# Patient Record
Sex: MALE | Race: WHITE | NOT HISPANIC OR LATINO | Employment: UNEMPLOYED | URBAN - METROPOLITAN AREA
[De-identification: names, ages, dates, MRNs, and addresses within clinical notes are randomized per-mention and may not be internally consistent; named-entity substitution may affect disease eponyms.]

---

## 2018-11-01 ENCOUNTER — HOSPITAL ENCOUNTER (EMERGENCY)
Facility: HOSPITAL | Age: 38
Discharge: HOME | End: 2018-11-01
Attending: EMERGENCY MEDICINE | Admitting: EMERGENCY MEDICINE
Payer: COMMERCIAL

## 2018-11-01 VITALS
BODY MASS INDEX: 20.3 KG/M2 | OXYGEN SATURATION: 98 % | RESPIRATION RATE: 18 BRPM | HEIGHT: 71 IN | DIASTOLIC BLOOD PRESSURE: 83 MMHG | SYSTOLIC BLOOD PRESSURE: 128 MMHG | HEART RATE: 75 BPM | TEMPERATURE: 97.9 F | WEIGHT: 145 LBS

## 2018-11-01 DIAGNOSIS — R73.9 HYPERGLYCEMIA: Primary | ICD-10-CM

## 2018-11-01 LAB
ACETONE SERPL-MCNC: NEGATIVE MG/DL
ALBUMIN SERPL BCP-MCNC: 3.6 G/DL (ref 3.5–5)
ALP SERPL-CCNC: 138 U/L (ref 46–116)
ALT SERPL W P-5'-P-CCNC: 33 U/L (ref 12–78)
ANION GAP SERPL CALCULATED.3IONS-SCNC: 16 MMOL/L (ref 4–13)
ANION GAP SERPL CALCULATED.3IONS-SCNC: 19 MMOL/L (ref 4–13)
AST SERPL W P-5'-P-CCNC: 21 U/L (ref 5–45)
BASOPHILS # BLD AUTO: 0.06 THOUSANDS/ΜL (ref 0–0.1)
BASOPHILS NFR BLD AUTO: 1 % (ref 0–1)
BILIRUB SERPL-MCNC: 0.8 MG/DL (ref 0.2–1)
BUN SERPL-MCNC: 16 MG/DL (ref 5–25)
BUN SERPL-MCNC: 17 MG/DL (ref 5–25)
CALCIUM SERPL-MCNC: 7.9 MG/DL (ref 8.3–10.1)
CALCIUM SERPL-MCNC: 8.8 MG/DL (ref 8.3–10.1)
CHLORIDE SERPL-SCNC: 101 MMOL/L (ref 100–108)
CHLORIDE SERPL-SCNC: 96 MMOL/L (ref 100–108)
CLARITY, POC: CLEAR
CO2 SERPL-SCNC: 19 MMOL/L (ref 21–32)
CO2 SERPL-SCNC: 19 MMOL/L (ref 21–32)
COLOR, POC: YELLOW
CREAT SERPL-MCNC: 0.95 MG/DL (ref 0.6–1.3)
CREAT SERPL-MCNC: 1.23 MG/DL (ref 0.6–1.3)
EOSINOPHIL # BLD AUTO: 0.49 THOUSAND/ΜL (ref 0–0.61)
EOSINOPHIL NFR BLD AUTO: 5 % (ref 0–6)
ERYTHROCYTE [DISTWIDTH] IN BLOOD BY AUTOMATED COUNT: 12.5 % (ref 11.6–15.1)
EXT BILIRUBIN, UA: NORMAL
EXT BLOOD URINE: NEGATIVE
EXT GLUCOSE, UA: NORMAL
EXT KETONES: 160
EXT NITRITE, UA: NEGATIVE
EXT PH, UA: 6
EXT PROTEIN, UA: NORMAL
EXT SPECIFIC GRAVITY, UA: 1.03
EXT UROBILINOGEN: 0.2
GFR SERPL CREATININE-BSD FRML MDRD: 100 ML/MIN/1.73SQ M
GFR SERPL CREATININE-BSD FRML MDRD: 73 ML/MIN/1.73SQ M
GLUCOSE SERPL-MCNC: 107 MG/DL (ref 65–140)
GLUCOSE SERPL-MCNC: 331 MG/DL (ref 65–140)
GLUCOSE SERPL-MCNC: 334 MG/DL (ref 65–140)
GLUCOSE SERPL-MCNC: 88 MG/DL (ref 65–140)
GLUCOSE SERPL-MCNC: 98 MG/DL (ref 65–140)
HCT VFR BLD AUTO: 51.2 % (ref 36.5–49.3)
HGB BLD-MCNC: 17.1 G/DL (ref 12–17)
IMM GRANULOCYTES # BLD AUTO: 0.03 THOUSAND/UL (ref 0–0.2)
IMM GRANULOCYTES NFR BLD AUTO: 0 % (ref 0–2)
LYMPHOCYTES # BLD AUTO: 3.24 THOUSANDS/ΜL (ref 0.6–4.47)
LYMPHOCYTES NFR BLD AUTO: 33 % (ref 14–44)
MCH RBC QN AUTO: 29.5 PG (ref 26.8–34.3)
MCHC RBC AUTO-ENTMCNC: 33.4 G/DL (ref 31.4–37.4)
MCV RBC AUTO: 88 FL (ref 82–98)
MONOCYTES # BLD AUTO: 0.68 THOUSAND/ΜL (ref 0.17–1.22)
MONOCYTES NFR BLD AUTO: 7 % (ref 4–12)
NEUTROPHILS # BLD AUTO: 5.41 THOUSANDS/ΜL (ref 1.85–7.62)
NEUTS SEG NFR BLD AUTO: 54 % (ref 43–75)
NRBC BLD AUTO-RTO: 0 /100 WBCS
PLATELET # BLD AUTO: 333 THOUSANDS/UL (ref 149–390)
PMV BLD AUTO: 12.3 FL (ref 8.9–12.7)
POTASSIUM SERPL-SCNC: 3.5 MMOL/L (ref 3.5–5.3)
POTASSIUM SERPL-SCNC: 4 MMOL/L (ref 3.5–5.3)
PROT SERPL-MCNC: 7.6 G/DL (ref 6.4–8.2)
RBC # BLD AUTO: 5.8 MILLION/UL (ref 3.88–5.62)
SODIUM SERPL-SCNC: 134 MMOL/L (ref 136–145)
SODIUM SERPL-SCNC: 136 MMOL/L (ref 136–145)
WBC # BLD AUTO: 9.91 THOUSAND/UL (ref 4.31–10.16)
WBC # BLD EST: NEGATIVE 10*3/UL

## 2018-11-01 PROCEDURE — 85025 COMPLETE CBC W/AUTO DIFF WBC: CPT | Performed by: PHYSICIAN ASSISTANT

## 2018-11-01 PROCEDURE — 96361 HYDRATE IV INFUSION ADD-ON: CPT

## 2018-11-01 PROCEDURE — 82009 KETONE BODYS QUAL: CPT | Performed by: PHYSICIAN ASSISTANT

## 2018-11-01 PROCEDURE — 36415 COLL VENOUS BLD VENIPUNCTURE: CPT | Performed by: PHYSICIAN ASSISTANT

## 2018-11-01 PROCEDURE — 80053 COMPREHEN METABOLIC PANEL: CPT | Performed by: PHYSICIAN ASSISTANT

## 2018-11-01 PROCEDURE — 81002 URINALYSIS NONAUTO W/O SCOPE: CPT | Performed by: PHYSICIAN ASSISTANT

## 2018-11-01 PROCEDURE — 82948 REAGENT STRIP/BLOOD GLUCOSE: CPT

## 2018-11-01 PROCEDURE — 96374 THER/PROPH/DIAG INJ IV PUSH: CPT

## 2018-11-01 PROCEDURE — 99284 EMERGENCY DEPT VISIT MOD MDM: CPT

## 2018-11-01 PROCEDURE — 80048 BASIC METABOLIC PNL TOTAL CA: CPT | Performed by: PHYSICIAN ASSISTANT

## 2018-11-01 RX ORDER — LEVOTHYROXINE SODIUM 0.07 MG/1
75 TABLET ORAL DAILY
COMMUNITY

## 2018-11-01 RX ADMIN — INSULIN HUMAN 7 UNITS: 100 INJECTION, SOLUTION PARENTERAL at 11:48

## 2018-11-01 RX ADMIN — SODIUM CHLORIDE 1000 ML: 0.9 INJECTION, SOLUTION INTRAVENOUS at 11:40

## 2018-11-01 RX ADMIN — SODIUM CHLORIDE 1000 ML: 0.9 INJECTION, SOLUTION INTRAVENOUS at 13:04

## 2018-11-01 NOTE — ED PROVIDER NOTES
History  Chief Complaint   Patient presents with    Hyperglycemia - Symptomatic     Type 2 DM  He has not had any insulin for 2wks  He ran out of it and has not been to Dr for refill  Decreased appetite, nausea, dry mouth  Pt is a 43year old Diabetic male with a history of IV meth abuse presenting today because he ran out of his insulin 2 weeks ago  He states that he had a recent admission to [facility] and was discharged on October 12th  He states he hasn't seen his PCP for a couple years  He was receiving insulin while inpatient, but was not discharged on anything  He states he relapsed and started using meth again when discharged  He states last use was 1 week ago  He does not monitor his blood sugar at home, but states he has had nausea, fatigue, anorexia  History provided by:  Patient   used: No    Hyperglycemia - Symptomatic   Severity:  Moderate  Onset quality:  Gradual  Duration:  2 weeks  Timing:  Constant  Progression:  Worsening  Chronicity:  Recurrent  Diabetes status:  Controlled with insulin and controlled with oral medications  Current diabetic therapy:  Metformin, NPH, lispro  Time since last antidiabetic medication:  2 weeks  Context: noncompliance    Relieved by:  Nothing  Ineffective treatments:  None tried  Associated symptoms: abdominal pain, blurred vision, dehydration, dizziness, nausea and vomiting    Associated symptoms: no chest pain, no dysuria, no fatigue, no fever and no shortness of breath    Abdominal pain:     Location:  Generalized    Severity:  Mild    Onset quality:  Gradual    Duration:  2 weeks    Timing:  Intermittent    Progression:  Waxing and waning    Chronicity:  Recurrent      Prior to Admission Medications   Prescriptions Last Dose Informant Patient Reported?  Taking?   insulin NPH (HumuLIN N,NovoLIN N) 100 Units/mL subcutaneous injection Past Month at Unknown time  Yes Yes   Sig: Inject 24 Units under the skin 24 UNITS am  18 UNITS hs   insulin lispro (HumaLOG) 100 units/mL injection Past Month at Unknown time  Yes Yes   Sig: Inject 4 Units under the skin 3 (three) times a day before meals   levothyroxine 75 mcg tablet   Yes Yes   Sig: Take 75 mcg by mouth daily   metFORMIN (GLUCOPHAGE) 1000 MG tablet   Yes Yes   Sig: Take 1,000 mg by mouth 2 (two) times a day with meals      Facility-Administered Medications: None       Past Medical History:   Diagnosis Date    Diabetes mellitus ([facility] [address] )     Disease of thyroid gland        History reviewed  No pertinent surgical history  History reviewed  No pertinent family history  I have reviewed and agree with the history as documented  Social History   Substance Use Topics    Smoking status: Current Every Day Smoker     Packs/day: 0 50     Types: Cigarettes    Smokeless tobacco: Never Used    Alcohol use Yes      Comment: ocassionally        Review of Systems   Constitutional: Negative for chills, fatigue and fever  HENT: Negative for congestion, rhinorrhea, sneezing and sore throat  Eyes: Positive for blurred vision  Respiratory: Negative for cough, chest tightness and shortness of breath  Cardiovascular: Negative for chest pain  Gastrointestinal: Positive for abdominal pain, nausea and vomiting  Negative for constipation and diarrhea  Genitourinary: Negative for difficulty urinating and dysuria  Musculoskeletal: Negative for arthralgias and myalgias  Skin: Negative for color change, pallor and rash  Neurological: Positive for dizziness and light-headedness  Negative for headaches  All other systems reviewed and are negative  Physical Exam  Physical Exam   Constitutional: He is oriented to person, place, and time  He appears well-developed and well-nourished  No distress  HENT:   Head: Normocephalic and atraumatic     Right Ear: Tympanic membrane and ear canal normal    Left Ear: Tympanic membrane and ear canal normal    Nose: Nose normal    Mouth/Throat: Uvula is midline  Mucous membranes are dry  Eyes: Conjunctivae and lids are normal    Neck: Neck supple  Cardiovascular: Regular rhythm and intact distal pulses  Tachycardia present  No murmur heard  Pulmonary/Chest: Effort normal and breath sounds normal  No respiratory distress  Abdominal: Soft  Bowel sounds are normal  He exhibits no distension  There is no tenderness  Lymphadenopathy:     He has no cervical adenopathy  Neurological: He is alert and oriented to person, place, and time  Skin: Skin is warm and dry  No rash noted  He is not diaphoretic  No pallor  Patient has a small indurated area to right volar forearm, tender to palpation, measuring 2cm  No fluctuant area  Nursing note and vitals reviewed        Vital Signs  ED Triage Vitals [11/01/18 1126]   Temperature Pulse Respirations Blood Pressure SpO2   97 9 °F (36 6 °C) (!) 109 18 (!) 164/101 99 %      Temp Source Heart Rate Source Patient Position - Orthostatic VS BP Location FiO2 (%)   Temporal -- Lying Left arm --      Pain Score       --           Vitals:    11/01/18 1230 11/01/18 1330 11/01/18 1400 11/01/18 1500   BP: 141/93 151/91 143/88 128/83   Pulse: 89 75 72 75   Patient Position - Orthostatic VS:           Visual Acuity      ED Medications  Medications   sodium chloride 0 9 % bolus 1,000 mL (0 mL Intravenous Stopped 11/1/18 1304)   insulin regular (HumuLIN R,NovoLIN R) injection 7 Units (7 Units Intravenous Given 11/1/18 1148)   sodium chloride 0 9 % bolus 1,000 mL (0 mL Intravenous Stopped 11/1/18 1512)       Diagnostic Studies  Results Reviewed     Procedure Component Value Units Date/Time    Fingerstick Glucose (POCT) [[id number]]  (Normal) Collected:  11/01/18 1618    Lab Status:  Final result Updated:  11/01/18 1620     POC Glucose 107 mg/dl     POCT urinalysis dipstick [[id number]]  (Normal) Resulted:  11/01/18 1518    Lab Status:  Final result Specimen:  Urine Updated:  11/01/18 1519     Color, UA yellow     Clarity, UA clear Glucose, UA (Ref: Negative) 2 or more     Bilirubin, UA (Ref: Negative) ++     Ketones, UA (Ref: Negative) 160     Spec Grav, UA (Ref:1 003-1 030) 1 030     Blood, UA (Ref: Negative) negative     pH, UA (Ref: 4 5-8 0) 6 0     Protein, UA (Ref: Negative) trace     Urobilinogen, UA (Ref: 0 2- 1 0) 0 2      Leukocytes, UA (Ref: Negative) negative     Nitrite, UA (Ref: Negative) negative    Basic metabolic panel [[id number]]  (Abnormal) Collected:  11/01/18 1509    Lab Status:  Final result Specimen:  Blood from Arm, Right Updated:  11/01/18 1518     Sodium 136 mmol/L      Potassium 3 5 mmol/L      Chloride 101 mmol/L      CO2 19 (L) mmol/L      ANION GAP 16 (H) mmol/L      BUN 16 mg/dL      Creatinine 0 95 mg/dL      Glucose 98 mg/dL      Calcium 7 9 (L) mg/dL      eGFR 100 ml/min/1 73sq m     Narrative:         National Kidney Disease Education Program recommendations are as follows:  GFR calculation is accurate only with a steady state creatinine  Chronic Kidney disease less than 60 ml/min/1 73 sq  meters  Kidney failure less than 15 ml/min/1 73 sq  meters      Fingerstick Glucose (POCT) [[id number]]  (Normal) Collected:  11/01/18 1502    Lab Status:  Final result Updated:  11/01/18 1503     POC Glucose 88 mg/dl     Comprehensive metabolic panel [[id number]]  (Abnormal) Collected:  11/01/18 1151    Lab Status:  Final result Specimen:  Blood from Arm, Right Updated:  11/01/18 1226     Sodium 134 (L) mmol/L      Potassium 4 0 mmol/L      Chloride 96 (L) mmol/L      CO2 19 (L) mmol/L      ANION GAP 19 (H) mmol/L      BUN 17 mg/dL      Creatinine 1 23 mg/dL      Glucose 331 (H) mg/dL      Calcium 8 8 mg/dL      AST 21 U/L      ALT 33 U/L      Alkaline Phosphatase 138 (H) U/L      Total Protein 7 6 g/dL      Albumin 3 6 g/dL      Total Bilirubin 0 80 mg/dL      eGFR 73 ml/min/1 73sq m     Narrative:         National Kidney Disease Education Program recommendations are as follows:  GFR calculation is accurate only with a steady state creatinine  Chronic Kidney disease less than 60 ml/min/1 73 sq  meters  Kidney failure less than 15 ml/min/1 73 sq  meters  Acetone [[id number]]  (Normal) Collected:  11/01/18 1151    Lab Status:  Final result Specimen:  Blood from Arm, Right Updated:  11/01/18 1220     Acetone, Bld Negative    CBC and differential [[id number]]  (Abnormal) Collected:  11/01/18 1151    Lab Status:  Final result Specimen:  Blood from Arm, Right Updated:  11/01/18 1211     WBC 9 91 Thousand/uL      RBC 5 80 (H) Million/uL      Hemoglobin 17 1 (H) g/dL      Hematocrit 51 2 (H) %      MCV 88 fL      MCH 29 5 pg      MCHC 33 4 g/dL      RDW 12 5 %      MPV 12 3 fL      Platelets 556 Thousands/uL      nRBC 0 /100 WBCs      Neutrophils Relative 54 %      Immat GRANS % 0 %      Lymphocytes Relative 33 %      Monocytes Relative 7 %      Eosinophils Relative 5 %      Basophils Relative 1 %      Neutrophils Absolute 5 41 Thousands/µL      Immature Grans Absolute 0 03 Thousand/uL      Lymphocytes Absolute 3 24 Thousands/µL      Monocytes Absolute 0 68 Thousand/µL      Eosinophils Absolute 0 49 Thousand/µL      Basophils Absolute 0 06 Thousands/µL     Fingerstick Glucose (POCT) [[id number]]  (Abnormal) Collected:  11/01/18 1126    Lab Status:  Final result Updated:  11/01/18 1127     POC Glucose 334 (H) mg/dl                  No orders to display              Procedures  Procedures       Phone Contacts  ED Phone Contact    ED Course                               MDM  Number of Diagnoses or Management Options  Hyperglycemia: established and worsening  Diagnosis management comments: Patient with hyperglycemia due to not taking meds for 2 weeks  He does have anion gap, but I feel like this is due to drug use since acetone is negative  Patient does not appear ill and is tolerating po  Patient's BS dropped to 88 after only 7 units of insulin, will give patient food and advised him to monitor BS closely  He is requesting refill of insulin    I advised him to f/u with PCP for refill, will refill Metformin  Amount and/or Complexity of Data Reviewed  Clinical lab tests: ordered and reviewed    Patient Progress  Patient progress: improved    CritCare Time    Disposition  Final diagnoses:   Hyperglycemia     Time reflects when diagnosis was documented in both MDM as applicable and the Disposition within this note     Time User Action Codes Description Comment    11/1/2018  4:27 PM Rigo Romano Add [R73 9] Hyperglycemia       ED Disposition     ED Disposition Condition Comment    Discharge  Unknown Quivers discharge to home/self care  Condition at discharge: Stable        Follow-up Information     Follow up With Specialties Details Why [address]  [address], [place] [facility] In 3 days For recheck [address] [place] [address]  [phone]            Patient's Medications   Discharge Prescriptions    METFORMIN (GLUCOPHAGE) 500 MG TABLET    Take 1 tablet (500 mg total) by mouth 2 (two) times a day with meals       Start Date: 11/1/2018 End Date: --       Order Dose: 500 mg       Quantity: 14 tablet    Refills: 0     No discharge procedures on file      ED Provider  Electronically Signed by           Ernesto Oconnor PA-C  11/01/18 6332

## 2018-11-01 NOTE — DISCHARGE INSTRUCTIONS
Rest, increase fluids  Take metformin twice a day  Follow up with family doctor in 3-5 days for recheck  Monitor blood sugar closely  Warm compresses to right arm  Diabetic Hyperglycemia   WHAT YOU NEED TO KNOW:   Diabetic hyperglycemia is a blood glucose (sugar) level that is higher than your healthcare provider recommends  You may have increased thirst and urinate more often than usual  Over time, uncontrolled diabetes can damage your nerves, blood vessels, tissues, and organs  That is why it is important to manage diabetic hyperglycemia  Without treatment, diabetic hyperglycemia can lead to diabetic ketoacidosis (DKA) or hyperglycemic hyperosmolar state (HHS)  These are serious conditions that can become life-threatening  DISCHARGE INSTRUCTIONS:   Return to the emergency department if:   · You have shortness of breath  · Your breath smells fruity  · You have nausea and vomiting  · You have symptoms of dehydration, such as dark yellow urine, dry mouth and lips, and dry skin  Contact your healthcare provider if:   · You continue to have higher blood sugar levels than your healthcare provider recommends  · Your blood sugar level is over 240 mg/dl and  you have ketones in your urine  · You have questions or concerns about your condition or care  Medicines:   · Medicines  such as insulin and hypoglycemic medicine decrease blood sugar levels  · Take your medicine as directed  Contact your healthcare provider if you think your medicine is not helping or if you have side effects  Tell him or her if you are allergic to any medicine  Keep a list of the medicines, vitamins, and herbs you take  Include the amounts, and when and why you take them  Bring the list or the pill bottles to follow-up visits  Carry your medicine list with you in case of an emergency  Follow up with your healthcare provider or specialist as directed:   Your healthcare provider may refer you to a dietitian or diabetes specialist  Write down your questions so you remember to ask them during your visits  Manage diabetic hyperglycemia:   · If you take diabetes medicine or insulin, take it as directed  Missed or wrong doses can cause your blood sugar to go up  · Tell your healthcare provider if you continue to have trouble managing your blood sugar  He may change the type, amount, or timing of your diabetes medicine or insulin  If you do not take diabetes medicine or insulin, you may need to start  · Work with your healthcare provider to develop a sick day plan  Illness can cause your blood sugar to rise  A sick day plan helps you control your blood sugar level when you are sick  Prevent diabetic hyperglycemia:   · Check your blood sugar levels regularly  Ask your healthcare provider how often to check your blood sugar and what your levels should be  · Follow your meal plan  Your blood sugar can go up if you eat a large meal or you eat more carbohydrates than recommended  Work with a dietitian to develop a meal plan that is right for you  · Exercise regularly  to help lower your blood sugar when it is high  It can also keep your blood sugar levels steady over time  Exercise for at least 30 minutes, 5 days a week  Include muscle strengthening activities 2 days each week  Do not sit for longer than 90 minutes at a time  Work with your healthcare provider to create an exercise plan  Children should get at least 60 minutes of physical activity each day  · Check your ketones before exercise  if your blood sugar level is above 240 mg/dl  Do not exercise if you have ketones in your urine,  because your blood sugar level may rise even more  Ask your healthcare provider how to lower your blood sugar when you have ketones  © 2017 [address] [employer] Information is for End User's use only and may not be sold, redistributed or otherwise used for commercial purposes   All illustrations and images included in [facility] are the copyrighted property of [employer]  or [employer]  The above information is an  only  It is not intended as medical advice for individual conditions or treatments  Talk to your doctor, nurse or pharmacist before following any medical regimen to see if it is safe and effective for you

## 2020-04-19 ENCOUNTER — APPOINTMENT (EMERGENCY)
Dept: RADIOLOGY | Facility: HOSPITAL | Age: 40
DRG: 420 | End: 2020-04-19
Payer: COMMERCIAL

## 2020-04-19 ENCOUNTER — APPOINTMENT (EMERGENCY)
Dept: CT IMAGING | Facility: HOSPITAL | Age: 40
DRG: 420 | End: 2020-04-19
Payer: COMMERCIAL

## 2020-04-19 ENCOUNTER — HOSPITAL ENCOUNTER (INPATIENT)
Facility: HOSPITAL | Age: 40
LOS: 1 days | Discharge: LEFT BEFORE DISCHARGE | DRG: 420 | End: 2020-04-20
Attending: EMERGENCY MEDICINE | Admitting: HOSPITALIST
Payer: COMMERCIAL

## 2020-04-19 DIAGNOSIS — S69.92XA HAND INJURY, LEFT, INITIAL ENCOUNTER: ICD-10-CM

## 2020-04-19 DIAGNOSIS — S61.412A LACERATION OF LEFT HAND WITHOUT FOREIGN BODY, INITIAL ENCOUNTER: Primary | ICD-10-CM

## 2020-04-19 DIAGNOSIS — N17.9 AKI (ACUTE KIDNEY INJURY) (HCC): ICD-10-CM

## 2020-04-19 DIAGNOSIS — S69.91XA HAND INJURY, RIGHT, INITIAL ENCOUNTER: ICD-10-CM

## 2020-04-19 DIAGNOSIS — E11.01 HHNC (HYPERGLYCEMIC HYPEROSMOLAR NONKETOTIC COMA) (HCC): ICD-10-CM

## 2020-04-19 DIAGNOSIS — R74.01 TRANSAMINITIS: ICD-10-CM

## 2020-04-19 DIAGNOSIS — K59.00 CONSTIPATION: ICD-10-CM

## 2020-04-19 PROBLEM — E87.1 HYPONATREMIA: Status: ACTIVE | Noted: 2020-04-19

## 2020-04-19 PROBLEM — F17.200 NICOTINE DEPENDENCE: Status: ACTIVE | Noted: 2020-04-19

## 2020-04-19 PROBLEM — E11.9 DIABETES (HCC): Status: ACTIVE | Noted: 2020-04-19

## 2020-04-19 PROBLEM — S61.419A HAND LACERATION: Status: ACTIVE | Noted: 2020-04-19

## 2020-04-19 PROBLEM — E03.9 HYPOTHYROIDISM: Status: ACTIVE | Noted: 2020-04-19

## 2020-04-19 PROBLEM — F11.90 HEROIN USE: Status: ACTIVE | Noted: 2020-04-19

## 2020-04-19 LAB
ALBUMIN SERPL BCP-MCNC: 3.2 G/DL (ref 3.5–5)
ALP SERPL-CCNC: 272 U/L (ref 46–116)
ALT SERPL W P-5'-P-CCNC: 1083 U/L (ref 12–78)
AMPHETAMINES SERPL QL SCN: NEGATIVE
ANION GAP SERPL CALCULATED.3IONS-SCNC: 7 MMOL/L (ref 4–13)
ANION GAP SERPL CALCULATED.3IONS-SCNC: 9 MMOL/L (ref 4–13)
AST SERPL W P-5'-P-CCNC: 639 U/L (ref 5–45)
BACTERIA UR QL AUTO: ABNORMAL /HPF
BARBITURATES UR QL: NEGATIVE
BASE EX.OXY STD BLDV CALC-SCNC: 79.5 % (ref 60–80)
BASE EXCESS BLDV CALC-SCNC: 0.3 MMOL/L
BASOPHILS # BLD AUTO: 0.05 THOUSANDS/ΜL (ref 0–0.1)
BASOPHILS NFR BLD AUTO: 1 % (ref 0–1)
BENZODIAZ UR QL: NEGATIVE
BETA-HYDROXYBUTYRATE: 0.6 MMOL/L
BILIRUB SERPL-MCNC: 0.44 MG/DL (ref 0.2–1)
BILIRUB UR QL STRIP: NEGATIVE
BUN SERPL-MCNC: 14 MG/DL (ref 5–25)
BUN SERPL-MCNC: 23 MG/DL (ref 5–25)
CALCIUM SERPL-MCNC: 7.7 MG/DL (ref 8.3–10.1)
CALCIUM SERPL-MCNC: 8.9 MG/DL (ref 8.3–10.1)
CHLORIDE SERPL-SCNC: 92 MMOL/L (ref 100–108)
CHLORIDE SERPL-SCNC: 97 MMOL/L (ref 100–108)
CK MB SERPL-MCNC: 1.1 % (ref 0–2.5)
CK MB SERPL-MCNC: 1.8 NG/ML (ref 0–5)
CK SERPL-CCNC: 168 U/L (ref 39–308)
CLARITY UR: CLEAR
CO2 SERPL-SCNC: 26 MMOL/L (ref 21–32)
CO2 SERPL-SCNC: 27 MMOL/L (ref 21–32)
COCAINE UR QL: NEGATIVE
COLOR UR: YELLOW
CREAT SERPL-MCNC: 1.17 MG/DL (ref 0.6–1.3)
CREAT SERPL-MCNC: 1.32 MG/DL (ref 0.6–1.3)
EOSINOPHIL # BLD AUTO: 0.11 THOUSAND/ΜL (ref 0–0.61)
EOSINOPHIL NFR BLD AUTO: 1 % (ref 0–6)
ERYTHROCYTE [DISTWIDTH] IN BLOOD BY AUTOMATED COUNT: 12.1 % (ref 11.6–15.1)
GFR SERPL CREATININE-BSD FRML MDRD: 67 ML/MIN/1.73SQ M
GFR SERPL CREATININE-BSD FRML MDRD: 78 ML/MIN/1.73SQ M
GLUCOSE SERPL-MCNC: 140 MG/DL (ref 65–140)
GLUCOSE SERPL-MCNC: 180 MG/DL (ref 65–140)
GLUCOSE SERPL-MCNC: 185 MG/DL (ref 65–140)
GLUCOSE SERPL-MCNC: 232 MG/DL (ref 65–140)
GLUCOSE SERPL-MCNC: 384 MG/DL (ref 65–140)
GLUCOSE SERPL-MCNC: 446 MG/DL (ref 65–140)
GLUCOSE SERPL-MCNC: 449 MG/DL (ref 65–140)
GLUCOSE SERPL-MCNC: 572 MG/DL (ref 65–140)
GLUCOSE SERPL-MCNC: 791 MG/DL (ref 65–140)
GLUCOSE SERPL-MCNC: 92 MG/DL (ref 65–140)
GLUCOSE SERPL-MCNC: >500 MG/DL (ref 65–140)
GLUCOSE UR STRIP-MCNC: ABNORMAL MG/DL
HAV IGM SER QL: NORMAL
HBV CORE IGM SER QL: NORMAL
HBV SURFACE AG SER QL: NORMAL
HCO3 BLDV-SCNC: 24.5 MMOL/L (ref 24–30)
HCT VFR BLD AUTO: 41.6 % (ref 36.5–49.3)
HCV AB SER QL: NORMAL
HGB BLD-MCNC: 13.5 G/DL (ref 12–17)
HGB UR QL STRIP.AUTO: ABNORMAL
IMM GRANULOCYTES # BLD AUTO: 0.05 THOUSAND/UL (ref 0–0.2)
IMM GRANULOCYTES NFR BLD AUTO: 1 % (ref 0–2)
KETONES UR STRIP-MCNC: NEGATIVE MG/DL
LEUKOCYTE ESTERASE UR QL STRIP: NEGATIVE
LYMPHOCYTES # BLD AUTO: 1.88 THOUSANDS/ΜL (ref 0.6–4.47)
LYMPHOCYTES NFR BLD AUTO: 19 % (ref 14–44)
MCH RBC QN AUTO: 30.3 PG (ref 26.8–34.3)
MCHC RBC AUTO-ENTMCNC: 32.5 G/DL (ref 31.4–37.4)
MCV RBC AUTO: 94 FL (ref 82–98)
METHADONE UR QL: NEGATIVE
MONOCYTES # BLD AUTO: 0.67 THOUSAND/ΜL (ref 0.17–1.22)
MONOCYTES NFR BLD AUTO: 7 % (ref 4–12)
NEUTROPHILS # BLD AUTO: 6.94 THOUSANDS/ΜL (ref 1.85–7.62)
NEUTS SEG NFR BLD AUTO: 71 % (ref 43–75)
NITRITE UR QL STRIP: NEGATIVE
NON-SQ EPI CELLS URNS QL MICRO: ABNORMAL /HPF
NRBC BLD AUTO-RTO: 0 /100 WBCS
O2 CT BLDV-SCNC: 16 ML/DL
OPIATES UR QL SCN: NEGATIVE
PCO2 BLDV: 38.3 MM HG (ref 42–50)
PCP UR QL: NEGATIVE
PH BLDV: 7.42 [PH] (ref 7.3–7.4)
PH UR STRIP.AUTO: 6 [PH] (ref 4.5–8)
PLATELET # BLD AUTO: 321 THOUSANDS/UL (ref 149–390)
PMV BLD AUTO: 11 FL (ref 8.9–12.7)
PO2 BLDV: 48.9 MM HG (ref 35–45)
POTASSIUM SERPL-SCNC: 3.6 MMOL/L (ref 3.5–5.3)
POTASSIUM SERPL-SCNC: 4.2 MMOL/L (ref 3.5–5.3)
PROT SERPL-MCNC: 7.2 G/DL (ref 6.4–8.2)
PROT UR STRIP-MCNC: NEGATIVE MG/DL
RBC # BLD AUTO: 4.45 MILLION/UL (ref 3.88–5.62)
RBC #/AREA URNS AUTO: ABNORMAL /HPF
SODIUM SERPL-SCNC: 127 MMOL/L (ref 136–145)
SODIUM SERPL-SCNC: 131 MMOL/L (ref 136–145)
SP GR UR STRIP.AUTO: 1.01 (ref 1–1.03)
THC UR QL: NEGATIVE
TSH SERPL DL<=0.05 MIU/L-ACNC: 17.91 UIU/ML (ref 0.36–3.74)
UROBILINOGEN UR QL STRIP.AUTO: 0.2 E.U./DL
WBC # BLD AUTO: 9.7 THOUSAND/UL (ref 4.31–10.16)
WBC #/AREA URNS AUTO: ABNORMAL /HPF

## 2020-04-19 PROCEDURE — 80053 COMPREHEN METABOLIC PANEL: CPT | Performed by: NURSE PRACTITIONER

## 2020-04-19 PROCEDURE — 82948 REAGENT STRIP/BLOOD GLUCOSE: CPT

## 2020-04-19 PROCEDURE — 80074 ACUTE HEPATITIS PANEL: CPT | Performed by: NURSE PRACTITIONER

## 2020-04-19 PROCEDURE — 99285 EMERGENCY DEPT VISIT HI MDM: CPT

## 2020-04-19 PROCEDURE — 80048 BASIC METABOLIC PNL TOTAL CA: CPT | Performed by: INTERNAL MEDICINE

## 2020-04-19 PROCEDURE — 83540 ASSAY OF IRON: CPT | Performed by: INTERNAL MEDICINE

## 2020-04-19 PROCEDURE — 84443 ASSAY THYROID STIM HORMONE: CPT | Performed by: INTERNAL MEDICINE

## 2020-04-19 PROCEDURE — 96376 TX/PRO/DX INJ SAME DRUG ADON: CPT

## 2020-04-19 PROCEDURE — 96361 HYDRATE IV INFUSION ADD-ON: CPT

## 2020-04-19 PROCEDURE — 83550 IRON BINDING TEST: CPT | Performed by: INTERNAL MEDICINE

## 2020-04-19 PROCEDURE — 82010 KETONE BODYS QUAN: CPT | Performed by: NURSE PRACTITIONER

## 2020-04-19 PROCEDURE — 74177 CT ABD & PELVIS W/CONTRAST: CPT

## 2020-04-19 PROCEDURE — 0HQGXZZ REPAIR LEFT HAND SKIN, EXTERNAL APPROACH: ICD-10-PCS | Performed by: EMERGENCY MEDICINE

## 2020-04-19 PROCEDURE — 96372 THER/PROPH/DIAG INJ SC/IM: CPT

## 2020-04-19 PROCEDURE — 82550 ASSAY OF CK (CPK): CPT | Performed by: INTERNAL MEDICINE

## 2020-04-19 PROCEDURE — 99223 1ST HOSP IP/OBS HIGH 75: CPT | Performed by: INTERNAL MEDICINE

## 2020-04-19 PROCEDURE — 81001 URINALYSIS AUTO W/SCOPE: CPT

## 2020-04-19 PROCEDURE — 94762 N-INVAS EAR/PLS OXIMTRY CONT: CPT

## 2020-04-19 PROCEDURE — 96374 THER/PROPH/DIAG INJ IV PUSH: CPT

## 2020-04-19 PROCEDURE — 82553 CREATINE MB FRACTION: CPT | Performed by: INTERNAL MEDICINE

## 2020-04-19 PROCEDURE — 36415 COLL VENOUS BLD VENIPUNCTURE: CPT | Performed by: NURSE PRACTITIONER

## 2020-04-19 PROCEDURE — 80307 DRUG TEST PRSMV CHEM ANLYZR: CPT | Performed by: NURSE PRACTITIONER

## 2020-04-19 PROCEDURE — 82805 BLOOD GASES W/O2 SATURATION: CPT | Performed by: NURSE PRACTITIONER

## 2020-04-19 PROCEDURE — 12001 RPR S/N/AX/GEN/TRNK 2.5CM/<: CPT | Performed by: NURSE PRACTITIONER

## 2020-04-19 PROCEDURE — 99285 EMERGENCY DEPT VISIT HI MDM: CPT | Performed by: NURSE PRACTITIONER

## 2020-04-19 PROCEDURE — 96375 TX/PRO/DX INJ NEW DRUG ADDON: CPT

## 2020-04-19 PROCEDURE — 85025 COMPLETE CBC W/AUTO DIFF WBC: CPT | Performed by: NURSE PRACTITIONER

## 2020-04-19 PROCEDURE — 82728 ASSAY OF FERRITIN: CPT | Performed by: INTERNAL MEDICINE

## 2020-04-19 PROCEDURE — 73130 X-RAY EXAM OF HAND: CPT

## 2020-04-19 RX ORDER — KETOROLAC TROMETHAMINE 30 MG/ML
15 INJECTION, SOLUTION INTRAMUSCULAR; INTRAVENOUS ONCE
Status: COMPLETED | OUTPATIENT
Start: 2020-04-19 | End: 2020-04-19

## 2020-04-19 RX ORDER — LEVOTHYROXINE SODIUM 0.07 MG/1
75 TABLET ORAL DAILY
Status: DISCONTINUED | OUTPATIENT
Start: 2020-04-19 | End: 2020-04-20 | Stop reason: HOSPADM

## 2020-04-19 RX ORDER — MAGNESIUM HYDROXIDE/ALUMINUM HYDROXICE/SIMETHICONE 120; 1200; 1200 MG/30ML; MG/30ML; MG/30ML
30 SUSPENSION ORAL EVERY 6 HOURS PRN
Status: DISCONTINUED | OUTPATIENT
Start: 2020-04-19 | End: 2020-04-20 | Stop reason: HOSPADM

## 2020-04-19 RX ORDER — CALCIUM CARBONATE 200(500)MG
1000 TABLET,CHEWABLE ORAL DAILY PRN
Status: DISCONTINUED | OUTPATIENT
Start: 2020-04-19 | End: 2020-04-20 | Stop reason: HOSPADM

## 2020-04-19 RX ORDER — ONDANSETRON 2 MG/ML
4 INJECTION INTRAMUSCULAR; INTRAVENOUS ONCE
Status: COMPLETED | OUTPATIENT
Start: 2020-04-19 | End: 2020-04-19

## 2020-04-19 RX ORDER — CLONIDINE HYDROCHLORIDE 0.1 MG/1
0.1 TABLET ORAL ONCE
Status: COMPLETED | OUTPATIENT
Start: 2020-04-19 | End: 2020-04-19

## 2020-04-19 RX ORDER — INSULIN GLARGINE 100 [IU]/ML
18 INJECTION, SOLUTION SUBCUTANEOUS
Status: DISCONTINUED | OUTPATIENT
Start: 2020-04-19 | End: 2020-04-19

## 2020-04-19 RX ORDER — NICOTINE 21 MG/24HR
1 PATCH, TRANSDERMAL 24 HOURS TRANSDERMAL DAILY
Status: DISCONTINUED | OUTPATIENT
Start: 2020-04-19 | End: 2020-04-20 | Stop reason: HOSPADM

## 2020-04-19 RX ORDER — ONDANSETRON 2 MG/ML
4 INJECTION INTRAMUSCULAR; INTRAVENOUS EVERY 6 HOURS PRN
Status: DISCONTINUED | OUTPATIENT
Start: 2020-04-19 | End: 2020-04-20 | Stop reason: HOSPADM

## 2020-04-19 RX ORDER — CLONIDINE HYDROCHLORIDE 0.1 MG/1
0.1 TABLET ORAL EVERY 12 HOURS PRN
Status: DISCONTINUED | OUTPATIENT
Start: 2020-04-19 | End: 2020-04-20 | Stop reason: HOSPADM

## 2020-04-19 RX ORDER — HYDROXYZINE HYDROCHLORIDE 25 MG/1
25 TABLET, FILM COATED ORAL EVERY 6 HOURS PRN
Status: DISCONTINUED | OUTPATIENT
Start: 2020-04-19 | End: 2020-04-20 | Stop reason: HOSPADM

## 2020-04-19 RX ORDER — ACETAMINOPHEN 325 MG/1
650 TABLET ORAL EVERY 6 HOURS PRN
Status: DISCONTINUED | OUTPATIENT
Start: 2020-04-19 | End: 2020-04-20 | Stop reason: HOSPADM

## 2020-04-19 RX ORDER — LORAZEPAM 1 MG/1
1 TABLET ORAL EVERY 8 HOURS PRN
Status: DISCONTINUED | OUTPATIENT
Start: 2020-04-19 | End: 2020-04-20 | Stop reason: HOSPADM

## 2020-04-19 RX ORDER — LIDOCAINE HYDROCHLORIDE 10 MG/ML
5 INJECTION, SOLUTION EPIDURAL; INFILTRATION; INTRACAUDAL; PERINEURAL ONCE
Status: COMPLETED | OUTPATIENT
Start: 2020-04-19 | End: 2020-04-19

## 2020-04-19 RX ORDER — DOCUSATE SODIUM 100 MG/1
100 CAPSULE, LIQUID FILLED ORAL 2 TIMES DAILY
Status: DISCONTINUED | OUTPATIENT
Start: 2020-04-19 | End: 2020-04-20 | Stop reason: HOSPADM

## 2020-04-19 RX ADMIN — LEVOTHYROXINE SODIUM 75 MCG: 75 TABLET ORAL at 09:54

## 2020-04-19 RX ADMIN — SODIUM CHLORIDE 1000 ML: 0.9 INJECTION, SOLUTION INTRAVENOUS at 03:34

## 2020-04-19 RX ADMIN — HYDROXYZINE HYDROCHLORIDE 25 MG: 25 TABLET ORAL at 12:07

## 2020-04-19 RX ADMIN — IOHEXOL 50 ML: 350 INJECTION, SOLUTION INTRAVENOUS at 05:49

## 2020-04-19 RX ADMIN — KETOROLAC TROMETHAMINE 15 MG: 30 INJECTION, SOLUTION INTRAMUSCULAR at 03:35

## 2020-04-19 RX ADMIN — NICOTINE 1 PATCH: 14 PATCH TRANSDERMAL at 09:54

## 2020-04-19 RX ADMIN — INSULIN HUMAN 10 UNITS: 100 INJECTION, SOLUTION PARENTERAL at 04:46

## 2020-04-19 RX ADMIN — SODIUM CHLORIDE 15 UNITS/HR: 9 INJECTION, SOLUTION INTRAVENOUS at 14:09

## 2020-04-19 RX ADMIN — INSULIN LISPRO 6 UNITS: 100 INJECTION, SOLUTION INTRAVENOUS; SUBCUTANEOUS at 12:12

## 2020-04-19 RX ADMIN — LIDOCAINE HYDROCHLORIDE 5 ML: 10 INJECTION, SOLUTION EPIDURAL; INFILTRATION; INTRACAUDAL; PERINEURAL at 03:36

## 2020-04-19 RX ADMIN — ONDANSETRON 4 MG: 2 INJECTION INTRAMUSCULAR; INTRAVENOUS at 14:07

## 2020-04-19 RX ADMIN — ONDANSETRON 4 MG: 2 INJECTION INTRAMUSCULAR; INTRAVENOUS at 03:35

## 2020-04-19 RX ADMIN — LORAZEPAM 1 MG: 1 TABLET ORAL at 14:07

## 2020-04-19 RX ADMIN — INSULIN LISPRO 6 UNITS: 100 INJECTION, SOLUTION INTRAVENOUS; SUBCUTANEOUS at 09:56

## 2020-04-19 RX ADMIN — DOCUSATE SODIUM 100 MG: 100 CAPSULE, LIQUID FILLED ORAL at 09:54

## 2020-04-19 RX ADMIN — ONDANSETRON 4 MG: 2 INJECTION INTRAMUSCULAR; INTRAVENOUS at 05:58

## 2020-04-19 RX ADMIN — INSULIN LISPRO 5 UNITS: 100 INJECTION, SOLUTION INTRAVENOUS; SUBCUTANEOUS at 12:11

## 2020-04-19 RX ADMIN — CLONIDINE HYDROCHLORIDE 0.1 MG: 0.1 TABLET ORAL at 14:07

## 2020-04-19 RX ADMIN — HYDROXYZINE HYDROCHLORIDE 25 MG: 25 TABLET ORAL at 19:30

## 2020-04-19 RX ADMIN — SODIUM CHLORIDE 1000 ML: 0.9 INJECTION, SOLUTION INTRAVENOUS at 05:58

## 2020-04-19 RX ADMIN — INSULIN GLARGINE 18 UNITS: 100 INJECTION, SOLUTION SUBCUTANEOUS at 09:53

## 2020-04-20 VITALS
HEART RATE: 74 BPM | BODY MASS INDEX: 19.6 KG/M2 | TEMPERATURE: 98.1 F | SYSTOLIC BLOOD PRESSURE: 123 MMHG | HEIGHT: 70 IN | OXYGEN SATURATION: 99 % | RESPIRATION RATE: 18 BRPM | WEIGHT: 136.91 LBS | DIASTOLIC BLOOD PRESSURE: 78 MMHG

## 2020-04-20 LAB
ALBUMIN SERPL BCP-MCNC: 2.5 G/DL (ref 3.5–5)
ALP SERPL-CCNC: 253 U/L (ref 46–116)
ALT SERPL W P-5'-P-CCNC: 945 U/L (ref 12–78)
ANION GAP SERPL CALCULATED.3IONS-SCNC: 8 MMOL/L (ref 4–13)
AST SERPL W P-5'-P-CCNC: 723 U/L (ref 5–45)
BILIRUB SERPL-MCNC: 0.56 MG/DL (ref 0.2–1)
BUN SERPL-MCNC: 11 MG/DL (ref 5–25)
CALCIUM SERPL-MCNC: 8.2 MG/DL (ref 8.3–10.1)
CHLORIDE SERPL-SCNC: 104 MMOL/L (ref 100–108)
CO2 SERPL-SCNC: 27 MMOL/L (ref 21–32)
CREAT SERPL-MCNC: 0.79 MG/DL (ref 0.6–1.3)
ERYTHROCYTE [DISTWIDTH] IN BLOOD BY AUTOMATED COUNT: 12.3 % (ref 11.6–15.1)
FERRITIN SERPL-MCNC: 666 NG/ML (ref 8–388)
GFR SERPL CREATININE-BSD FRML MDRD: 112 ML/MIN/1.73SQ M
GLUCOSE SERPL-MCNC: 125 MG/DL (ref 65–140)
GLUCOSE SERPL-MCNC: 139 MG/DL (ref 65–140)
GLUCOSE SERPL-MCNC: 149 MG/DL (ref 65–140)
GLUCOSE SERPL-MCNC: 157 MG/DL (ref 65–140)
GLUCOSE SERPL-MCNC: 193 MG/DL (ref 65–140)
GLUCOSE SERPL-MCNC: 196 MG/DL (ref 65–140)
GLUCOSE SERPL-MCNC: 340 MG/DL (ref 65–140)
GLUCOSE SERPL-MCNC: 408 MG/DL (ref 65–140)
GLUCOSE SERPL-MCNC: 64 MG/DL (ref 65–140)
HCT VFR BLD AUTO: 43.4 % (ref 36.5–49.3)
HGB BLD-MCNC: 14.3 G/DL (ref 12–17)
IRON SATN MFR SERPL: 40 %
IRON SERPL-MCNC: 93 UG/DL (ref 65–175)
MCH RBC QN AUTO: 30.5 PG (ref 26.8–34.3)
MCHC RBC AUTO-ENTMCNC: 32.9 G/DL (ref 31.4–37.4)
MCV RBC AUTO: 93 FL (ref 82–98)
PLATELET # BLD AUTO: 334 THOUSANDS/UL (ref 149–390)
PMV BLD AUTO: 11 FL (ref 8.9–12.7)
POTASSIUM SERPL-SCNC: 3.4 MMOL/L (ref 3.5–5.3)
PROT SERPL-MCNC: 6.3 G/DL (ref 6.4–8.2)
RBC # BLD AUTO: 4.69 MILLION/UL (ref 3.88–5.62)
SODIUM SERPL-SCNC: 139 MMOL/L (ref 136–145)
TIBC SERPL-MCNC: 235 UG/DL (ref 250–450)
WBC # BLD AUTO: 11.98 THOUSAND/UL (ref 4.31–10.16)

## 2020-04-20 PROCEDURE — 82948 REAGENT STRIP/BLOOD GLUCOSE: CPT

## 2020-04-20 PROCEDURE — 90471 IMMUNIZATION ADMIN: CPT | Performed by: INTERNAL MEDICINE

## 2020-04-20 PROCEDURE — 90686 IIV4 VACC NO PRSV 0.5 ML IM: CPT | Performed by: INTERNAL MEDICINE

## 2020-04-20 PROCEDURE — 94762 N-INVAS EAR/PLS OXIMTRY CONT: CPT

## 2020-04-20 PROCEDURE — 99232 SBSQ HOSP IP/OBS MODERATE 35: CPT | Performed by: HOSPITALIST

## 2020-04-20 PROCEDURE — 80053 COMPREHEN METABOLIC PANEL: CPT | Performed by: INTERNAL MEDICINE

## 2020-04-20 PROCEDURE — 85027 COMPLETE CBC AUTOMATED: CPT | Performed by: INTERNAL MEDICINE

## 2020-04-20 RX ADMIN — NICOTINE 1 PATCH: 14 PATCH TRANSDERMAL at 09:12

## 2020-04-20 RX ADMIN — INFLUENZA VIRUS VACCINE 0.5 ML: 15; 15; 15; 15 SUSPENSION INTRAMUSCULAR at 11:11

## 2020-04-20 RX ADMIN — LEVOTHYROXINE SODIUM 75 MCG: 75 TABLET ORAL at 05:12

## 2020-04-20 RX ADMIN — SODIUM CHLORIDE 14 UNITS/HR: 9 INJECTION, SOLUTION INTRAVENOUS at 00:02

## 2020-05-06 ENCOUNTER — APPOINTMENT (EMERGENCY)
Dept: RADIOLOGY | Facility: HOSPITAL | Age: 40
DRG: 420 | End: 2020-05-06
Payer: COMMERCIAL

## 2020-05-06 ENCOUNTER — HOSPITAL ENCOUNTER (EMERGENCY)
Facility: HOSPITAL | Age: 40
Discharge: LEFT BEFORE DISCHARGE | DRG: 420 | End: 2020-05-06
Attending: EMERGENCY MEDICINE | Admitting: EMERGENCY MEDICINE
Payer: COMMERCIAL

## 2020-05-06 VITALS
RESPIRATION RATE: 18 BRPM | BODY MASS INDEX: 19.51 KG/M2 | OXYGEN SATURATION: 99 % | DIASTOLIC BLOOD PRESSURE: 80 MMHG | HEART RATE: 73 BPM | SYSTOLIC BLOOD PRESSURE: 136 MMHG | WEIGHT: 136 LBS | TEMPERATURE: 98.2 F

## 2020-05-06 DIAGNOSIS — R73.9 HYPERGLYCEMIA: Primary | ICD-10-CM

## 2020-05-06 DIAGNOSIS — R74.01 TRANSAMINITIS: ICD-10-CM

## 2020-05-06 DIAGNOSIS — Z53.29 LEFT AGAINST MEDICAL ADVICE: ICD-10-CM

## 2020-05-06 LAB
ALBUMIN SERPL BCP-MCNC: 3.3 G/DL (ref 3.5–5)
ALP SERPL-CCNC: 217 U/L (ref 46–116)
ALT SERPL W P-5'-P-CCNC: 461 U/L (ref 12–78)
ANION GAP SERPL CALCULATED.3IONS-SCNC: 8 MMOL/L (ref 4–13)
AST SERPL W P-5'-P-CCNC: 194 U/L (ref 5–45)
ATRIAL RATE: 68 BPM
BASOPHILS # BLD AUTO: 0.06 THOUSANDS/ΜL (ref 0–0.1)
BASOPHILS NFR BLD AUTO: 1 % (ref 0–1)
BETA-HYDROXYBUTYRATE: 0.1 MMOL/L
BILIRUB SERPL-MCNC: 0.7 MG/DL (ref 0.2–1)
BUN SERPL-MCNC: 19 MG/DL (ref 5–25)
CALCIUM SERPL-MCNC: 9.3 MG/DL (ref 8.3–10.1)
CHLORIDE SERPL-SCNC: 94 MMOL/L (ref 100–108)
CO2 SERPL-SCNC: 27 MMOL/L (ref 21–32)
CREAT SERPL-MCNC: 1.03 MG/DL (ref 0.6–1.3)
EOSINOPHIL # BLD AUTO: 0.25 THOUSAND/ΜL (ref 0–0.61)
EOSINOPHIL NFR BLD AUTO: 3 % (ref 0–6)
ERYTHROCYTE [DISTWIDTH] IN BLOOD BY AUTOMATED COUNT: 12.2 % (ref 11.6–15.1)
GFR SERPL CREATININE-BSD FRML MDRD: 90 ML/MIN/1.73SQ M
GLUCOSE SERPL-MCNC: 281 MG/DL (ref 65–140)
GLUCOSE SERPL-MCNC: 536 MG/DL (ref 65–140)
GLUCOSE SERPL-MCNC: >500 MG/DL (ref 65–140)
HCT VFR BLD AUTO: 40.9 % (ref 36.5–49.3)
HGB BLD-MCNC: 13.7 G/DL (ref 12–17)
IMM GRANULOCYTES # BLD AUTO: 0.03 THOUSAND/UL (ref 0–0.2)
IMM GRANULOCYTES NFR BLD AUTO: 0 % (ref 0–2)
LYMPHOCYTES # BLD AUTO: 2.89 THOUSANDS/ΜL (ref 0.6–4.47)
LYMPHOCYTES NFR BLD AUTO: 32 % (ref 14–44)
MCH RBC QN AUTO: 30.8 PG (ref 26.8–34.3)
MCHC RBC AUTO-ENTMCNC: 33.5 G/DL (ref 31.4–37.4)
MCV RBC AUTO: 92 FL (ref 82–98)
MONOCYTES # BLD AUTO: 0.82 THOUSAND/ΜL (ref 0.17–1.22)
MONOCYTES NFR BLD AUTO: 9 % (ref 4–12)
NEUTROPHILS # BLD AUTO: 5.02 THOUSANDS/ΜL (ref 1.85–7.62)
NEUTS SEG NFR BLD AUTO: 55 % (ref 43–75)
NRBC BLD AUTO-RTO: 0 /100 WBCS
P AXIS: 63 DEGREES
PLATELET # BLD AUTO: 319 THOUSANDS/UL (ref 149–390)
PMV BLD AUTO: 10.6 FL (ref 8.9–12.7)
POTASSIUM SERPL-SCNC: 4.5 MMOL/L (ref 3.5–5.3)
PR INTERVAL: 180 MS
PROT SERPL-MCNC: 7.5 G/DL (ref 6.4–8.2)
QRS AXIS: 98 DEGREES
QRSD INTERVAL: 104 MS
QT INTERVAL: 386 MS
QTC INTERVAL: 395 MS
RBC # BLD AUTO: 4.45 MILLION/UL (ref 3.88–5.62)
SODIUM SERPL-SCNC: 129 MMOL/L (ref 136–145)
T WAVE AXIS: 71 DEGREES
VENTRICULAR RATE: 63 BPM
WBC # BLD AUTO: 9.07 THOUSAND/UL (ref 4.31–10.16)

## 2020-05-06 PROCEDURE — 93010 ELECTROCARDIOGRAM REPORT: CPT | Performed by: INTERNAL MEDICINE

## 2020-05-06 PROCEDURE — 93005 ELECTROCARDIOGRAM TRACING: CPT

## 2020-05-06 PROCEDURE — 82948 REAGENT STRIP/BLOOD GLUCOSE: CPT

## 2020-05-06 PROCEDURE — 99285 EMERGENCY DEPT VISIT HI MDM: CPT | Performed by: PHYSICIAN ASSISTANT

## 2020-05-06 PROCEDURE — 96361 HYDRATE IV INFUSION ADD-ON: CPT

## 2020-05-06 PROCEDURE — 99285 EMERGENCY DEPT VISIT HI MDM: CPT

## 2020-05-06 PROCEDURE — 36415 COLL VENOUS BLD VENIPUNCTURE: CPT | Performed by: PHYSICIAN ASSISTANT

## 2020-05-06 PROCEDURE — 96374 THER/PROPH/DIAG INJ IV PUSH: CPT

## 2020-05-06 PROCEDURE — 85025 COMPLETE CBC W/AUTO DIFF WBC: CPT | Performed by: PHYSICIAN ASSISTANT

## 2020-05-06 PROCEDURE — 82010 KETONE BODYS QUAN: CPT | Performed by: PHYSICIAN ASSISTANT

## 2020-05-06 PROCEDURE — 71046 X-RAY EXAM CHEST 2 VIEWS: CPT

## 2020-05-06 PROCEDURE — 80053 COMPREHEN METABOLIC PANEL: CPT | Performed by: PHYSICIAN ASSISTANT

## 2020-05-06 RX ADMIN — INSULIN HUMAN 6 UNITS: 100 INJECTION, SOLUTION PARENTERAL at 14:50

## 2020-05-06 RX ADMIN — SODIUM CHLORIDE 1000 ML: 0.9 INJECTION, SOLUTION INTRAVENOUS at 14:15

## 2020-05-06 RX ADMIN — SODIUM CHLORIDE 1000 ML: 0.9 INJECTION, SOLUTION INTRAVENOUS at 15:25

## 2020-05-07 ENCOUNTER — HOSPITAL ENCOUNTER (INPATIENT)
Facility: HOSPITAL | Age: 40
LOS: 1 days | Discharge: LEFT BEFORE DISCHARGE | DRG: 420 | End: 2020-05-08
Attending: EMERGENCY MEDICINE | Admitting: INTERNAL MEDICINE
Payer: COMMERCIAL

## 2020-05-07 ENCOUNTER — APPOINTMENT (EMERGENCY)
Dept: CT IMAGING | Facility: HOSPITAL | Age: 40
DRG: 420 | End: 2020-05-07
Payer: COMMERCIAL

## 2020-05-07 DIAGNOSIS — Z91.19 MEDICALLY NONCOMPLIANT: ICD-10-CM

## 2020-05-07 DIAGNOSIS — E03.9 ACQUIRED HYPOTHYROIDISM: ICD-10-CM

## 2020-05-07 DIAGNOSIS — E11.65 SEVERE HYPERGLYCEMIA DUE TO DIABETES MELLITUS (HCC): Primary | ICD-10-CM
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
## 2020-05-07 DIAGNOSIS — E10.65 TYPE 1 DIABETES MELLITUS WITH HYPERGLYCEMIA (HCC): ICD-10-CM

## 2020-05-07 DIAGNOSIS — S09.90XA INJURY OF HEAD, INITIAL ENCOUNTER: ICD-10-CM

## 2020-05-07 DIAGNOSIS — F19.10 POLYSUBSTANCE ABUSE (HCC): ICD-10-CM

## 2020-05-07 PROBLEM — F17.200 NICOTINE DEPENDENCE: Chronic | Status: ACTIVE | Noted: 2020-04-19

## 2020-05-07 PROBLEM — F15.10 METHAMPHETAMINE ABUSE (HCC): Chronic | Status: ACTIVE | Noted: 2020-05-07

## 2020-05-07 LAB
ALBUMIN SERPL BCP-MCNC: 3.4 G/DL (ref 3.5–5)
ALP SERPL-CCNC: 198 U/L (ref 46–116)
ALT SERPL W P-5'-P-CCNC: 487 U/L (ref 12–78)
AMPHETAMINES SERPL QL SCN: POSITIVE
ANION GAP SERPL CALCULATED.3IONS-SCNC: 7 MMOL/L (ref 4–13)
ANION GAP SERPL CALCULATED.3IONS-SCNC: 7 MMOL/L (ref 4–13)
AST SERPL W P-5'-P-CCNC: 253 U/L (ref 5–45)
BACTERIA UR QL AUTO: ABNORMAL /HPF
BARBITURATES UR QL: NEGATIVE
BASOPHILS # BLD AUTO: 0.05 THOUSANDS/ΜL (ref 0–0.1)
BASOPHILS NFR BLD AUTO: 1 % (ref 0–1)
BENZODIAZ UR QL: NEGATIVE
BILIRUB SERPL-MCNC: 0.8 MG/DL (ref 0.2–1)
BILIRUB UR QL STRIP: NEGATIVE
BUN SERPL-MCNC: 16 MG/DL (ref 5–25)
BUN SERPL-MCNC: 20 MG/DL (ref 5–25)
CALCIUM SERPL-MCNC: 7.7 MG/DL (ref 8.3–10.1)
CALCIUM SERPL-MCNC: 8.7 MG/DL (ref 8.3–10.1)
CHLORIDE SERPL-SCNC: 94 MMOL/L (ref 100–108)
CHLORIDE SERPL-SCNC: 96 MMOL/L (ref 100–108)
CLARITY UR: CLEAR
CLARITY, POC: NORMAL
CO2 SERPL-SCNC: 28 MMOL/L (ref 21–32)
CO2 SERPL-SCNC: 28 MMOL/L (ref 21–32)
COCAINE UR QL: NEGATIVE
COLOR UR: ABNORMAL
COLOR, POC: YELLOW
CREAT SERPL-MCNC: 0.98 MG/DL (ref 0.6–1.3)
CREAT SERPL-MCNC: 1.13 MG/DL (ref 0.6–1.3)
EOSINOPHIL # BLD AUTO: 0.15 THOUSAND/ΜL (ref 0–0.61)
EOSINOPHIL NFR BLD AUTO: 2 % (ref 0–6)
ERYTHROCYTE [DISTWIDTH] IN BLOOD BY AUTOMATED COUNT: 12.2 % (ref 11.6–15.1)
EXT BILIRUBIN, UA: NEGATIVE
EXT BLOOD URINE: NEGATIVE
EXT GLUCOSE, UA: NEGATIVE
EXT KETONES: NEGATIVE
EXT NITRITE, UA: NEGATIVE
EXT PH, UA: 7
EXT PROTEIN, UA: NEGATIVE
EXT SPECIFIC GRAVITY, UA: 1.02
EXT UROBILINOGEN: 0.3
GFR SERPL CREATININE-BSD FRML MDRD: 81 ML/MIN/1.73SQ M
GFR SERPL CREATININE-BSD FRML MDRD: 96 ML/MIN/1.73SQ M
GLUCOSE SERPL-MCNC: 591 MG/DL (ref 65–140)
GLUCOSE SERPL-MCNC: 605 MG/DL (ref 65–140)
GLUCOSE SERPL-MCNC: >500 MG/DL (ref 65–140)
GLUCOSE SERPL-MCNC: >500 MG/DL (ref 65–140)
GLUCOSE UR STRIP-MCNC: ABNORMAL MG/DL
HCT VFR BLD AUTO: 41 % (ref 36.5–49.3)
HGB BLD-MCNC: 13.7 G/DL (ref 12–17)
HGB UR QL STRIP.AUTO: NEGATIVE
IMM GRANULOCYTES # BLD AUTO: 0.03 THOUSAND/UL (ref 0–0.2)
IMM GRANULOCYTES NFR BLD AUTO: 0 % (ref 0–2)
KETONES UR STRIP-MCNC: NEGATIVE MG/DL
LEUKOCYTE ESTERASE UR QL STRIP: ABNORMAL
LYMPHOCYTES # BLD AUTO: 2.9 THOUSANDS/ΜL (ref 0.6–4.47)
LYMPHOCYTES NFR BLD AUTO: 31 % (ref 14–44)
MAGNESIUM SERPL-MCNC: 1.5 MG/DL (ref 1.6–2.6)
MCH RBC QN AUTO: 31.1 PG (ref 26.8–34.3)
MCHC RBC AUTO-ENTMCNC: 33.4 G/DL (ref 31.4–37.4)
MCV RBC AUTO: 93 FL (ref 82–98)
METHADONE UR QL: NEGATIVE
MONOCYTES # BLD AUTO: 0.63 THOUSAND/ΜL (ref 0.17–1.22)
MONOCYTES NFR BLD AUTO: 7 % (ref 4–12)
MUCOUS THREADS UR QL AUTO: ABNORMAL
NEUTROPHILS # BLD AUTO: 5.73 THOUSANDS/ΜL (ref 1.85–7.62)
NEUTS SEG NFR BLD AUTO: 59 % (ref 43–75)
NITRITE UR QL STRIP: NEGATIVE
NON-SQ EPI CELLS URNS QL MICRO: ABNORMAL /HPF
NRBC BLD AUTO-RTO: 0 /100 WBCS
OPIATES UR QL SCN: NEGATIVE
PCP UR QL: NEGATIVE
PH UR STRIP.AUTO: 6.5 [PH]
PHOSPHATE SERPL-MCNC: 2.8 MG/DL (ref 2.7–4.5)
PLATELET # BLD AUTO: 283 THOUSANDS/UL (ref 149–390)
PMV BLD AUTO: 11.4 FL (ref 8.9–12.7)
POTASSIUM SERPL-SCNC: 3.7 MMOL/L (ref 3.5–5.3)
POTASSIUM SERPL-SCNC: 3.7 MMOL/L (ref 3.5–5.3)
PROT SERPL-MCNC: 7.3 G/DL (ref 6.4–8.2)
PROT UR STRIP-MCNC: NEGATIVE MG/DL
RBC # BLD AUTO: 4.41 MILLION/UL (ref 3.88–5.62)
RBC #/AREA URNS AUTO: ABNORMAL /HPF
SODIUM SERPL-SCNC: 129 MMOL/L (ref 136–145)
SODIUM SERPL-SCNC: 131 MMOL/L (ref 136–145)
SP GR UR STRIP.AUTO: <=1.005 (ref 1–1.03)
THC UR QL: POSITIVE
TSH SERPL DL<=0.05 MIU/L-ACNC: 12.02 UIU/ML (ref 0.36–3.74)
UROBILINOGEN UR QL STRIP.AUTO: 0.2 E.U./DL
WBC # BLD AUTO: 9.49 THOUSAND/UL (ref 4.31–10.16)
WBC # BLD EST: NEGATIVE 10*3/UL
WBC #/AREA URNS AUTO: ABNORMAL /HPF

## 2020-05-07 PROCEDURE — 82948 REAGENT STRIP/BLOOD GLUCOSE: CPT

## 2020-05-07 PROCEDURE — 99222 1ST HOSP IP/OBS MODERATE 55: CPT | Performed by: INTERNAL MEDICINE

## 2020-05-07 PROCEDURE — 82330 ASSAY OF CALCIUM: CPT

## 2020-05-07 PROCEDURE — 84100 ASSAY OF PHOSPHORUS: CPT | Performed by: INTERNAL MEDICINE

## 2020-05-07 PROCEDURE — 99285 EMERGENCY DEPT VISIT HI MDM: CPT

## 2020-05-07 PROCEDURE — 80048 BASIC METABOLIC PNL TOTAL CA: CPT | Performed by: INTERNAL MEDICINE

## 2020-05-07 PROCEDURE — 82803 BLOOD GASES ANY COMBINATION: CPT

## 2020-05-07 PROCEDURE — 36415 COLL VENOUS BLD VENIPUNCTURE: CPT | Performed by: EMERGENCY MEDICINE

## 2020-05-07 PROCEDURE — 85014 HEMATOCRIT: CPT

## 2020-05-07 PROCEDURE — 70450 CT HEAD/BRAIN W/O DYE: CPT

## 2020-05-07 PROCEDURE — 80307 DRUG TEST PRSMV CHEM ANLYZR: CPT | Performed by: EMERGENCY MEDICINE

## 2020-05-07 PROCEDURE — 84443 ASSAY THYROID STIM HORMONE: CPT | Performed by: INTERNAL MEDICINE

## 2020-05-07 PROCEDURE — 82947 ASSAY GLUCOSE BLOOD QUANT: CPT

## 2020-05-07 PROCEDURE — 84132 ASSAY OF SERUM POTASSIUM: CPT

## 2020-05-07 PROCEDURE — 81001 URINALYSIS AUTO W/SCOPE: CPT | Performed by: INTERNAL MEDICINE

## 2020-05-07 PROCEDURE — 85025 COMPLETE CBC W/AUTO DIFF WBC: CPT | Performed by: EMERGENCY MEDICINE

## 2020-05-07 PROCEDURE — 96374 THER/PROPH/DIAG INJ IV PUSH: CPT

## 2020-05-07 PROCEDURE — 99285 EMERGENCY DEPT VISIT HI MDM: CPT | Performed by: EMERGENCY MEDICINE

## 2020-05-07 PROCEDURE — 84439 ASSAY OF FREE THYROXINE: CPT | Performed by: INTERNAL MEDICINE

## 2020-05-07 PROCEDURE — 96372 THER/PROPH/DIAG INJ SC/IM: CPT

## 2020-05-07 PROCEDURE — 96361 HYDRATE IV INFUSION ADD-ON: CPT

## 2020-05-07 PROCEDURE — 83735 ASSAY OF MAGNESIUM: CPT | Performed by: INTERNAL MEDICINE

## 2020-05-07 PROCEDURE — 84295 ASSAY OF SERUM SODIUM: CPT

## 2020-05-07 PROCEDURE — 80053 COMPREHEN METABOLIC PANEL: CPT | Performed by: EMERGENCY MEDICINE

## 2020-05-07 RX ORDER — MAGNESIUM SULFATE HEPTAHYDRATE 40 MG/ML
2 INJECTION, SOLUTION INTRAVENOUS ONCE
Status: COMPLETED | OUTPATIENT
Start: 2020-05-07 | End: 2020-05-08

## 2020-05-07 RX ORDER — LEVOTHYROXINE SODIUM 0.07 MG/1
75 TABLET ORAL DAILY
Status: DISCONTINUED | OUTPATIENT
Start: 2020-05-08 | End: 2020-05-08 | Stop reason: HOSPADM

## 2020-05-07 RX ORDER — SODIUM CHLORIDE 9 MG/ML
500 INJECTION, SOLUTION INTRAVENOUS CONTINUOUS
Status: DISPENSED | OUTPATIENT
Start: 2020-05-07 | End: 2020-05-08

## 2020-05-07 RX ORDER — SODIUM CHLORIDE 9 MG/ML
1000 INJECTION, SOLUTION INTRAVENOUS CONTINUOUS
Status: DISPENSED | OUTPATIENT
Start: 2020-05-07 | End: 2020-05-07

## 2020-05-07 RX ORDER — CLONIDINE HYDROCHLORIDE 0.1 MG/1
0.2 TABLET ORAL ONCE
Status: COMPLETED | OUTPATIENT
Start: 2020-05-07 | End: 2020-05-07

## 2020-05-07 RX ORDER — ACETAMINOPHEN 325 MG/1
650 TABLET ORAL EVERY 6 HOURS PRN
Status: DISCONTINUED | OUTPATIENT
Start: 2020-05-07 | End: 2020-05-08

## 2020-05-07 RX ORDER — SODIUM CHLORIDE 9 MG/ML
250 INJECTION, SOLUTION INTRAVENOUS CONTINUOUS
Status: DISCONTINUED | OUTPATIENT
Start: 2020-05-08 | End: 2020-05-08 | Stop reason: SDUPTHER

## 2020-05-07 RX ORDER — LORAZEPAM 2 MG/ML
1 INJECTION INTRAMUSCULAR ONCE
Status: COMPLETED | OUTPATIENT
Start: 2020-05-07 | End: 2020-05-07

## 2020-05-07 RX ORDER — DEXTROSE AND SODIUM CHLORIDE 5; .9 G/100ML; G/100ML
250 INJECTION, SOLUTION INTRAVENOUS CONTINUOUS
Status: DISCONTINUED | OUTPATIENT
Start: 2020-05-07 | End: 2020-05-08

## 2020-05-07 RX ADMIN — INSULIN HUMAN 10 UNITS: 100 INJECTION, SOLUTION PARENTERAL at 16:16

## 2020-05-07 RX ADMIN — INSULIN HUMAN 5 UNITS: 100 INJECTION, SOLUTION PARENTERAL at 21:01

## 2020-05-07 RX ADMIN — LORAZEPAM 1 MG: 2 INJECTION INTRAMUSCULAR; INTRAVENOUS at 14:32

## 2020-05-07 RX ADMIN — SODIUM CHLORIDE 1000 ML/HR: 0.9 INJECTION, SOLUTION INTRAVENOUS at 20:27

## 2020-05-07 RX ADMIN — SODIUM CHLORIDE 500 ML/HR: 0.9 INJECTION, SOLUTION INTRAVENOUS at 21:48

## 2020-05-07 RX ADMIN — SODIUM CHLORIDE 1000 ML: 0.9 INJECTION, SOLUTION INTRAVENOUS at 13:34

## 2020-05-07 RX ADMIN — SODIUM CHLORIDE 12 UNITS/HR: 9 INJECTION, SOLUTION INTRAVENOUS at 22:21

## 2020-05-07 RX ADMIN — CLONIDINE HYDROCHLORIDE 0.2 MG: 0.1 TABLET ORAL at 14:29

## 2020-05-07 RX ADMIN — SODIUM CHLORIDE 1000 ML: 0.9 INJECTION, SOLUTION INTRAVENOUS at 16:17

## 2020-05-08 ENCOUNTER — APPOINTMENT (INPATIENT)
Dept: ULTRASOUND IMAGING | Facility: HOSPITAL | Age: 40
DRG: 420 | End: 2020-05-08
Payer: COMMERCIAL

## 2020-05-08 VITALS
HEIGHT: 71 IN | HEART RATE: 55 BPM | BODY MASS INDEX: 19.61 KG/M2 | DIASTOLIC BLOOD PRESSURE: 70 MMHG | RESPIRATION RATE: 18 BRPM | WEIGHT: 140.1 LBS | TEMPERATURE: 98.2 F | OXYGEN SATURATION: 98 % | SYSTOLIC BLOOD PRESSURE: 123 MMHG

## 2020-05-08 PROBLEM — E87.6 HYPOKALEMIA: Status: ACTIVE | Noted: 2020-05-08

## 2020-05-08 LAB
ALBUMIN SERPL BCP-MCNC: 2.6 G/DL (ref 3.5–5)
ALP SERPL-CCNC: 155 U/L (ref 46–116)
ALT SERPL W P-5'-P-CCNC: 367 U/L (ref 12–78)
ANION GAP SERPL CALCULATED.3IONS-SCNC: 8 MMOL/L (ref 4–13)
ANION GAP SERPL CALCULATED.3IONS-SCNC: 8 MMOL/L (ref 4–13)
ANION GAP SERPL CALCULATED.3IONS-SCNC: 9 MMOL/L (ref 4–13)
AST SERPL W P-5'-P-CCNC: 176 U/L (ref 5–45)
BILIRUB SERPL-MCNC: 0.6 MG/DL (ref 0.2–1)
BUN SERPL-MCNC: 12 MG/DL (ref 5–25)
BUN SERPL-MCNC: 13 MG/DL (ref 5–25)
BUN SERPL-MCNC: 13 MG/DL (ref 5–25)
CALCIUM SERPL-MCNC: 7.5 MG/DL (ref 8.3–10.1)
CALCIUM SERPL-MCNC: 7.5 MG/DL (ref 8.3–10.1)
CALCIUM SERPL-MCNC: 7.8 MG/DL (ref 8.3–10.1)
CHLORIDE SERPL-SCNC: 100 MMOL/L (ref 100–108)
CHLORIDE SERPL-SCNC: 104 MMOL/L (ref 100–108)
CHLORIDE SERPL-SCNC: 106 MMOL/L (ref 100–108)
CO2 SERPL-SCNC: 23 MMOL/L (ref 21–32)
CO2 SERPL-SCNC: 25 MMOL/L (ref 21–32)
CO2 SERPL-SCNC: 26 MMOL/L (ref 21–32)
CREAT SERPL-MCNC: 0.67 MG/DL (ref 0.6–1.3)
CREAT SERPL-MCNC: 0.73 MG/DL (ref 0.6–1.3)
CREAT SERPL-MCNC: 0.77 MG/DL (ref 0.6–1.3)
ERYTHROCYTE [DISTWIDTH] IN BLOOD BY AUTOMATED COUNT: 12.4 % (ref 11.6–15.1)
GFR SERPL CREATININE-BSD FRML MDRD: 114 ML/MIN/1.73SQ M
GFR SERPL CREATININE-BSD FRML MDRD: 116 ML/MIN/1.73SQ M
GFR SERPL CREATININE-BSD FRML MDRD: 120 ML/MIN/1.73SQ M
GLUCOSE SERPL-MCNC: 114 MG/DL (ref 65–140)
GLUCOSE SERPL-MCNC: 174 MG/DL (ref 65–140)
GLUCOSE SERPL-MCNC: 195 MG/DL (ref 65–140)
GLUCOSE SERPL-MCNC: 200 MG/DL (ref 65–140)
GLUCOSE SERPL-MCNC: 212 MG/DL (ref 65–140)
GLUCOSE SERPL-MCNC: 300 MG/DL (ref 65–140)
GLUCOSE SERPL-MCNC: 33 MG/DL (ref 65–140)
GLUCOSE SERPL-MCNC: 45 MG/DL (ref 65–140)
GLUCOSE SERPL-MCNC: 496 MG/DL (ref 65–140)
GLUCOSE SERPL-MCNC: >500 MG/DL (ref 65–140)
HCT VFR BLD AUTO: 38.3 % (ref 36.5–49.3)
HGB BLD-MCNC: 12.8 G/DL (ref 12–17)
MAGNESIUM SERPL-MCNC: 2.1 MG/DL (ref 1.6–2.6)
MCH RBC QN AUTO: 30.8 PG (ref 26.8–34.3)
MCHC RBC AUTO-ENTMCNC: 33.4 G/DL (ref 31.4–37.4)
MCV RBC AUTO: 92 FL (ref 82–98)
PHOSPHATE SERPL-MCNC: 1.9 MG/DL (ref 2.7–4.5)
PLATELET # BLD AUTO: 265 THOUSANDS/UL (ref 149–390)
PMV BLD AUTO: 10.5 FL (ref 8.9–12.7)
POTASSIUM SERPL-SCNC: 2.9 MMOL/L (ref 3.5–5.3)
POTASSIUM SERPL-SCNC: 3.1 MMOL/L (ref 3.5–5.3)
POTASSIUM SERPL-SCNC: 4.5 MMOL/L (ref 3.5–5.3)
PROT SERPL-MCNC: 5.9 G/DL (ref 6.4–8.2)
RBC # BLD AUTO: 4.16 MILLION/UL (ref 3.88–5.62)
SODIUM SERPL-SCNC: 131 MMOL/L (ref 136–145)
SODIUM SERPL-SCNC: 138 MMOL/L (ref 136–145)
SODIUM SERPL-SCNC: 140 MMOL/L (ref 136–145)
T4 FREE SERPL-MCNC: 0.72 NG/DL (ref 0.76–1.46)
WBC # BLD AUTO: 10.75 THOUSAND/UL (ref 4.31–10.16)

## 2020-05-08 PROCEDURE — 83735 ASSAY OF MAGNESIUM: CPT | Performed by: INTERNAL MEDICINE

## 2020-05-08 PROCEDURE — 99232 SBSQ HOSP IP/OBS MODERATE 35: CPT | Performed by: INTERNAL MEDICINE

## 2020-05-08 PROCEDURE — 84100 ASSAY OF PHOSPHORUS: CPT | Performed by: INTERNAL MEDICINE

## 2020-05-08 PROCEDURE — 76705 ECHO EXAM OF ABDOMEN: CPT

## 2020-05-08 PROCEDURE — 99238 HOSP IP/OBS DSCHRG MGMT 30/<: CPT | Performed by: INTERNAL MEDICINE

## 2020-05-08 PROCEDURE — 85027 COMPLETE CBC AUTOMATED: CPT | Performed by: INTERNAL MEDICINE

## 2020-05-08 PROCEDURE — 80048 BASIC METABOLIC PNL TOTAL CA: CPT | Performed by: INTERNAL MEDICINE

## 2020-05-08 PROCEDURE — 80053 COMPREHEN METABOLIC PANEL: CPT | Performed by: INTERNAL MEDICINE

## 2020-05-08 PROCEDURE — 82948 REAGENT STRIP/BLOOD GLUCOSE: CPT

## 2020-05-08 RX ORDER — POTASSIUM CHLORIDE 14.9 MG/ML
20 INJECTION INTRAVENOUS ONCE
Status: DISCONTINUED | OUTPATIENT
Start: 2020-05-08 | End: 2020-05-08

## 2020-05-08 RX ORDER — POTASSIUM CHLORIDE 20 MEQ/1
20 TABLET, EXTENDED RELEASE ORAL
Status: DISCONTINUED | OUTPATIENT
Start: 2020-05-08 | End: 2020-05-08

## 2020-05-08 RX ORDER — SODIUM CHLORIDE 9 MG/ML
125 INJECTION, SOLUTION INTRAVENOUS CONTINUOUS
Status: DISCONTINUED | OUTPATIENT
Start: 2020-05-08 | End: 2020-05-08

## 2020-05-08 RX ORDER — DEXTROSE MONOHYDRATE 25 G/50ML
25 INJECTION, SOLUTION INTRAVENOUS ONCE
Status: DISCONTINUED | OUTPATIENT
Start: 2020-05-08 | End: 2020-05-08 | Stop reason: HOSPADM

## 2020-05-08 RX ORDER — POTASSIUM CHLORIDE 14.9 MG/ML
20 INJECTION INTRAVENOUS
Status: DISPENSED | OUTPATIENT
Start: 2020-05-08 | End: 2020-05-08

## 2020-05-08 RX ORDER — DEXTROSE MONOHYDRATE 25 G/50ML
25 INJECTION, SOLUTION INTRAVENOUS ONCE
Status: DISCONTINUED | OUTPATIENT
Start: 2020-05-08 | End: 2020-05-08

## 2020-05-08 RX ORDER — POTASSIUM CHLORIDE 20 MEQ/1
40 TABLET, EXTENDED RELEASE ORAL ONCE
Status: COMPLETED | OUTPATIENT
Start: 2020-05-08 | End: 2020-05-08

## 2020-05-08 RX ADMIN — LEVOTHYROXINE SODIUM 75 MCG: 75 TABLET ORAL at 06:23

## 2020-05-08 RX ADMIN — SODIUM CHLORIDE 125 ML/HR: 0.9 INJECTION, SOLUTION INTRAVENOUS at 03:30

## 2020-05-08 RX ADMIN — SODIUM CHLORIDE 250 ML/HR: 0.9 INJECTION, SOLUTION INTRAVENOUS at 02:54

## 2020-05-08 RX ADMIN — DEXTROSE MONOHYDRATE 25 ML: 500 INJECTION PARENTERAL at 04:40

## 2020-05-08 RX ADMIN — POTASSIUM CHLORIDE 20 MEQ: 14.9 INJECTION, SOLUTION INTRAVENOUS at 05:12

## 2020-05-08 RX ADMIN — INSULIN HUMAN 24 UNITS: 100 INJECTION, SUSPENSION SUBCUTANEOUS at 13:32

## 2020-05-08 RX ADMIN — POTASSIUM CHLORIDE 40 MEQ: 1500 TABLET, EXTENDED RELEASE ORAL at 06:33

## 2020-05-08 RX ADMIN — MAGNESIUM SULFATE HEPTAHYDRATE 2 G: 40 INJECTION, SOLUTION INTRAVENOUS at 00:10

## 2020-05-13 LAB
BASE EXCESS BLDA CALC-SCNC: 1 MMOL/L (ref -2–3)
CA-I BLD-SCNC: 1.14 MMOL/L (ref 1.12–1.32)
GLUCOSE SERPL-MCNC: 598 MG/DL (ref 65–140)
HCO3 BLDA-SCNC: 26.4 MMOL/L (ref 24–30)
HCT VFR BLD CALC: 42 % (ref 36.5–49.3)
HGB BLDA-MCNC: 14.3 G/DL (ref 12–17)
PCO2 BLD: 28 MMOL/L (ref 21–32)
PCO2 BLD: 41.9 MM HG (ref 42–50)
PH BLD: 7.41 [PH] (ref 7.3–7.4)
PO2 BLD: 68 MM HG (ref 35–45)
POTASSIUM BLD-SCNC: 3.9 MMOL/L (ref 3.5–5.3)
SAO2 % BLD FROM PO2: 93 % (ref 60–85)
SODIUM BLD-SCNC: 133 MMOL/L (ref 136–145)
SPECIMEN SOURCE: ABNORMAL

## 2020-05-14 ENCOUNTER — HOSPITAL ENCOUNTER (OUTPATIENT)
Facility: HOSPITAL | Age: 40
Setting detail: OBSERVATION
Discharge: LEFT BEFORE DISCHARGE | End: 2020-05-15
Attending: EMERGENCY MEDICINE | Admitting: INTERNAL MEDICINE
Payer: COMMERCIAL

## 2020-05-14 ENCOUNTER — APPOINTMENT (EMERGENCY)
Dept: RADIOLOGY | Facility: HOSPITAL | Age: 40
End: 2020-05-14
Payer: COMMERCIAL

## 2020-05-14 DIAGNOSIS — E11.65 SEVERE HYPERGLYCEMIA DUE TO DIABETES MELLITUS (HCC): Primary | ICD-10-CM

## 2020-05-14 PROBLEM — E11.9 DIABETES (HCC): Status: ACTIVE | Noted: 2020-05-14

## 2020-05-14 LAB
ALBUMIN SERPL BCP-MCNC: 3.2 G/DL (ref 3.5–5)
ALP SERPL-CCNC: 205 U/L (ref 46–116)
ALT SERPL W P-5'-P-CCNC: 494 U/L (ref 12–78)
ANION GAP SERPL CALCULATED.3IONS-SCNC: 10 MMOL/L (ref 4–13)
AST SERPL W P-5'-P-CCNC: 89 U/L (ref 5–45)
BACTERIA UR QL AUTO: NORMAL /HPF
BASE EX.OXY STD BLDV CALC-SCNC: 91.9 % (ref 60–80)
BASE EXCESS BLDV CALC-SCNC: -3 MMOL/L
BASOPHILS # BLD AUTO: 0.05 THOUSANDS/ΜL (ref 0–0.1)
BASOPHILS NFR BLD AUTO: 1 % (ref 0–1)
BETA-HYDROXYBUTYRATE: 0.1 MMOL/L
BILIRUB SERPL-MCNC: 0.76 MG/DL (ref 0.2–1)
BILIRUB UR QL STRIP: NEGATIVE
BUN SERPL-MCNC: 24 MG/DL (ref 5–25)
CALCIUM SERPL-MCNC: 8.1 MG/DL (ref 8.3–10.1)
CHLORIDE SERPL-SCNC: 88 MMOL/L (ref 100–108)
CLARITY UR: CLEAR
CO2 SERPL-SCNC: 25 MMOL/L (ref 21–32)
COLOR UR: YELLOW
COLOR, POC: NORMAL
CREAT SERPL-MCNC: 1.43 MG/DL (ref 0.6–1.3)
EOSINOPHIL # BLD AUTO: 0.17 THOUSAND/ΜL (ref 0–0.61)
EOSINOPHIL NFR BLD AUTO: 2 % (ref 0–6)
ERYTHROCYTE [DISTWIDTH] IN BLOOD BY AUTOMATED COUNT: 12.5 % (ref 11.6–15.1)
GFR SERPL CREATININE-BSD FRML MDRD: 61 ML/MIN/1.73SQ M
GLUCOSE SERPL-MCNC: 175 MG/DL (ref 65–140)
GLUCOSE SERPL-MCNC: 312 MG/DL (ref 65–140)
GLUCOSE SERPL-MCNC: 373 MG/DL (ref 65–140)
GLUCOSE SERPL-MCNC: 946 MG/DL (ref 65–140)
GLUCOSE SERPL-MCNC: >500 MG/DL (ref 65–140)
GLUCOSE UR STRIP-MCNC: ABNORMAL MG/DL
HCO3 BLDV-SCNC: 18.1 MMOL/L (ref 24–30)
HCT VFR BLD AUTO: 38.3 % (ref 36.5–49.3)
HGB BLD-MCNC: 12.8 G/DL (ref 12–17)
HGB UR QL STRIP.AUTO: ABNORMAL
IMM GRANULOCYTES # BLD AUTO: 0.03 THOUSAND/UL (ref 0–0.2)
IMM GRANULOCYTES NFR BLD AUTO: 0 % (ref 0–2)
KETONES UR STRIP-MCNC: NEGATIVE MG/DL
LEUKOCYTE ESTERASE UR QL STRIP: ABNORMAL
LYMPHOCYTES # BLD AUTO: 2.87 THOUSANDS/ΜL (ref 0.6–4.47)
LYMPHOCYTES NFR BLD AUTO: 32 % (ref 14–44)
MAGNESIUM SERPL-MCNC: 2 MG/DL (ref 1.6–2.6)
MCH RBC QN AUTO: 30.8 PG (ref 26.8–34.3)
MCHC RBC AUTO-ENTMCNC: 33.4 G/DL (ref 31.4–37.4)
MCV RBC AUTO: 92 FL (ref 82–98)
MONOCYTES # BLD AUTO: 0.6 THOUSAND/ΜL (ref 0.17–1.22)
MONOCYTES NFR BLD AUTO: 7 % (ref 4–12)
NEUTROPHILS # BLD AUTO: 5.33 THOUSANDS/ΜL (ref 1.85–7.62)
NEUTS SEG NFR BLD AUTO: 58 % (ref 43–75)
NITRITE UR QL STRIP: NEGATIVE
NON-SQ EPI CELLS URNS QL MICRO: NORMAL /HPF
NRBC BLD AUTO-RTO: 0 /100 WBCS
O2 CT BLDV-SCNC: 16.2 ML/DL
PCO2 BLDV: 22.6 MM HG (ref 42–50)
PH BLDV: 7.52 [PH] (ref 7.3–7.4)
PH UR STRIP.AUTO: 7 [PH] (ref 4.5–8)
PHOSPHATE SERPL-MCNC: 3.8 MG/DL (ref 2.7–4.5)
PLATELET # BLD AUTO: 255 THOUSANDS/UL (ref 149–390)
PMV BLD AUTO: 11.4 FL (ref 8.9–12.7)
PO2 BLDV: 142.5 MM HG (ref 35–45)
POTASSIUM SERPL-SCNC: 4.4 MMOL/L (ref 3.5–5.3)
PROT SERPL-MCNC: 7.1 G/DL (ref 6.4–8.2)
PROT UR STRIP-MCNC: NEGATIVE MG/DL
RBC # BLD AUTO: 4.15 MILLION/UL (ref 3.88–5.62)
RBC #/AREA URNS AUTO: NORMAL /HPF
SODIUM SERPL-SCNC: 123 MMOL/L (ref 136–145)
SP GR UR STRIP.AUTO: 1.01 (ref 1–1.03)
TROPONIN I SERPL-MCNC: <0.02 NG/ML
TSH SERPL DL<=0.05 MIU/L-ACNC: 18.7 UIU/ML (ref 0.36–3.74)
UROBILINOGEN UR QL STRIP.AUTO: 0.2 E.U./DL
WBC # BLD AUTO: 9.05 THOUSAND/UL (ref 4.31–10.16)
WBC #/AREA URNS AUTO: NORMAL /HPF

## 2020-05-14 PROCEDURE — 80053 COMPREHEN METABOLIC PANEL: CPT | Performed by: EMERGENCY MEDICINE

## 2020-05-14 PROCEDURE — 83735 ASSAY OF MAGNESIUM: CPT | Performed by: INTERNAL MEDICINE

## 2020-05-14 PROCEDURE — 82948 REAGENT STRIP/BLOOD GLUCOSE: CPT

## 2020-05-14 PROCEDURE — 82805 BLOOD GASES W/O2 SATURATION: CPT | Performed by: EMERGENCY MEDICINE

## 2020-05-14 PROCEDURE — 99285 EMERGENCY DEPT VISIT HI MDM: CPT

## 2020-05-14 PROCEDURE — 71046 X-RAY EXAM CHEST 2 VIEWS: CPT

## 2020-05-14 PROCEDURE — 93005 ELECTROCARDIOGRAM TRACING: CPT

## 2020-05-14 PROCEDURE — 84443 ASSAY THYROID STIM HORMONE: CPT | Performed by: INTERNAL MEDICINE

## 2020-05-14 PROCEDURE — NC001 PR NO CHARGE: Performed by: INTERNAL MEDICINE

## 2020-05-14 PROCEDURE — 83036 HEMOGLOBIN GLYCOSYLATED A1C: CPT | Performed by: INTERNAL MEDICINE

## 2020-05-14 PROCEDURE — 84100 ASSAY OF PHOSPHORUS: CPT | Performed by: INTERNAL MEDICINE

## 2020-05-14 PROCEDURE — 84484 ASSAY OF TROPONIN QUANT: CPT | Performed by: EMERGENCY MEDICINE

## 2020-05-14 PROCEDURE — 85025 COMPLETE CBC W/AUTO DIFF WBC: CPT | Performed by: EMERGENCY MEDICINE

## 2020-05-14 PROCEDURE — 82010 KETONE BODYS QUAN: CPT | Performed by: EMERGENCY MEDICINE

## 2020-05-14 PROCEDURE — 96360 HYDRATION IV INFUSION INIT: CPT

## 2020-05-14 PROCEDURE — 36415 COLL VENOUS BLD VENIPUNCTURE: CPT | Performed by: EMERGENCY MEDICINE

## 2020-05-14 PROCEDURE — 99285 EMERGENCY DEPT VISIT HI MDM: CPT | Performed by: EMERGENCY MEDICINE

## 2020-05-14 PROCEDURE — 81001 URINALYSIS AUTO W/SCOPE: CPT

## 2020-05-14 RX ORDER — HYDROXYZINE HYDROCHLORIDE 25 MG/1
25 TABLET, FILM COATED ORAL EVERY 6 HOURS PRN
Status: DISCONTINUED | OUTPATIENT
Start: 2020-05-14 | End: 2020-05-15 | Stop reason: HOSPADM

## 2020-05-14 RX ORDER — THIAMINE MONONITRATE (VIT B1) 100 MG
100 TABLET ORAL DAILY
Status: DISCONTINUED | OUTPATIENT
Start: 2020-05-15 | End: 2020-05-15 | Stop reason: HOSPADM

## 2020-05-14 RX ORDER — ONDANSETRON 2 MG/ML
4 INJECTION INTRAMUSCULAR; INTRAVENOUS EVERY 6 HOURS PRN
Status: DISCONTINUED | OUTPATIENT
Start: 2020-05-14 | End: 2020-05-15 | Stop reason: HOSPADM

## 2020-05-14 RX ORDER — FOLIC ACID 1 MG/1
1 TABLET ORAL DAILY
Status: DISCONTINUED | OUTPATIENT
Start: 2020-05-15 | End: 2020-05-15 | Stop reason: HOSPADM

## 2020-05-14 RX ORDER — CLONIDINE HYDROCHLORIDE 0.1 MG/1
0.1 TABLET ORAL EVERY 8 HOURS SCHEDULED
Status: DISCONTINUED | OUTPATIENT
Start: 2020-05-14 | End: 2020-05-15 | Stop reason: HOSPADM

## 2020-05-14 RX ORDER — NICOTINE 21 MG/24HR
1 PATCH, TRANSDERMAL 24 HOURS TRANSDERMAL DAILY
Status: DISCONTINUED | OUTPATIENT
Start: 2020-05-14 | End: 2020-05-15 | Stop reason: HOSPADM

## 2020-05-14 RX ORDER — ACETAMINOPHEN 325 MG/1
650 TABLET ORAL EVERY 6 HOURS PRN
Status: DISCONTINUED | OUTPATIENT
Start: 2020-05-14 | End: 2020-05-15 | Stop reason: HOSPADM

## 2020-05-14 RX ORDER — LEVOTHYROXINE SODIUM 0.07 MG/1
75 TABLET ORAL
Status: DISCONTINUED | OUTPATIENT
Start: 2020-05-15 | End: 2020-05-15 | Stop reason: HOSPADM

## 2020-05-14 RX ORDER — SODIUM CHLORIDE, SODIUM LACTATE, POTASSIUM CHLORIDE, CALCIUM CHLORIDE 600; 310; 30; 20 MG/100ML; MG/100ML; MG/100ML; MG/100ML
200 INJECTION, SOLUTION INTRAVENOUS CONTINUOUS
Status: DISCONTINUED | OUTPATIENT
Start: 2020-05-14 | End: 2020-05-15 | Stop reason: HOSPADM

## 2020-05-14 RX ADMIN — SODIUM CHLORIDE 1000 ML: 0.9 INJECTION, SOLUTION INTRAVENOUS at 14:19

## 2020-05-14 RX ADMIN — SODIUM CHLORIDE 1000 ML: 0.9 INJECTION, SOLUTION INTRAVENOUS at 14:48

## 2020-05-14 RX ADMIN — NICOTINE 1 PATCH: 21 PATCH, EXTENDED RELEASE TRANSDERMAL at 22:03

## 2020-05-14 RX ADMIN — SODIUM CHLORIDE, SODIUM LACTATE, POTASSIUM CHLORIDE, AND CALCIUM CHLORIDE 200 ML/HR: .6; .31; .03; .02 INJECTION, SOLUTION INTRAVENOUS at 20:00

## 2020-05-14 RX ADMIN — SODIUM CHLORIDE 15 UNITS/HR: 9 INJECTION, SOLUTION INTRAVENOUS at 16:12

## 2020-05-14 RX ADMIN — SODIUM CHLORIDE, SODIUM LACTATE, POTASSIUM CHLORIDE, AND CALCIUM CHLORIDE 200 ML/HR: .6; .31; .03; .02 INJECTION, SOLUTION INTRAVENOUS at 17:30

## 2020-05-15 VITALS
DIASTOLIC BLOOD PRESSURE: 76 MMHG | TEMPERATURE: 98.2 F | RESPIRATION RATE: 16 BRPM | BODY MASS INDEX: 20.79 KG/M2 | HEIGHT: 70 IN | WEIGHT: 145.2 LBS | SYSTOLIC BLOOD PRESSURE: 125 MMHG | HEART RATE: 67 BPM | OXYGEN SATURATION: 100 %

## 2020-05-15 PROBLEM — E11.01 HYPEROSMOLAR (NONKETOTIC) COMA (HCC): Status: RESOLVED | Noted: 2020-04-19 | Resolved: 2020-05-15

## 2020-05-15 PROBLEM — E87.1 HYPONATREMIA: Status: RESOLVED | Noted: 2020-04-19 | Resolved: 2020-05-15

## 2020-05-15 LAB
ANION GAP SERPL CALCULATED.3IONS-SCNC: 5 MMOL/L (ref 4–13)
ANION GAP SERPL CALCULATED.3IONS-SCNC: 7 MMOL/L (ref 4–13)
ATRIAL RATE: 70 BPM
BUN SERPL-MCNC: 19 MG/DL (ref 5–25)
BUN SERPL-MCNC: 22 MG/DL (ref 5–25)
CALCIUM SERPL-MCNC: 7.8 MG/DL (ref 8.3–10.1)
CALCIUM SERPL-MCNC: 8.1 MG/DL (ref 8.3–10.1)
CHLORIDE SERPL-SCNC: 105 MMOL/L (ref 100–108)
CHLORIDE SERPL-SCNC: 107 MMOL/L (ref 100–108)
CHOLEST SERPL-MCNC: 172 MG/DL (ref 50–200)
CO2 SERPL-SCNC: 27 MMOL/L (ref 21–32)
CO2 SERPL-SCNC: 27 MMOL/L (ref 21–32)
CREAT SERPL-MCNC: 0.76 MG/DL (ref 0.6–1.3)
CREAT SERPL-MCNC: 0.83 MG/DL (ref 0.6–1.3)
GFR SERPL CREATININE-BSD FRML MDRD: 110 ML/MIN/1.73SQ M
GFR SERPL CREATININE-BSD FRML MDRD: 114 ML/MIN/1.73SQ M
GLUCOSE SERPL-MCNC: 132 MG/DL (ref 65–140)
GLUCOSE SERPL-MCNC: 158 MG/DL (ref 65–140)
GLUCOSE SERPL-MCNC: 165 MG/DL (ref 65–140)
GLUCOSE SERPL-MCNC: 166 MG/DL (ref 65–140)
GLUCOSE SERPL-MCNC: 170 MG/DL (ref 65–140)
GLUCOSE SERPL-MCNC: 173 MG/DL (ref 65–140)
GLUCOSE SERPL-MCNC: 191 MG/DL (ref 65–140)
GLUCOSE SERPL-MCNC: 288 MG/DL (ref 65–140)
GLUCOSE SERPL-MCNC: 95 MG/DL (ref 65–140)
HDLC SERPL-MCNC: 82 MG/DL
LDLC SERPL CALC-MCNC: 69 MG/DL (ref 0–100)
NONHDLC SERPL-MCNC: 90 MG/DL
P AXIS: 74 DEGREES
POTASSIUM SERPL-SCNC: 3.2 MMOL/L (ref 3.5–5.3)
POTASSIUM SERPL-SCNC: 3.3 MMOL/L (ref 3.5–5.3)
PR INTERVAL: 174 MS
QRS AXIS: 88 DEGREES
QRSD INTERVAL: 94 MS
QT INTERVAL: 378 MS
QTC INTERVAL: 408 MS
SODIUM SERPL-SCNC: 139 MMOL/L (ref 136–145)
SODIUM SERPL-SCNC: 139 MMOL/L (ref 136–145)
T WAVE AXIS: 72 DEGREES
TRIGL SERPL-MCNC: 103 MG/DL
VENTRICULAR RATE: 70 BPM

## 2020-05-15 PROCEDURE — 99236 HOSP IP/OBS SAME DATE HI 85: CPT | Performed by: INTERNAL MEDICINE

## 2020-05-15 PROCEDURE — 82948 REAGENT STRIP/BLOOD GLUCOSE: CPT

## 2020-05-15 PROCEDURE — NC001 PR NO CHARGE: Performed by: INTERNAL MEDICINE

## 2020-05-15 PROCEDURE — 93010 ELECTROCARDIOGRAM REPORT: CPT | Performed by: INTERNAL MEDICINE

## 2020-05-15 PROCEDURE — 80061 LIPID PANEL: CPT | Performed by: INTERNAL MEDICINE

## 2020-05-15 PROCEDURE — 80048 BASIC METABOLIC PNL TOTAL CA: CPT | Performed by: INTERNAL MEDICINE

## 2020-05-15 PROCEDURE — 80048 BASIC METABOLIC PNL TOTAL CA: CPT | Performed by: STUDENT IN AN ORGANIZED HEALTH CARE EDUCATION/TRAINING PROGRAM

## 2020-05-15 RX ORDER — POTASSIUM CHLORIDE 20 MEQ/1
40 TABLET, EXTENDED RELEASE ORAL ONCE
Status: COMPLETED | OUTPATIENT
Start: 2020-05-15 | End: 2020-05-15

## 2020-05-15 RX ORDER — POTASSIUM CHLORIDE 20 MEQ/1
40 TABLET, EXTENDED RELEASE ORAL DAILY
Status: DISCONTINUED | OUTPATIENT
Start: 2020-05-15 | End: 2020-05-15 | Stop reason: HOSPADM

## 2020-05-15 RX ADMIN — POTASSIUM CHLORIDE 40 MEQ: 1500 TABLET, EXTENDED RELEASE ORAL at 09:48

## 2020-05-15 RX ADMIN — FOLIC ACID 1 MG: 1 TABLET ORAL at 09:48

## 2020-05-15 RX ADMIN — CLONIDINE HYDROCHLORIDE 0.1 MG: 0.1 TABLET ORAL at 06:00

## 2020-05-15 RX ADMIN — NICOTINE 1 PATCH: 21 PATCH, EXTENDED RELEASE TRANSDERMAL at 09:48

## 2020-05-15 RX ADMIN — SODIUM CHLORIDE, SODIUM LACTATE, POTASSIUM CHLORIDE, AND CALCIUM CHLORIDE 200 ML/HR: .6; .31; .03; .02 INJECTION, SOLUTION INTRAVENOUS at 01:11

## 2020-05-15 RX ADMIN — SODIUM CHLORIDE, SODIUM LACTATE, POTASSIUM CHLORIDE, AND CALCIUM CHLORIDE 200 ML/HR: .6; .31; .03; .02 INJECTION, SOLUTION INTRAVENOUS at 05:59

## 2020-05-15 RX ADMIN — THIAMINE HCL TAB 100 MG 100 MG: 100 TAB at 09:48

## 2020-05-15 RX ADMIN — Medication 1 TABLET: at 09:48

## 2020-05-15 RX ADMIN — LEVOTHYROXINE SODIUM 75 MCG: 75 TABLET ORAL at 06:00

## 2020-05-15 RX ADMIN — POTASSIUM CHLORIDE 40 MEQ: 1500 TABLET, EXTENDED RELEASE ORAL at 10:06

## 2020-05-20 LAB
EST. AVERAGE GLUCOSE BLD GHB EST-MCNC: >355 MG/DL
HBA1C MFR BLD: >14 %

## 2022-09-07 ENCOUNTER — APPOINTMENT (OUTPATIENT)
Dept: RADIOLOGY | Facility: HOSPITAL | Age: 42
DRG: 420 | End: 2022-09-07
Payer: COMMERCIAL

## 2022-09-07 ENCOUNTER — HOSPITAL ENCOUNTER (INPATIENT)
Facility: HOSPITAL | Age: 42
LOS: 5 days | Discharge: HOME | DRG: 420 | End: 2022-09-12
Attending: EMERGENCY MEDICINE | Admitting: INTERNAL MEDICINE
Payer: COMMERCIAL

## 2022-09-07 DIAGNOSIS — Z91.14 NONCOMPLIANCE WITH MEDICATIONS: ICD-10-CM

## 2022-09-07 DIAGNOSIS — R13.10 DYSPHAGIA: ICD-10-CM

## 2022-09-07 DIAGNOSIS — E03.9 HYPOTHYROIDISM: ICD-10-CM

## 2022-09-07 DIAGNOSIS — N17.9 AKI (ACUTE KIDNEY INJURY) (HCC): ICD-10-CM

## 2022-09-07 DIAGNOSIS — E83.39 HYPERPHOSPHATEMIA: ICD-10-CM

## 2022-09-07 DIAGNOSIS — E10.65 TYPE 1 DIABETES MELLITUS WITH HYPERGLYCEMIA (HCC): ICD-10-CM

## 2022-09-07 DIAGNOSIS — E11.10 DKA (DIABETIC KETOACIDOSIS) (HCC): Primary | ICD-10-CM

## 2022-09-07 PROBLEM — Z91.19 MEDICALLY NONCOMPLIANT: Status: ACTIVE | Noted: 2022-09-07

## 2022-09-07 PROBLEM — E87.2 METABOLIC ACIDOSIS: Status: ACTIVE | Noted: 2022-09-07

## 2022-09-07 PROBLEM — Z91.199 MEDICALLY NONCOMPLIANT: Status: ACTIVE | Noted: 2022-09-07

## 2022-09-07 PROBLEM — E87.20 METABOLIC ACIDOSIS: Status: ACTIVE | Noted: 2022-09-07

## 2022-09-07 LAB
ALBUMIN SERPL BCP-MCNC: 3.2 G/DL (ref 3.5–5)
ALP SERPL-CCNC: 216 U/L (ref 46–116)
ALT SERPL W P-5'-P-CCNC: 29 U/L (ref 12–78)
AST SERPL W P-5'-P-CCNC: 15 U/L (ref 5–45)
BASE EXCESS BLDA CALC-SCNC: -23.5 MMOL/L
BASE EXCESS BLDA CALC-SCNC: -30 MMOL/L (ref -2–3)
BASOPHILS # BLD AUTO: 0.03 THOUSANDS/ΜL (ref 0–0.1)
BASOPHILS NFR BLD AUTO: 0 % (ref 0–1)
BETA-HYDROXYBUTYRATE: 4.2 MMOL/L
BILIRUB SERPL-MCNC: 0.6 MG/DL (ref 0.2–1)
BUN SERPL-MCNC: 28 MG/DL (ref 5–25)
CA-I BLD-SCNC: 1.21 MMOL/L (ref 1.12–1.32)
CALCIUM ALBUM COR SERPL-MCNC: 9.4 MG/DL (ref 8.3–10.1)
CALCIUM SERPL-MCNC: 8.8 MG/DL (ref 8.3–10.1)
CHLORIDE SERPL-SCNC: 84 MMOL/L (ref 96–108)
CO2 SERPL-SCNC: <5 MMOL/L (ref 21–32)
CREAT SERPL-MCNC: 2.05 MG/DL (ref 0.6–1.3)
EOSINOPHIL # BLD AUTO: 0.01 THOUSAND/ΜL (ref 0–0.61)
EOSINOPHIL NFR BLD AUTO: 0 % (ref 0–6)
ERYTHROCYTE [DISTWIDTH] IN BLOOD BY AUTOMATED COUNT: 14.2 % (ref 11.6–15.1)
GFR SERPL CREATININE-BSD FRML MDRD: 38 ML/MIN/1.73SQ M
GLUCOSE SERPL-MCNC: 482 MG/DL (ref 65–140)
GLUCOSE SERPL-MCNC: 897 MG/DL (ref 65–140)
GLUCOSE SERPL-MCNC: >500 MG/DL (ref 65–140)
GLUCOSE SERPL-MCNC: >600 MG/DL (ref 65–140)
HCO3 BLDA-SCNC: 3.1 MMOL/L (ref 24–30)
HCO3 BLDA-SCNC: 4 MMOL/L (ref 22–28)
HCT VFR BLD AUTO: 47.4 % (ref 36.5–49.3)
HCT VFR BLD CALC: 49 % (ref 36.5–49.3)
HGB BLD-MCNC: 14.6 G/DL (ref 12–17)
HGB BLDA-MCNC: 16.7 G/DL (ref 12–17)
IMM GRANULOCYTES # BLD AUTO: 0.11 THOUSAND/UL (ref 0–0.2)
IMM GRANULOCYTES NFR BLD AUTO: 1 % (ref 0–2)
LIPASE SERPL-CCNC: 116 U/L (ref 73–393)
LYMPHOCYTES # BLD AUTO: 1.75 THOUSANDS/ΜL (ref 0.6–4.47)
LYMPHOCYTES NFR BLD AUTO: 11 % (ref 14–44)
MAGNESIUM SERPL-MCNC: 2.5 MG/DL (ref 1.6–2.6)
MCH RBC QN AUTO: 30.2 PG (ref 26.8–34.3)
MCHC RBC AUTO-ENTMCNC: 30.8 G/DL (ref 31.4–37.4)
MCV RBC AUTO: 91 FL (ref 82–98)
MONOCYTES # BLD AUTO: 0.83 THOUSAND/ΜL (ref 0.17–1.22)
MONOCYTES NFR BLD AUTO: 5 % (ref 4–12)
NEUTROPHILS # BLD AUTO: 13.04 THOUSANDS/ΜL (ref 1.85–7.62)
NEUTS SEG NFR BLD AUTO: 83 % (ref 43–75)
NRBC BLD AUTO-RTO: 0 /100 WBCS
O2 CT BLDA-SCNC: 18.5 ML/DL (ref 16–23)
OXYHGB MFR BLDA: 96.2 % (ref 94–97)
PCO2 BLD: 17.1 MM HG (ref 42–50)
PCO2 BLD: <5 MMOL/L (ref 21–32)
PCO2 BLDA: 13.4 MM HG (ref 36–44)
PH BLD: 6.86 [PH] (ref 7.3–7.4)
PH BLDA: 7.09 [PH] (ref 7.35–7.45)
PHOSPHATE SERPL-MCNC: 8.4 MG/DL (ref 2.7–4.5)
PLATELET # BLD AUTO: 468 THOUSANDS/UL (ref 149–390)
PMV BLD AUTO: 11.4 FL (ref 8.9–12.7)
PO2 BLD: 57 MM HG (ref 35–45)
PO2 BLDA: 99.8 MM HG (ref 75–129)
POTASSIUM BLD-SCNC: 4.1 MMOL/L (ref 3.5–5.3)
POTASSIUM SERPL-SCNC: 4.1 MMOL/L (ref 3.5–5.3)
PROT SERPL-MCNC: 8.7 G/DL (ref 6.4–8.4)
RBC # BLD AUTO: 4.83 MILLION/UL (ref 3.88–5.62)
SAO2 % BLD FROM PO2: 65 % (ref 60–85)
SARS-COV-2 RNA RESP QL NAA+PROBE: NEGATIVE
SODIUM BLD-SCNC: 124 MMOL/L (ref 136–145)
SODIUM SERPL-SCNC: 124 MMOL/L (ref 135–147)
SPECIMEN SOURCE: ABNORMAL
SPECIMEN SOURCE: ABNORMAL
TSH SERPL DL<=0.05 MIU/L-ACNC: 24.53 UIU/ML (ref 0.45–4.5)
WBC # BLD AUTO: 15.77 THOUSAND/UL (ref 4.31–10.16)

## 2022-09-07 PROCEDURE — 85014 HEMATOCRIT: CPT

## 2022-09-07 PROCEDURE — 96361 HYDRATE IV INFUSION ADD-ON: CPT

## 2022-09-07 PROCEDURE — 85025 COMPLETE CBC W/AUTO DIFF WBC: CPT | Performed by: EMERGENCY MEDICINE

## 2022-09-07 PROCEDURE — 84443 ASSAY THYROID STIM HORMONE: CPT | Performed by: EMERGENCY MEDICINE

## 2022-09-07 PROCEDURE — 84132 ASSAY OF SERUM POTASSIUM: CPT

## 2022-09-07 PROCEDURE — 99291 CRITICAL CARE FIRST HOUR: CPT | Performed by: EMERGENCY MEDICINE

## 2022-09-07 PROCEDURE — U0005 INFEC AGEN DETEC AMPLI PROBE: HCPCS | Performed by: EMERGENCY MEDICINE

## 2022-09-07 PROCEDURE — 82805 BLOOD GASES W/O2 SATURATION: CPT | Performed by: NURSE PRACTITIONER

## 2022-09-07 PROCEDURE — 93005 ELECTROCARDIOGRAM TRACING: CPT

## 2022-09-07 PROCEDURE — 82947 ASSAY GLUCOSE BLOOD QUANT: CPT

## 2022-09-07 PROCEDURE — 36000 PLACE NEEDLE IN VEIN: CPT | Performed by: EMERGENCY MEDICINE

## 2022-09-07 PROCEDURE — 82947 ASSAY GLUCOSE BLOOD QUANT: CPT | Performed by: NURSE PRACTITIONER

## 2022-09-07 PROCEDURE — 82330 ASSAY OF CALCIUM: CPT

## 2022-09-07 PROCEDURE — 80053 COMPREHEN METABOLIC PANEL: CPT | Performed by: EMERGENCY MEDICINE

## 2022-09-07 PROCEDURE — 82010 KETONE BODYS QUAN: CPT | Performed by: EMERGENCY MEDICINE

## 2022-09-07 PROCEDURE — 82948 REAGENT STRIP/BLOOD GLUCOSE: CPT

## 2022-09-07 PROCEDURE — 4A133J1 MONITORING OF ARTERIAL PULSE, PERIPHERAL, PERCUTANEOUS APPROACH: ICD-10-PCS | Performed by: INTERNAL MEDICINE

## 2022-09-07 PROCEDURE — 82803 BLOOD GASES ANY COMBINATION: CPT

## 2022-09-07 PROCEDURE — 36415 COLL VENOUS BLD VENIPUNCTURE: CPT | Performed by: EMERGENCY MEDICINE

## 2022-09-07 PROCEDURE — 80048 BASIC METABOLIC PNL TOTAL CA: CPT | Performed by: NURSE PRACTITIONER

## 2022-09-07 PROCEDURE — 83735 ASSAY OF MAGNESIUM: CPT | Performed by: NURSE PRACTITIONER

## 2022-09-07 PROCEDURE — 71045 X-RAY EXAM CHEST 1 VIEW: CPT

## 2022-09-07 PROCEDURE — 99285 EMERGENCY DEPT VISIT HI MDM: CPT

## 2022-09-07 PROCEDURE — 4A133B1 MONITORING OF ARTERIAL PRESSURE, PERIPHERAL, PERCUTANEOUS APPROACH: ICD-10-PCS | Performed by: INTERNAL MEDICINE

## 2022-09-07 PROCEDURE — 96365 THER/PROPH/DIAG IV INF INIT: CPT

## 2022-09-07 PROCEDURE — NC001 PR NO CHARGE: Performed by: NURSE PRACTITIONER

## 2022-09-07 PROCEDURE — 83036 HEMOGLOBIN GLYCOSYLATED A1C: CPT | Performed by: NURSE PRACTITIONER

## 2022-09-07 PROCEDURE — U0003 INFECTIOUS AGENT DETECTION BY NUCLEIC ACID (DNA OR RNA); SEVERE ACUTE RESPIRATORY SYNDROME CORONAVIRUS 2 (SARS-COV-2) (CORONAVIRUS DISEASE [COVID-19]), AMPLIFIED PROBE TECHNIQUE, MAKING USE OF HIGH THROUGHPUT TECHNOLOGIES AS DESCRIBED BY CMS-2020-01-R: HCPCS | Performed by: EMERGENCY MEDICINE

## 2022-09-07 PROCEDURE — 03HY32Z INSERTION OF MONITORING DEVICE INTO UPPER ARTERY, PERCUTANEOUS APPROACH: ICD-10-PCS | Performed by: INTERNAL MEDICINE

## 2022-09-07 PROCEDURE — 96375 TX/PRO/DX INJ NEW DRUG ADDON: CPT

## 2022-09-07 PROCEDURE — 76942 ECHO GUIDE FOR BIOPSY: CPT | Performed by: EMERGENCY MEDICINE

## 2022-09-07 PROCEDURE — 84439 ASSAY OF FREE THYROXINE: CPT | Performed by: EMERGENCY MEDICINE

## 2022-09-07 PROCEDURE — 36620 INSERTION CATHETER ARTERY: CPT | Performed by: NURSE PRACTITIONER

## 2022-09-07 PROCEDURE — 84295 ASSAY OF SERUM SODIUM: CPT

## 2022-09-07 PROCEDURE — 84100 ASSAY OF PHOSPHORUS: CPT | Performed by: NURSE PRACTITIONER

## 2022-09-07 PROCEDURE — 83735 ASSAY OF MAGNESIUM: CPT | Performed by: EMERGENCY MEDICINE

## 2022-09-07 PROCEDURE — 84100 ASSAY OF PHOSPHORUS: CPT | Performed by: EMERGENCY MEDICINE

## 2022-09-07 PROCEDURE — 83690 ASSAY OF LIPASE: CPT | Performed by: EMERGENCY MEDICINE

## 2022-09-07 RX ORDER — SODIUM CHLORIDE 9 MG/ML
1000 INJECTION, SOLUTION INTRAVENOUS ONCE
Status: COMPLETED | OUTPATIENT
Start: 2022-09-07 | End: 2022-09-07

## 2022-09-07 RX ORDER — HEPARIN SODIUM 5000 [USP'U]/ML
5000 INJECTION, SOLUTION INTRAVENOUS; SUBCUTANEOUS EVERY 8 HOURS SCHEDULED
Status: DISCONTINUED | OUTPATIENT
Start: 2022-09-07 | End: 2022-09-12 | Stop reason: HOSPADM

## 2022-09-07 RX ORDER — DEXTROSE, SODIUM CHLORIDE, SODIUM LACTATE, POTASSIUM CHLORIDE, AND CALCIUM CHLORIDE 5; .6; .31; .03; .02 G/100ML; G/100ML; G/100ML; G/100ML; G/100ML
250 INJECTION, SOLUTION INTRAVENOUS CONTINUOUS
Status: DISCONTINUED | OUTPATIENT
Start: 2022-09-07 | End: 2022-09-08

## 2022-09-07 RX ORDER — ONDANSETRON 2 MG/ML
4 INJECTION INTRAMUSCULAR; INTRAVENOUS ONCE
Status: COMPLETED | OUTPATIENT
Start: 2022-09-07 | End: 2022-09-07

## 2022-09-07 RX ORDER — POTASSIUM CHLORIDE 14.9 MG/ML
20 INJECTION INTRAVENOUS ONCE
Status: COMPLETED | OUTPATIENT
Start: 2022-09-07 | End: 2022-09-07

## 2022-09-07 RX ORDER — SODIUM CHLORIDE, SODIUM GLUCONATE, SODIUM ACETATE, POTASSIUM CHLORIDE, MAGNESIUM CHLORIDE, SODIUM PHOSPHATE, DIBASIC, AND POTASSIUM PHOSPHATE .53; .5; .37; .037; .03; .012; .00082 G/100ML; G/100ML; G/100ML; G/100ML; G/100ML; G/100ML; G/100ML
500 INJECTION, SOLUTION INTRAVENOUS CONTINUOUS
Status: DISCONTINUED | OUTPATIENT
Start: 2022-09-07 | End: 2022-09-08

## 2022-09-07 RX ORDER — LEVOTHYROXINE SODIUM 0.07 MG/1
75 TABLET ORAL
Status: DISCONTINUED | OUTPATIENT
Start: 2022-09-08 | End: 2022-09-12 | Stop reason: HOSPADM

## 2022-09-07 RX ORDER — SODIUM CHLORIDE, SODIUM GLUCONATE, SODIUM ACETATE, POTASSIUM CHLORIDE, MAGNESIUM CHLORIDE, SODIUM PHOSPHATE, DIBASIC, AND POTASSIUM PHOSPHATE .53; .5; .37; .037; .03; .012; .00082 G/100ML; G/100ML; G/100ML; G/100ML; G/100ML; G/100ML; G/100ML
250 INJECTION, SOLUTION INTRAVENOUS CONTINUOUS
Status: DISCONTINUED | OUTPATIENT
Start: 2022-09-08 | End: 2022-09-08

## 2022-09-07 RX ORDER — SODIUM CHLORIDE 9 MG/ML
250 INJECTION, SOLUTION INTRAVENOUS CONTINUOUS
Status: DISPENSED | OUTPATIENT
Start: 2022-09-08 | End: 2022-09-08

## 2022-09-07 RX ORDER — SODIUM CHLORIDE, SODIUM GLUCONATE, SODIUM ACETATE, POTASSIUM CHLORIDE, MAGNESIUM CHLORIDE, SODIUM PHOSPHATE, DIBASIC, AND POTASSIUM PHOSPHATE .53; .5; .37; .037; .03; .012; .00082 G/100ML; G/100ML; G/100ML; G/100ML; G/100ML; G/100ML; G/100ML
1000 INJECTION, SOLUTION INTRAVENOUS ONCE
Status: COMPLETED | OUTPATIENT
Start: 2022-09-07 | End: 2022-09-07

## 2022-09-07 RX ORDER — SODIUM CHLORIDE 9 MG/ML
500 INJECTION, SOLUTION INTRAVENOUS CONTINUOUS
Status: DISPENSED | OUTPATIENT
Start: 2022-09-07 | End: 2022-09-08

## 2022-09-07 RX ORDER — NICOTINE 21 MG/24HR
21 PATCH, TRANSDERMAL 24 HOURS TRANSDERMAL DAILY
Status: DISCONTINUED | OUTPATIENT
Start: 2022-09-08 | End: 2022-09-12 | Stop reason: HOSPADM

## 2022-09-07 RX ORDER — LIDOCAINE HYDROCHLORIDE 20 MG/ML
5 INJECTION, SOLUTION EPIDURAL; INFILTRATION; INTRACAUDAL; PERINEURAL ONCE
Status: COMPLETED | OUTPATIENT
Start: 2022-09-07 | End: 2022-09-07

## 2022-09-07 RX ORDER — LIDOCAINE HYDROCHLORIDE 20 MG/ML
INJECTION, SOLUTION EPIDURAL; INFILTRATION; INTRACAUDAL; PERINEURAL
Status: COMPLETED
Start: 2022-09-07 | End: 2022-09-07

## 2022-09-07 RX ADMIN — SODIUM CHLORIDE 500 ML/HR: 0.9 INJECTION, SOLUTION INTRAVENOUS at 22:14

## 2022-09-07 RX ADMIN — LIDOCAINE HYDROCHLORIDE 5 ML: 20 INJECTION, SOLUTION EPIDURAL; INFILTRATION; INTRACAUDAL; PERINEURAL at 23:37

## 2022-09-07 RX ADMIN — SODIUM CHLORIDE, SODIUM GLUCONATE, SODIUM ACETATE, POTASSIUM CHLORIDE, MAGNESIUM CHLORIDE, SODIUM PHOSPHATE, DIBASIC, AND POTASSIUM PHOSPHATE 500 ML/HR: .53; .5; .37; .037; .03; .012; .00082 INJECTION, SOLUTION INTRAVENOUS at 23:39

## 2022-09-07 RX ADMIN — HEPARIN SODIUM 5000 UNITS: 5000 INJECTION INTRAVENOUS; SUBCUTANEOUS at 22:53

## 2022-09-07 RX ADMIN — SODIUM CHLORIDE 1000 ML: 0.9 INJECTION, SOLUTION INTRAVENOUS at 20:12

## 2022-09-07 RX ADMIN — SODIUM CHLORIDE, SODIUM GLUCONATE, SODIUM ACETATE, POTASSIUM CHLORIDE, MAGNESIUM CHLORIDE, SODIUM PHOSPHATE, DIBASIC, AND POTASSIUM PHOSPHATE 1000 ML: .53; .5; .37; .037; .03; .012; .00082 INJECTION, SOLUTION INTRAVENOUS at 22:41

## 2022-09-07 RX ADMIN — ONDANSETRON 4 MG: 2 INJECTION INTRAMUSCULAR; INTRAVENOUS at 20:12

## 2022-09-07 RX ADMIN — SODIUM BICARBONATE 25 MEQ: 84 INJECTION, SOLUTION INTRAVENOUS at 21:34

## 2022-09-07 RX ADMIN — INSULIN HUMAN 7 UNITS: 100 INJECTION, SOLUTION PARENTERAL at 21:33

## 2022-09-07 RX ADMIN — SODIUM CHLORIDE 7.2 UNITS/HR: 9 INJECTION, SOLUTION INTRAVENOUS at 21:10

## 2022-09-07 RX ADMIN — SODIUM CHLORIDE 1000 ML/HR: 0.9 INJECTION, SOLUTION INTRAVENOUS at 20:58

## 2022-09-07 RX ADMIN — POTASSIUM CHLORIDE 20 MEQ: 14.9 INJECTION, SOLUTION INTRAVENOUS at 20:59

## 2022-09-07 NOTE — ED PROVIDER NOTES
History  Chief Complaint   Patient presents with    Hyperglycemia - Symptomatic     Type 2 diabteic Pt c/o of nausea vomiting x 3 days  Pt sugars greater than 500  Pt not taking his insulin   Weakness - Generalized     36year old male with history of insulin dependent diabetes mellitus and hypothyroidism presents for evaluation of abdominal pain, nausea and vomiting for the past 3 days  Patient states he has not given himself insulin in at least 1 week and has not been checking his blood glucose levels as he does not have a glucometer  He states he has not taken his levothyroxine in more than a month as he had run out of this medication  History of HHNK in the past  History of polysubstance abuse  States he is only using cannabis currently  History provided by:  Patient  Vomiting  Severity:  Severe  Duration:  3 days  Timing:  Constant  Quality:  Bilious material  Progression:  Worsening  Chronicity:  New  Recent urination:  Decreased  Relieved by:  Nothing  Worsened by:  Nothing  Ineffective treatments:  None tried  Associated symptoms: abdominal pain, chills and cough    Associated symptoms: no diarrhea, no fever, no headaches, no myalgias and no sore throat    Risk factors: diabetes        Prior to Admission Medications   Prescriptions Last Dose Informant Patient Reported? Taking?   insulin NPH (HumuLIN N,NovoLIN N) 100 Units/mL subcutaneous injection Unknown at Unknown time  No No   Sig: Inject 24 units under the skin daily before breakfast and 18 units before dinner   levothyroxine 75 mcg tablet Past Month at Unknown time  Yes Yes   Sig: Take 75 mcg by mouth daily      Facility-Administered Medications: None       Past Medical History:   Diagnosis Date    Diabetes mellitus ([facility] [address] )     Disease of thyroid gland        History reviewed  No pertinent surgical history  History reviewed  No pertinent family history  I have reviewed and agree with the history as documented      E-Cigarette/Vaping    E-Cigarette Use Never User      E-Cigarette/Vaping Substances    Nicotine No     Flavoring No      Social History     Tobacco Use    Smoking status: Current Every Day Smoker     Packs/day: 1 00     Years: 20 00     Pack years: 20 00     Types: Cigarettes    Smokeless tobacco: Never Used   Vaping Use    Vaping Use: Never used   Substance Use Topics    Alcohol use: Not Currently     Comment: ocassionally    Drug use: Yes     Types: Methamphetamines, Heroin     Comment: daily, meth last used 1 week ago, heroin last use 5/14       Review of Systems   Constitutional: Positive for chills and fatigue  Negative for diaphoresis and fever  HENT: Negative for congestion and sore throat  Respiratory: Positive for cough and shortness of breath  Cardiovascular: Negative for chest pain  Gastrointestinal: Positive for abdominal pain, nausea and vomiting  Negative for constipation and diarrhea  Musculoskeletal: Negative for myalgias  Skin: Negative for rash and wound  Neurological: Negative for dizziness, syncope and headaches  All other systems reviewed and are negative  Physical Exam  Physical Exam  Vitals and nursing note reviewed  Constitutional:       General: He is not in acute distress  Appearance: He is well-developed  He is not toxic-appearing or diaphoretic  HENT:      Head: Normocephalic and atraumatic  Right Ear: External ear normal       Left Ear: External ear normal       Nose: Nose normal       Mouth/Throat:      Mouth: Mucous membranes are dry  Eyes:      General: No scleral icterus  Cardiovascular:      Rate and Rhythm: Regular rhythm  Tachycardia present  Heart sounds: Normal heart sounds  Pulmonary:      Effort: Pulmonary effort is normal  Tachypnea present  No respiratory distress  Breath sounds: Normal breath sounds  Abdominal:      General: There is no distension  Tenderness: There is generalized abdominal tenderness   There is no guarding or rebound  Musculoskeletal:         General: No deformity  Normal range of motion  Skin:     Findings: No rash  Neurological:      General: No focal deficit present  Mental Status: He is alert and oriented to person, place, and time     Psychiatric:         Mood and Affect: Mood normal          Vital Signs  ED Triage Vitals   Temperature Pulse Respirations Blood Pressure SpO2   09/07/22 2017 09/07/22 2009 09/07/22 2009 09/07/22 2009 09/07/22 2009   (!) 96 9 °F (36 1 °C) 93 (!) 34 170/88 100 %      Temp Source Heart Rate Source Patient Position - Orthostatic VS BP Location FiO2 (%)   09/07/22 2017 09/07/22 2009 09/07/22 2009 09/07/22 2009 --   Temporal Monitor Lying Left arm       Pain Score       09/07/22 2200       No Pain           Vitals:    09/09/22 1557 09/09/22 1915 09/10/22 0709 09/10/22 1710   BP: 124/72 133/85 119/82 133/90   Pulse: 76 75 63    Patient Position - Orthostatic VS: Lying Lying Lying          Visual Acuity  Visual Acuity    Flowsheet Row Most Recent Value   L Pupil Size (mm) 3   R Pupil Size (mm) 3   L Pupil Shape Round   R Pupil Shape Round          ED Medications  Medications   sodium chloride 0 9 % infusion (0 mL/hr Intravenous Stopped 9/7/22 2257)     Followed by   sodium chloride 0 9 % infusion (0 mL/hr Intravenous Stopped 9/7/22 2240)     Followed by   sodium chloride 0 9 % infusion (250 mL/hr Intravenous Not Given 9/8/22 0143)   heparin (porcine) subcutaneous injection 5,000 Units (5,000 Units Subcutaneous Given 9/10/22 1353)   nicotine (NICODERM CQ) 21 mg/24 hr TD 24 hr patch 21 mg (21 mg Transdermal Medication Applied 9/10/22 0844)   levothyroxine tablet 75 mcg (75 mcg Oral Given 9/10/22 0555)   sodium chloride 0 9 % infusion (10 mL/hr Intravenous New Bag 9/8/22 1759)   senna (SENOKOT) tablet 8 6 mg (8 6 mg Oral Not Given 9/9/22 2108)   polyethylene glycol (MIRALAX) packet 17 g (17 g Oral Given 9/10/22 0843)   bisacodyl (DULCOLAX) rectal suppository 10 mg (has no administration in time range)   insulin glargine (LANTUS) subcutaneous injection 25 Units 0 25 mL ( Subcutaneous Dose Auto Held 9/15/22 2200)   insulin lispro (HumaLOG) 100 units/mL subcutaneous injection 5 Units ( Subcutaneous Dose Auto Held 9/13/22 0730)   insulin lispro (HumaLOG) 100 units/mL subcutaneous injection 1-6 Units ( Subcutaneous Dose Auto Held 9/13/22 1600)   insulin lispro (HumaLOG) 100 units/mL subcutaneous injection 1-6 Units ( Subcutaneous Dose Auto Held 9/13/22 2200)   insulin lispro (HumaLOG) 100 units/mL subcutaneous injection 5 Units ( Subcutaneous Dose Auto Held 9/13/22 1630)   insulin lispro (HumaLOG) 100 units/mL subcutaneous injection 5 Units ( Subcutaneous Dose Auto Held 9/13/22 1200)   acetaminophen (TYLENOL) tablet 650 mg ( Oral MAR Hold 9/10/22 1705)   pantoprazole (PROTONIX) EC tablet 40 mg ( Oral Dose Auto Held 9/13/22 0600)   sodium chloride 0 9 % bolus 1,000 mL (0 mL Intravenous Stopped 9/7/22 2059)   ondansetron (ZOFRAN) injection 4 mg (4 mg Intravenous Given 9/7/22 2012)   potassium chloride 20 mEq IVPB (premix) (0 mEq Intravenous Stopped 9/7/22 2248)   insulin regular (HumuLIN R,NovoLIN R) injection 7 Units (7 Units Intravenous Given 9/7/22 2133)   sodium bicarbonate 8 4 % injection 25 mEq (25 mEq Intravenous Given 9/7/22 2134)   multi-electrolyte (PLASMALYTE-A/ISOLYTE-S PH 7 4) IV solution 1,000 mL (0 mL Intravenous Stopped 9/7/22 2347)   lidocaine (PF) (XYLOCAINE-MPF) 2 % injection 5 mL (5 mL Infiltration Given 9/7/22 2337)   potassium chloride 20 mEq IVPB (premix) (0 mEq Intravenous Stopped 9/8/22 0432)   magnesium sulfate 2 g/50 mL IVPB (premix) 2 g (0 g Intravenous Stopped 9/8/22 0200)   potassium chloride 20 mEq IVPB (premix) (0 mEq Intravenous Stopped 9/8/22 0800)   potassium phosphates 30 mmol in sodium chloride 0 9 % 250 mL IVPB ( Intravenous Stopped 9/8/22 2000)   potassium chloride 20 mEq IVPB (premix) (0 mEq Intravenous Stopped 9/8/22 1232)   potassium chloride oral solution 40 mEq (40 mEq Oral Given 9/8/22 0928)   magnesium sulfate 4 g/100 mL IVPB (premix) 4 g (0 g Intravenous Stopped 9/8/22 1800)   calcium gluconate 2 g in sodium chloride 0 9% 100 mL (premix) (0 g Intravenous Stopped 9/8/22 1600)   potassium chloride (K-DUR,KLOR-CON) CR tablet 40 mEq (40 mEq Oral Given 9/8/22 1358)   potassium chloride 20 mEq IVPB (premix) (20 mEq Intravenous New Bag 9/8/22 1534)   multi-electrolyte (ISOLYTE-S PH 7 4) bolus 1,000 mL (0 mL Intravenous Stopped 9/9/22 0000)   potassium phosphates 30 mmol in sodium chloride 0 9 % 250 mL infusion (0 mmol Intravenous Stopped 9/9/22 0400)   potassium chloride (K-DUR,KLOR-CON) CR tablet 40 mEq (40 mEq Oral Given 9/9/22 0618)   potassium phosphates 30 mmol in sodium chloride 0 9 % 250 mL IVPB ( Intravenous New Bag 9/9/22 0617)   magnesium sulfate 2 g/50 mL IVPB (premix) 2 g (0 g Intravenous Stopped 9/10/22 0504)       Diagnostic Studies  Results Reviewed     Procedure Component Value Units Date/Time    Hemoglobin A1C w/ EAG Estimation [[id number]]  (Abnormal) Collected: 09/07/22 2004    Lab Status: Final result Specimen: Blood from Arm, Right Updated: 09/08/22 0809     Hemoglobin A1C 12 6 %       mg/dl     T4, free [[id number]]  (Abnormal) Collected: 09/07/22 2004    Lab Status: Final result Specimen: Blood from Arm, Right Updated: 09/08/22 0614     Free T4 0 49 ng/dL     Glucose, random [[id number]]  (Abnormal) Collected: 09/07/22 2329    Lab Status: Final result Specimen: Blood from Line, Arterial Updated: 09/07/22 2349     Glucose 482 mg/dL     Fingerstick Glucose (POCT) [[id number]]  (Abnormal) Collected: 09/07/22 2202    Lab Status: Final result Updated: 09/07/22 2205     POC Glucose >500 mg/dl     CBC and differential [[id number]]  (Abnormal) Collected: 09/07/22 2004    Lab Status: Edited Result - FINAL Specimen: Blood from Arm, Right Updated: 09/07/22 2136     WBC 15 77 Thousand/uL      RBC 4 83 Million/uL      Hemoglobin 14 6 g/dL      Hematocrit 47 4 % MCV 91 fL      MCH 30 2 pg      MCHC 30 8 g/dL      RDW 14 2 %      MPV 11 4 fL      Platelets 821 Thousands/uL      nRBC 0 /100 WBCs      Neutrophils Relative 83 %      Immat GRANS % 1 %      Lymphocytes Relative 11 %      Monocytes Relative 5 %      Eosinophils Relative 0 %      Basophils Relative 0 %      Neutrophils Absolute 13 04 Thousands/µL      Immature Grans Absolute 0 11 Thousand/uL      Lymphocytes Absolute 1 75 Thousands/µL      Monocytes Absolute 0 83 Thousand/µL      Eosinophils Absolute 0 01 Thousand/µL      Basophils Absolute 0 03 Thousands/µL     Narrative:      Communicated to Unruly Saab  Glucose result 897    Comprehensive metabolic panel [[id number]]  (Abnormal) Collected: 09/07/22 2004    Lab Status: Final result Specimen: Blood from Arm, Right Updated: 09/07/22 2113     Sodium 124 mmol/L      Potassium 4 1 mmol/L      Chloride 84 mmol/L      CO2 <5 mmol/L      ANION GAP --     BUN 28 mg/dL      Creatinine 2 05 mg/dL      Glucose 897 mg/dL      Calcium 8 8 mg/dL      Corrected Calcium 9 4 mg/dL      AST 15 U/L      ALT 29 U/L      Alkaline Phosphatase 216 U/L      Total Protein 8 7 g/dL      Albumin 3 2 g/dL      Total Bilirubin 0 60 mg/dL      eGFR 38 ml/min/1 73sq m     Narrative:      [place] guidelines for Chronic Kidney Disease (CKD):     Stage 1 with normal or high GFR (GFR > 90 mL/min/1 73 square meters)    Stage 2 Mild CKD (GFR = 60-89 mL/min/1 73 square meters)    Stage 3A Moderate CKD (GFR = 45-59 mL/min/1 73 square meters)    Stage 3B Moderate CKD (GFR = 30-44 mL/min/1 73 square meters)    Stage 4 Severe CKD (GFR = 15-29 mL/min/1 73 square meters)    Stage 5 End Stage CKD (GFR <15 mL/min/1 73 square meters)  Note: GFR calculation is accurate only with a steady state creatinine    Lipase [[id number]]  (Normal) Collected: 09/07/22 2004    Lab Status: Final result Specimen: Blood from Arm, Right Updated: 09/07/22 2111     Lipase 116 u/L     TSH, 3rd generation with Free T4 reflex [[id number]]  (Abnormal) Collected: 09/07/22 2004    Lab Status: Final result Specimen: Blood from Arm, Right Updated: 09/07/22 2111     TSH 3RD GENERATON 24 528 uIU/mL     Narrative:      Patients undergoing fluorescein dye angiography may retain small amounts of fluorescein in the body for 48-72 hours post procedure  Samples containing fluorescein can produce falsely depressed TSH values  If the patient had this procedure,a specimen should be resubmitted post fluorescein clearance  Fingerstick Glucose (POCT) [[id number]]  (Abnormal) Collected: 09/07/22 2103    Lab Status: Final result Updated: 09/07/22 2105     POC Glucose >500 mg/dl     COVID only [[id number]]  (Normal) Collected: 09/07/22 2004    Lab Status: Final result Specimen: Nares from Nose Updated: 09/07/22 2104     SARS-CoV-2 Negative    Narrative:      FOR PEDIATRIC PATIENTS - copy/paste COVID Guidelines URL to browser: [email or web address]    SARS-CoV-2 assay is a Nucleic Acid Amplification assay intended for the  qualitative detection of nucleic acid from SARS-CoV-2 in nasopharyngeal  swabs  Results are for the presumptive identification of SARS-CoV-2 RNA  Positive results are indicative of infection with SARS-CoV-2, the virus  causing COVID-19, but do not rule out bacterial infection or co-infection  with other viruses  Laboratories within the [place] and its  territories are required to report all positive results to the appropriate  public health authorities  Negative results do not preclude SARS-CoV-2  infection and should not be used as the sole basis for treatment or other  patient management decisions  Negative results must be combined with  clinical observations, patient history, and epidemiological information  This test has not been FDA cleared or approved  This test has been authorized by FDA under an Emergency Use Authorization  (EUA)   This test is only authorized for the duration of time the  declaration that circumstances exist justifying the authorization of the  emergency use of an in vitro diagnostic tests for detection of SARS-CoV-2  virus and/or diagnosis of COVID-19 infection under section 564(b)(1) of  the Act, 21 U  S C  145SGY-5(E)(5), unless the authorization is terminated  or revoked sooner  The test has been validated but independent review by FDA  and CLIA is pending  Test performed using [employer] GeneXpert: This RT-PCR assay targets N2,  a region unique to SARS-CoV-2  A conserved region in the E-gene was chosen  for pan-Sarbecovirus detection which includes SARS-CoV-2      Phosphorus [[id number]]  (Abnormal) Collected: 09/07/22 2004    Lab Status: Final result Specimen: Blood from Arm, Right Updated: 09/07/22 2029     Phosphorus 8 4 mg/dL     Magnesium [[id number]]  (Normal) Collected: 09/07/22 2004    Lab Status: Final result Specimen: Blood from Arm, Right Updated: 09/07/22 2029     Magnesium 2 5 mg/dL     Beta Hydroxybutyrate [[id number]]  (Abnormal) Collected: 09/07/22 2005    Lab Status: Final result Specimen: Blood from Arm, Right Updated: 09/07/22 2023     BETA-HYDROXYBUTYRATE 4 2 mmol/L     POCT Blood Gas (CG8+) [[id number]]  (Abnormal) Collected: 09/07/22 2007    Lab Status: Final result Specimen: Venous Updated: 09/07/22 2011     ph, [place]ido ISTAT 6 863     pCO2, [place] i-STAT 17 1 mm HG      pO2, [place] i-STAT 57 0 mm HG      BE, i-STAT -30 mmol/L      HCO3, [place] i-STAT 3 1 mmol/L      CO2, i-STAT <5 mmol/L      O2 Sat, i-STAT 65 %      SODIUM, I-STAT 124 mmol/l      Potassium, i-STAT 4 1 mmol/L      Calcium, Ionized i-STAT 1 21 mmol/L      Hct, i-STAT 49 %      Hgb, i-STAT 16 7 g/dl      Glucose, i-STAT >600 mg/dl      Specimen Type VENOUS    Fingerstick Glucose (POCT) [[id number]]  (Abnormal) Collected: 09/07/22 1952    Lab Status: Final result Updated: 09/07/22 1955     POC Glucose >500 mg/dl                  XR chest 1 view portable   ED Interpretation by Thania Rios MD (09/07 2030)   No acute pulmonary pathology      Final Result by Pippa Gerber MD (09/08 1665)      No acute cardiopulmonary disease                    Workstation performed: [id number]         FL barium swallow ROUTINE esophagus    (Results Pending)              Procedures  CriticalCare Time  Performed by: Thania Rios MD  Authorized by: Thania Rios MD     Critical care provider statement:     Critical care time (minutes):  41    Critical care time was exclusive of:  Separately billable procedures and treating other patients and teaching time    Critical care was necessary to treat or prevent imminent or life-threatening deterioration of the following conditions:  Dehydration and endocrine crisis    Critical care was time spent personally by me on the following activities:  Obtaining history from patient or surrogate, evaluation of patient's response to treatment, examination of patient, blood draw for specimens, ordering and performing treatments and interventions, ordering and review of laboratory studies, ordering and review of radiographic studies and re-evaluation of patient's condition    ECG 12 Lead Documentation Only    Date/Time: 9/7/2022 8:23 PM  Performed by: Thania Rios MD  Authorized by: Thania Rios MD     Indications / Diagnosis:  Shortness of breath  ECG reviewed by me, the ED Provider: yes    Patient location:  ED  Previous ECG:     Previous ECG:  Compared to current    Comparison ECG info:  5/14/20 normal sinus rhythm with twave inversion in v2 and avl    Similarity:  Changes noted (twaves no longer inverted in v2 and avl)  Interpretation:     Interpretation: non-specific    Quality:     Tracing quality:  Limited by artifact  Rate:     ECG rate:  92    ECG rate assessment: normal    Rhythm:     Rhythm: sinus rhythm    Ectopy:     Ectopy: none    QRS:     QRS axis:  Normal    QRS intervals: Normal  Conduction:     Conduction: normal    ST segments:     ST segments:  Normal  T waves:     T waves: normal           Procedure Note - Ultrasound Guided Peripheral IV    Ultrasound dynamic guidance was used for peripheral line insertion  Risks and benefits of the procedure were discussed with the patient  Discussed risks included pain with the procedure, bleeding, and risk of infection  Indication: Difficult non-ultrasound guided peripheral intravenous line insertion  Location: Laterally: right upper extremity  Procedure: The patient was prepped using standard ultrasound guided IV procedures  Using direct visualization of the intravenous catheter/needle, the vessel was successfully cannulated with return of blood and advancement of the catheter  The catheter was secured in the standard technique  Complications: None  Interpretation: Successful ultrasound guided peripheral IV  This is a billable ultrasound guided procedure  Ultrasound images stored on the [employer] ultrasound image , or as an attachment to this chart  ED Course  ED Course as of 09/10/22 1725   Wed Sep 07, 2022   2040 Called lab  CMP being rerun with dilution  Potassium 4 1  No hemolysis  Insulin infusion ordered  20 mEq IV potassium piggyback ordered  2057 Nausea improved  BP improved to 158/92   2112 TSH 3RD GENERATON(!): 24 528  18 7 two years ago   2113 Sodium(!): 124  143 when corrected for hyperglycemia using Luis M conversion formula   2115 Creatinine(!): 2 05  0 76 two years ago   2115 Alkaline Phosphatase(!): 216  205 two years ago                            Initial Sepsis Screening     Row Name 09/07/22 2009                Is the patient's history suggestive of a new or worsening infection?  No  -EE        Suspected source of infection --        Are two or more of the following signs & symptoms of infection both present and new to the patient? --        Indicate SIRS criteria --        If the answer is yes to both questions, suspicion of sepsis is present --        If severe sepsis is present AND tissue hypoperfusion perists in the hour after fluid resuscitation or lactate > 4, the patient meets criteria for SEPTIC SHOCK --        Are any of the following organ dysfunction criteria present within 6 hours of suspected infection and SIRS criteria that are NOT considered to be chronic conditions? --        Organ dysfunction --        Date of presentation of severe sepsis --        Time of presentation of severe sepsis --        Tissue hypoperfusion persists in the hour after crystalloid fluid administration, evidenced, by either: --        Was hypotension present within one hour of the conclusion of crystalloid fluid administration? --        Date of presentation of septic shock --        Time of presentation of septic shock --              User Key  (r) = Recorded By, (t) = Taken By, (c) = Cosigned By    [address] Name Provider Type     William Bauer MD Physician                              MDM  Number of Diagnoses or Management Options  BENJI (acute kidney injury) [facility]): new and requires workup  [facility]) [facility] new and requires workup  Hyperphosphatemia: new and requires workup  Hypothyroidism: new and requires workup  Noncompliance with medications: new and requires workup  Diagnosis management comments: 36year old male presents for evaluaton of nausea, vomiting and abdominal pain  [place]ul breathing, undetectably high blood glucose level on arrival with pH 6 863 consistent with DKA  Patient noncompliant with medications  IV fluids  Insulin drip  Potassium replacement  Discussed with critical care team  25mEq bicarb given due to significant acidosis  Patient admitted for further evaluation and management         Amount and/or Complexity of Data Reviewed  Clinical lab tests: ordered and reviewed  Tests in the radiology section of CPT®: ordered and reviewed  Discuss the patient with other providers: yes  Independent visualization of images, tracings, or specimens: yes    Patient Progress  Patient progress: stable      Disposition  Final diagnoses:   DKA (diabetic ketoacidosis) ([place] [address] )   Hyperphosphatemia   Hypothyroidism   Noncompliance with medications   BENJI (acute kidney injury) ([place] [address] )     Time reflects when diagnosis was documented in both MDM as applicable and the Disposition within this note     Time User Action Codes Description Comment    9/7/2022  8:32 PM Pretty Loraine Add [E11 10] DKA (diabetic ketoacidosis) ([place] [address] )     9/7/2022  8:35 PM Pretty Loraine Add [E83 39] Hyperphosphatemia     9/7/2022  9:12 PM Pretty Loraine Add [E03 9] Hypothyroidism     9/7/2022  9:13 PM Pretty Loraine Add [Z91 14] Noncompliance with medications     9/7/2022  9:23 PM Hima Veloz Add [N17 9] BENJI (acute kidney injury) ([place] [address] )     9/9/2022  9:40 AM Willaim Larve Add [E10 65] Type 1 diabetes mellitus with hyperglycemia ([place] [address] )     9/9/2022  1:53 PM Willaim Larve Add [R13 10] Dysphagia       ED Disposition     ED Disposition   Admit    Condition   Stable    Date/Time   Wed Sep 7, 2022  9:24 PM    Comment   Case was discussed with Critical Care and the patient's admission status was agreed to be Admission Status: inpatient status to the service of Dr Guillermo Wellington  Follow-up Information    None         Current Discharge Medication List      CONTINUE these medications which have NOT CHANGED    Details   levothyroxine 75 mcg tablet Take 75 mcg by mouth daily      insulin NPH (HumuLIN N,NovoLIN N) 100 Units/mL subcutaneous injection Inject 24 units under the skin daily before breakfast and 18 units before dinner  Qty: 10 mL, Refills: 3    Associated Diagnoses: Severe hyperglycemia due to diabetes mellitus ([place] [address] )             No discharge procedures on file      PDMP Review       Value Time User    PDMP Reviewed  Yes 5/7/2020  6:56 PM Cherrie Alvarez DO          ED Provider  Electronically Signed by           Jasmin Betts MD  09/10/22 9403

## 2022-09-08 PROBLEM — N17.9 AKI (ACUTE KIDNEY INJURY) (HCC): Status: ACTIVE | Noted: 2022-09-08

## 2022-09-08 LAB
ANION GAP SERPL CALCULATED.3IONS-SCNC: 10 MMOL/L (ref 4–13)
ANION GAP SERPL CALCULATED.3IONS-SCNC: 11 MMOL/L (ref 4–13)
ANION GAP SERPL CALCULATED.3IONS-SCNC: 14 MMOL/L (ref 4–13)
ANION GAP SERPL CALCULATED.3IONS-SCNC: 22 MMOL/L (ref 4–13)
ANION GAP SERPL CALCULATED.3IONS-SCNC: 28 MMOL/L (ref 4–13)
ANION GAP SERPL CALCULATED.3IONS-SCNC: 7 MMOL/L (ref 4–13)
BACTERIA UR QL AUTO: ABNORMAL /HPF
BASE EXCESS BLDA CALC-SCNC: -13.5 MMOL/L
BASOPHILS # BLD AUTO: 0.01 THOUSANDS/ΜL (ref 0–0.1)
BASOPHILS NFR BLD AUTO: 0 % (ref 0–1)
BILIRUB UR QL STRIP: NEGATIVE
BUN SERPL-MCNC: 10 MG/DL (ref 5–25)
BUN SERPL-MCNC: 14 MG/DL (ref 5–25)
BUN SERPL-MCNC: 20 MG/DL (ref 5–25)
BUN SERPL-MCNC: 25 MG/DL (ref 5–25)
BUN SERPL-MCNC: 7 MG/DL (ref 5–25)
BUN SERPL-MCNC: 9 MG/DL (ref 5–25)
CALCIUM SERPL-MCNC: 5.7 MG/DL (ref 8.3–10.1)
CALCIUM SERPL-MCNC: 6.9 MG/DL (ref 8.3–10.1)
CALCIUM SERPL-MCNC: 7.3 MG/DL (ref 8.3–10.1)
CALCIUM SERPL-MCNC: 7.3 MG/DL (ref 8.3–10.1)
CALCIUM SERPL-MCNC: 7.5 MG/DL (ref 8.3–10.1)
CALCIUM SERPL-MCNC: 8 MG/DL (ref 8.3–10.1)
CHLORIDE SERPL-SCNC: 102 MMOL/L (ref 96–108)
CHLORIDE SERPL-SCNC: 106 MMOL/L (ref 96–108)
CHLORIDE SERPL-SCNC: 106 MMOL/L (ref 96–108)
CHLORIDE SERPL-SCNC: 107 MMOL/L (ref 96–108)
CHLORIDE SERPL-SCNC: 115 MMOL/L (ref 96–108)
CHLORIDE SERPL-SCNC: 98 MMOL/L (ref 96–108)
CLARITY UR: CLEAR
CO2 SERPL-SCNC: 12 MMOL/L (ref 21–32)
CO2 SERPL-SCNC: 17 MMOL/L (ref 21–32)
CO2 SERPL-SCNC: 19 MMOL/L (ref 21–32)
CO2 SERPL-SCNC: 20 MMOL/L (ref 21–32)
CO2 SERPL-SCNC: 24 MMOL/L (ref 21–32)
CO2 SERPL-SCNC: 6 MMOL/L (ref 21–32)
COLOR UR: YELLOW
CREAT SERPL-MCNC: 0.7 MG/DL (ref 0.6–1.3)
CREAT SERPL-MCNC: 0.78 MG/DL (ref 0.6–1.3)
CREAT SERPL-MCNC: 0.87 MG/DL (ref 0.6–1.3)
CREAT SERPL-MCNC: 1.18 MG/DL (ref 0.6–1.3)
CREAT SERPL-MCNC: 1.19 MG/DL (ref 0.6–1.3)
CREAT SERPL-MCNC: 1.36 MG/DL (ref 0.6–1.3)
EOSINOPHIL # BLD AUTO: 0 THOUSAND/ΜL (ref 0–0.61)
EOSINOPHIL NFR BLD AUTO: 0 % (ref 0–6)
ERYTHROCYTE [DISTWIDTH] IN BLOOD BY AUTOMATED COUNT: 13.4 % (ref 11.6–15.1)
EST. AVERAGE GLUCOSE BLD GHB EST-MCNC: 315 MG/DL
GFR SERPL CREATININE-BSD FRML MDRD: 105 ML/MIN/1.73SQ M
GFR SERPL CREATININE-BSD FRML MDRD: 110 ML/MIN/1.73SQ M
GFR SERPL CREATININE-BSD FRML MDRD: 115 ML/MIN/1.73SQ M
GFR SERPL CREATININE-BSD FRML MDRD: 63 ML/MIN/1.73SQ M
GFR SERPL CREATININE-BSD FRML MDRD: 74 ML/MIN/1.73SQ M
GFR SERPL CREATININE-BSD FRML MDRD: 75 ML/MIN/1.73SQ M
GLUCOSE SERPL-MCNC: 101 MG/DL (ref 65–140)
GLUCOSE SERPL-MCNC: 112 MG/DL (ref 65–140)
GLUCOSE SERPL-MCNC: 124 MG/DL (ref 65–140)
GLUCOSE SERPL-MCNC: 138 MG/DL (ref 65–140)
GLUCOSE SERPL-MCNC: 166 MG/DL (ref 65–140)
GLUCOSE SERPL-MCNC: 174 MG/DL (ref 65–140)
GLUCOSE SERPL-MCNC: 194 MG/DL (ref 65–140)
GLUCOSE SERPL-MCNC: 204 MG/DL (ref 65–140)
GLUCOSE SERPL-MCNC: 218 MG/DL (ref 65–140)
GLUCOSE SERPL-MCNC: 224 MG/DL (ref 65–140)
GLUCOSE SERPL-MCNC: 233 MG/DL (ref 65–140)
GLUCOSE SERPL-MCNC: 237 MG/DL (ref 65–140)
GLUCOSE SERPL-MCNC: 247 MG/DL (ref 65–140)
GLUCOSE SERPL-MCNC: 251 MG/DL (ref 65–140)
GLUCOSE SERPL-MCNC: 256 MG/DL (ref 65–140)
GLUCOSE SERPL-MCNC: 264 MG/DL (ref 65–140)
GLUCOSE SERPL-MCNC: 285 MG/DL (ref 65–140)
GLUCOSE SERPL-MCNC: 290 MG/DL (ref 65–140)
GLUCOSE SERPL-MCNC: 323 MG/DL (ref 65–140)
GLUCOSE SERPL-MCNC: 348 MG/DL (ref 65–140)
GLUCOSE SERPL-MCNC: 412 MG/DL (ref 65–140)
GLUCOSE SERPL-MCNC: 480 MG/DL (ref 65–140)
GLUCOSE SERPL-MCNC: 80 MG/DL (ref 65–140)
GLUCOSE SERPL-MCNC: 82 MG/DL (ref 65–140)
GLUCOSE SERPL-MCNC: 85 MG/DL (ref 65–140)
GLUCOSE SERPL-MCNC: 87 MG/DL (ref 65–140)
GLUCOSE SERPL-MCNC: 94 MG/DL (ref 65–140)
GLUCOSE SERPL-MCNC: 98 MG/DL (ref 65–140)
GLUCOSE SERPL-MCNC: >500 MG/DL (ref 65–140)
GLUCOSE UR STRIP-MCNC: ABNORMAL MG/DL
HBA1C MFR BLD: 12.6 %
HCO3 BLDA-SCNC: 9.8 MMOL/L (ref 22–28)
HCT VFR BLD AUTO: 33.7 % (ref 36.5–49.3)
HGB BLD-MCNC: 11.9 G/DL (ref 12–17)
HGB UR QL STRIP.AUTO: ABNORMAL
IMM GRANULOCYTES # BLD AUTO: 0.05 THOUSAND/UL (ref 0–0.2)
IMM GRANULOCYTES NFR BLD AUTO: 0 % (ref 0–2)
KETONES UR STRIP-MCNC: ABNORMAL MG/DL
LEUKOCYTE ESTERASE UR QL STRIP: NEGATIVE
LYMPHOCYTES # BLD AUTO: 2.02 THOUSANDS/ΜL (ref 0.6–4.47)
LYMPHOCYTES NFR BLD AUTO: 17 % (ref 14–44)
MAGNESIUM SERPL-MCNC: 1.4 MG/DL (ref 1.6–2.6)
MAGNESIUM SERPL-MCNC: 2 MG/DL (ref 1.6–2.6)
MAGNESIUM SERPL-MCNC: 2.2 MG/DL (ref 1.6–2.6)
MAGNESIUM SERPL-MCNC: 2.2 MG/DL (ref 1.6–2.6)
MAGNESIUM SERPL-MCNC: 2.3 MG/DL (ref 1.6–2.6)
MAGNESIUM SERPL-MCNC: 2.5 MG/DL (ref 1.6–2.6)
MCH RBC QN AUTO: 30.4 PG (ref 26.8–34.3)
MCHC RBC AUTO-ENTMCNC: 35.3 G/DL (ref 31.4–37.4)
MCV RBC AUTO: 86 FL (ref 82–98)
MONOCYTES # BLD AUTO: 0.88 THOUSAND/ΜL (ref 0.17–1.22)
MONOCYTES NFR BLD AUTO: 7 % (ref 4–12)
MUCOUS THREADS UR QL AUTO: ABNORMAL
NEUTROPHILS # BLD AUTO: 8.95 THOUSANDS/ΜL (ref 1.85–7.62)
NEUTS SEG NFR BLD AUTO: 76 % (ref 43–75)
NITRITE UR QL STRIP: NEGATIVE
NON-SQ EPI CELLS URNS QL MICRO: ABNORMAL /HPF
NRBC BLD AUTO-RTO: 0 /100 WBCS
O2 CT BLDA-SCNC: 17.2 ML/DL (ref 16–23)
OXYHGB MFR BLDA: 97.2 % (ref 94–97)
PCO2 BLDA: 18.2 MM HG (ref 36–44)
PH BLDA: 7.35 [PH] (ref 7.35–7.45)
PH UR STRIP.AUTO: 6 [PH]
PHOSPHATE SERPL-MCNC: 0.5 MG/DL (ref 2.7–4.5)
PHOSPHATE SERPL-MCNC: 1 MG/DL (ref 2.7–4.5)
PHOSPHATE SERPL-MCNC: 1.1 MG/DL (ref 2.7–4.5)
PHOSPHATE SERPL-MCNC: 3.4 MG/DL (ref 2.7–4.5)
PHOSPHATE SERPL-MCNC: <0.5 MG/DL (ref 2.7–4.5)
PHOSPHATE SERPL-MCNC: <0.5 MG/DL (ref 2.7–4.5)
PLATELET # BLD AUTO: 339 THOUSANDS/UL (ref 149–390)
PMV BLD AUTO: 10.6 FL (ref 8.9–12.7)
PO2 BLDA: 101.5 MM HG (ref 75–129)
POTASSIUM SERPL-SCNC: 2.6 MMOL/L (ref 3.5–5.3)
POTASSIUM SERPL-SCNC: 2.9 MMOL/L (ref 3.5–5.3)
POTASSIUM SERPL-SCNC: 3.1 MMOL/L (ref 3.5–5.3)
POTASSIUM SERPL-SCNC: 3.5 MMOL/L (ref 3.5–5.3)
POTASSIUM SERPL-SCNC: 3.6 MMOL/L (ref 3.5–5.3)
POTASSIUM SERPL-SCNC: 3.8 MMOL/L (ref 3.5–5.3)
PROT UR STRIP-MCNC: ABNORMAL MG/DL
RBC # BLD AUTO: 3.91 MILLION/UL (ref 3.88–5.62)
RBC #/AREA URNS AUTO: ABNORMAL /HPF
SODIUM SERPL-SCNC: 132 MMOL/L (ref 135–147)
SODIUM SERPL-SCNC: 136 MMOL/L (ref 135–147)
SODIUM SERPL-SCNC: 137 MMOL/L (ref 135–147)
SODIUM SERPL-SCNC: 138 MMOL/L (ref 135–147)
SODIUM SERPL-SCNC: 139 MMOL/L (ref 135–147)
SODIUM SERPL-SCNC: 142 MMOL/L (ref 135–147)
SP GR UR STRIP.AUTO: 1.02 (ref 1–1.03)
SPECIMEN SOURCE: ABNORMAL
T4 FREE SERPL-MCNC: 0.49 NG/DL (ref 0.76–1.46)
UROBILINOGEN UR QL STRIP.AUTO: 0.2 E.U./DL
WBC # BLD AUTO: 11.91 THOUSAND/UL (ref 4.31–10.16)
WBC #/AREA URNS AUTO: ABNORMAL /HPF

## 2022-09-08 PROCEDURE — 99291 CRITICAL CARE FIRST HOUR: CPT | Performed by: NURSE PRACTITIONER

## 2022-09-08 PROCEDURE — 83735 ASSAY OF MAGNESIUM: CPT | Performed by: NURSE PRACTITIONER

## 2022-09-08 PROCEDURE — 84100 ASSAY OF PHOSPHORUS: CPT | Performed by: NURSE PRACTITIONER

## 2022-09-08 PROCEDURE — 82948 REAGENT STRIP/BLOOD GLUCOSE: CPT

## 2022-09-08 PROCEDURE — 81001 URINALYSIS AUTO W/SCOPE: CPT | Performed by: NURSE PRACTITIONER

## 2022-09-08 PROCEDURE — 82805 BLOOD GASES W/O2 SATURATION: CPT | Performed by: NURSE PRACTITIONER

## 2022-09-08 PROCEDURE — 82947 ASSAY GLUCOSE BLOOD QUANT: CPT | Performed by: NURSE PRACTITIONER

## 2022-09-08 PROCEDURE — 80048 BASIC METABOLIC PNL TOTAL CA: CPT | Performed by: NURSE PRACTITIONER

## 2022-09-08 PROCEDURE — 85025 COMPLETE CBC W/AUTO DIFF WBC: CPT | Performed by: NURSE PRACTITIONER

## 2022-09-08 RX ORDER — SODIUM CHLORIDE, SODIUM GLUCONATE, SODIUM ACETATE, POTASSIUM CHLORIDE, MAGNESIUM CHLORIDE, SODIUM PHOSPHATE, DIBASIC, AND POTASSIUM PHOSPHATE .53; .5; .37; .037; .03; .012; .00082 G/100ML; G/100ML; G/100ML; G/100ML; G/100ML; G/100ML; G/100ML
1000 INJECTION, SOLUTION INTRAVENOUS ONCE
Status: COMPLETED | OUTPATIENT
Start: 2022-09-08 | End: 2022-09-09

## 2022-09-08 RX ORDER — MAGNESIUM SULFATE HEPTAHYDRATE 40 MG/ML
2 INJECTION, SOLUTION INTRAVENOUS ONCE
Status: COMPLETED | OUTPATIENT
Start: 2022-09-08 | End: 2022-09-08

## 2022-09-08 RX ORDER — CALCIUM GLUCONATE 20 MG/ML
2 INJECTION, SOLUTION INTRAVENOUS ONCE
Status: COMPLETED | OUTPATIENT
Start: 2022-09-08 | End: 2022-09-08

## 2022-09-08 RX ORDER — POTASSIUM CHLORIDE 20 MEQ/1
40 TABLET, EXTENDED RELEASE ORAL ONCE
Status: COMPLETED | OUTPATIENT
Start: 2022-09-08 | End: 2022-09-08

## 2022-09-08 RX ORDER — POTASSIUM CHLORIDE 14.9 MG/ML
20 INJECTION INTRAVENOUS
Status: COMPLETED | OUTPATIENT
Start: 2022-09-08 | End: 2022-09-08

## 2022-09-08 RX ORDER — ONDANSETRON 2 MG/ML
4 INJECTION INTRAMUSCULAR; INTRAVENOUS EVERY 4 HOURS PRN
Status: DISCONTINUED | OUTPATIENT
Start: 2022-09-08 | End: 2022-09-09

## 2022-09-08 RX ORDER — POTASSIUM CHLORIDE 20MEQ/15ML
40 LIQUID (ML) ORAL ONCE
Status: COMPLETED | OUTPATIENT
Start: 2022-09-08 | End: 2022-09-08

## 2022-09-08 RX ORDER — POTASSIUM CHLORIDE 14.9 MG/ML
20 INJECTION INTRAVENOUS ONCE
Status: COMPLETED | OUTPATIENT
Start: 2022-09-08 | End: 2022-09-08

## 2022-09-08 RX ORDER — MAGNESIUM SULFATE HEPTAHYDRATE 40 MG/ML
4 INJECTION, SOLUTION INTRAVENOUS ONCE
Status: COMPLETED | OUTPATIENT
Start: 2022-09-08 | End: 2022-09-08

## 2022-09-08 RX ORDER — SODIUM CHLORIDE 9 MG/ML
10 INJECTION, SOLUTION INTRAVENOUS CONTINUOUS
Status: DISCONTINUED | OUTPATIENT
Start: 2022-09-08 | End: 2022-09-10

## 2022-09-08 RX ADMIN — POTASSIUM CHLORIDE 20 MEQ: 14.9 INJECTION, SOLUTION INTRAVENOUS at 09:32

## 2022-09-08 RX ADMIN — CALCIUM GLUCONATE 2 G: 20 INJECTION, SOLUTION INTRAVENOUS at 14:24

## 2022-09-08 RX ADMIN — DEXTROSE, SODIUM CHLORIDE, SODIUM LACTATE, POTASSIUM CHLORIDE, AND CALCIUM CHLORIDE 250 ML/HR: 5; .6; .31; .03; .02 INJECTION, SOLUTION INTRAVENOUS at 12:32

## 2022-09-08 RX ADMIN — SODIUM CHLORIDE, SODIUM GLUCONATE, SODIUM ACETATE, POTASSIUM CHLORIDE, MAGNESIUM CHLORIDE, SODIUM PHOSPHATE, DIBASIC, AND POTASSIUM PHOSPHATE 1000 ML: .53; .5; .37; .037; .03; .012; .00082 INJECTION, SOLUTION INTRAVENOUS at 21:59

## 2022-09-08 RX ADMIN — DEXTROSE, SODIUM CHLORIDE, SODIUM LACTATE, POTASSIUM CHLORIDE, AND CALCIUM CHLORIDE 250 ML/HR: 5; .6; .31; .03; .02 INJECTION, SOLUTION INTRAVENOUS at 16:13

## 2022-09-08 RX ADMIN — POTASSIUM PHOSPHATE, MONOBASIC AND POTASSIUM PHOSPHATE, DIBASIC 30 MMOL: 224; 236 INJECTION, SOLUTION, CONCENTRATE INTRAVENOUS at 21:58

## 2022-09-08 RX ADMIN — SODIUM CHLORIDE 30 UNITS/HR: 9 INJECTION, SOLUTION INTRAVENOUS at 11:31

## 2022-09-08 RX ADMIN — DEXTROSE, SODIUM CHLORIDE, SODIUM LACTATE, POTASSIUM CHLORIDE, AND CALCIUM CHLORIDE 250 ML/HR: 5; .6; .31; .03; .02 INJECTION, SOLUTION INTRAVENOUS at 08:13

## 2022-09-08 RX ADMIN — SODIUM CHLORIDE 10 ML/HR: 0.9 INJECTION, SOLUTION INTRAVENOUS at 17:59

## 2022-09-08 RX ADMIN — POTASSIUM & SODIUM PHOSPHATES POWDER PACK 280-160-250 MG 2 PACKET: 280-160-250 PACK at 15:34

## 2022-09-08 RX ADMIN — HEPARIN SODIUM 5000 UNITS: 5000 INJECTION INTRAVENOUS; SUBCUTANEOUS at 13:51

## 2022-09-08 RX ADMIN — HEPARIN SODIUM 5000 UNITS: 5000 INJECTION INTRAVENOUS; SUBCUTANEOUS at 21:59

## 2022-09-08 RX ADMIN — POTASSIUM CHLORIDE 40 MEQ: 20 SOLUTION ORAL at 09:28

## 2022-09-08 RX ADMIN — POTASSIUM CHLORIDE 20 MEQ: 14.9 INJECTION, SOLUTION INTRAVENOUS at 02:33

## 2022-09-08 RX ADMIN — POTASSIUM CHLORIDE 20 MEQ: 14.9 INJECTION, SOLUTION INTRAVENOUS at 15:34

## 2022-09-08 RX ADMIN — LEVOTHYROXINE SODIUM 75 MCG: 75 TABLET ORAL at 05:28

## 2022-09-08 RX ADMIN — ONDANSETRON 4 MG: 2 INJECTION INTRAMUSCULAR; INTRAVENOUS at 01:40

## 2022-09-08 RX ADMIN — POTASSIUM CHLORIDE 20 MEQ: 14.9 INJECTION, SOLUTION INTRAVENOUS at 11:33

## 2022-09-08 RX ADMIN — POTASSIUM CHLORIDE 20 MEQ: 14.9 INJECTION, SOLUTION INTRAVENOUS at 06:04

## 2022-09-08 RX ADMIN — POTASSIUM CHLORIDE 40 MEQ: 1500 TABLET, EXTENDED RELEASE ORAL at 13:58

## 2022-09-08 RX ADMIN — POTASSIUM PHOSPHATE, MONOBASIC POTASSIUM PHOSPHATE, DIBASIC: 224; 236 INJECTION, SOLUTION, CONCENTRATE INTRAVENOUS at 14:11

## 2022-09-08 RX ADMIN — MAGNESIUM SULFATE HEPTAHYDRATE 4 G: 40 INJECTION, SOLUTION INTRAVENOUS at 13:52

## 2022-09-08 RX ADMIN — POTASSIUM & SODIUM PHOSPHATES POWDER PACK 280-160-250 MG 2 PACKET: 280-160-250 PACK at 09:28

## 2022-09-08 RX ADMIN — HEPARIN SODIUM 5000 UNITS: 5000 INJECTION INTRAVENOUS; SUBCUTANEOUS at 05:30

## 2022-09-08 RX ADMIN — DEXTROSE, SODIUM CHLORIDE, SODIUM LACTATE, POTASSIUM CHLORIDE, AND CALCIUM CHLORIDE 250 ML/HR: 5; .6; .31; .03; .02 INJECTION, SOLUTION INTRAVENOUS at 04:14

## 2022-09-08 RX ADMIN — NICOTINE 21 MG: 21 PATCH, EXTENDED RELEASE TRANSDERMAL at 08:15

## 2022-09-08 RX ADMIN — SODIUM CHLORIDE, SODIUM GLUCONATE, SODIUM ACETATE, POTASSIUM CHLORIDE, MAGNESIUM CHLORIDE, SODIUM PHOSPHATE, DIBASIC, AND POTASSIUM PHOSPHATE 250 ML/HR: .53; .5; .37; .037; .03; .012; .00082 INJECTION, SOLUTION INTRAVENOUS at 03:28

## 2022-09-08 RX ADMIN — POTASSIUM CHLORIDE 20 MEQ: 14.9 INJECTION, SOLUTION INTRAVENOUS at 13:57

## 2022-09-08 RX ADMIN — MAGNESIUM SULFATE HEPTAHYDRATE 2 G: 2 INJECTION, SOLUTION INTRAVENOUS at 00:51

## 2022-09-08 RX ADMIN — POTASSIUM CHLORIDE 20 MEQ: 14.9 INJECTION, SOLUTION INTRAVENOUS at 01:06

## 2022-09-08 RX ADMIN — SODIUM CHLORIDE 0.3 UNITS/HR: 9 INJECTION, SOLUTION INTRAVENOUS at 17:52

## 2022-09-08 RX ADMIN — SODIUM CHLORIDE 30 UNITS/HR: 9 INJECTION, SOLUTION INTRAVENOUS at 08:12

## 2022-09-08 RX ADMIN — POTASSIUM & SODIUM PHOSPHATES POWDER PACK 280-160-250 MG 2 PACKET: 280-160-250 PACK at 22:00

## 2022-09-08 RX ADMIN — SODIUM CHLORIDE, SODIUM GLUCONATE, SODIUM ACETATE, POTASSIUM CHLORIDE, MAGNESIUM CHLORIDE, SODIUM PHOSPHATE, DIBASIC, AND POTASSIUM PHOSPHATE 500 ML/HR: .53; .5; .37; .037; .03; .012; .00082 INJECTION, SOLUTION INTRAVENOUS at 01:43

## 2022-09-08 NOTE — ASSESSMENT & PLAN NOTE
· Pt has not taken his home med for over a month  · TSH on admission 24 5  · Resume home levothyroxine

## 2022-09-08 NOTE — ASSESSMENT & PLAN NOTE
· Severe metabolic acidosis secondary to DKA for medication noncompliance  · Ph on admission 6 86, serum bicarb < 5  · Fluid resuscitated per DKA protocol started and will continue with aggressive fluid resuscitation  · Given 1 amp bicarb in the ED  · Isamar placed for frequent lab draws  · Check ABG stat now and consider further bicarb   · Continue treatment for DKA as discussed above

## 2022-09-08 NOTE — ASSESSMENT & PLAN NOTE
· Baseline creat appears to be 0 6-0 8  · Admission creat 2 05  · Likely 2/2 intravascular dehydration in the setting of DKA   · Resolvedwith IVF administration   · UA neg for infection  · Strict I&O   · Avoid hypotension and nephrotoxic agents

## 2022-09-08 NOTE — ASSESSMENT & PLAN NOTE
Lab Results   Component Value Date    HGBA1C >14 0 (H) 05/14/2020       Recent Labs     09/07/22 1952 09/07/22 2103 09/07/22 2202 09/07/22  2302   POCGLU >500* >500* >500* >500*       Blood Sugar Average: Last 72 hrs:  · Pt with Hx of IDDM, reports he stopped taking his insulin 5 days PTA because he didn't want to take it  · Reports for for 3 days PTA, he had N/V, abd pain  · DKA in the ED  · Pertinent labs: Ph 6 86, serum bicarb <5, , AG unable to be calculated  · Received DKA fluid resuscitation in the ED, Also given 1 amp bicarb, 7 units regular insulin bolus and started on DKA insulin gtt  · Continue insulin and fluid resuscitation per DKA protocol  · Mamou placed for frequent labs  · NPO  · Check A1c  · Plan to transition to regular insulin gtt when AG closed x 2 and serum bicarb above 22

## 2022-09-08 NOTE — CASE MANAGEMENT
Case Management Assessment & Discharge Planning Note    Patient name Deya Cerna  Location  SDU/-01 S* MRN [id number]  : 1980 Date 2022       Current Admission Date: 2022  Current Admission Diagnosis:Diabetic ketoacidosis without coma [facility])   Patient Active Problem List    Diagnosis Date Noted    BENJI (acute kidney injury) ([facility] ) 2022    Diabetic ketoacidosis without coma ([facility] ) 2022    Medically noncompliant     Metabolic acidosis     Hypokalemia 2020    Head injury 2020    Methamphetamine abuse ([place] [address] ) 2020    Type 1 diabetes mellitus with hyperglycemia ([place] [address] ) 2020    Heroin use 2020    Hyponatremia 2020    Transaminitis 2020    Nicotine dependence 2020    Hand laceration 2020    Hypothyroidism 2020      LOS (days): 1  Geometric Mean LOS (GMLOS) (days):   Days to GMLOS:     OBJECTIVE:    Risk of Unplanned Readmission Score: 16 38     Current admission status: Inpatient    Preferred Pharmacy:   [address] [place] [address] #[id number] RONALD Cheung [address] [place] [id number]  Phone: [phone] Fax: [phone]    Primary Care Provider: No primary care provider on file  Primary Insurance: [employer]  Secondary Insurance:     ASSESSMENT:  Active Health Care Proxies    There are no active Health Care Proxies on file  Readmission Root Cause  30 Day Readmission: No    Patient Information  Admitted from[de-identified] Home  Mental Status: Alert  During Assessment patient was accompanied by: Not accompanied during assessment  Assessment information provided by[de-identified] Patient  Support Systems: Friend, Kishore [address] of Residence: [address] [place] [address] do you live in?: [address] [facility] entry access options   Select all that apply : No steps to enter home  Type of Current Residence: 2 story home  Upon entering residence, is there a bedroom on the main floor (no further steps)?: No  A bedroom is located on the following floor levels of residence (select all that apply):: 2nd Floor  Upon entering residence, is there a bathroom on the main floor (no further steps)?: No  Indicate which floors of current residence have a bathroom (select all the apply):: 2nd Floor  Number of steps to 2nd floor from main floor: One Flight  In the last 12 months, was there a time when you were not able to pay the mortgage or rent on time?: No  In the last 12 months, how many places have you lived?: 2  In the last 12 months, was there a time when you did not have a steady place to sleep or slept in a shelter (including now)?: No  Homeless/housing insecurity resource given?: N/A  Living Arrangements: Lives w/ Friend  Is patient a ?: No    Activities of Daily Living Prior to Admission  Functional Status: Independent  Completes ADLs independently?: Yes  Ambulates independently?: Yes  Does patient use assisted devices?: No  Does patient currently own DME?: No  Does patient have a history of Outpatient Therapy (PT/OT)?: No  Does the patient have a history of Short-Term Rehab?: No  Does patient have a history of HHC?: No  Does patient currently have [employer] [employer]?: No    Patient Information Continued  Income Source: Unemployed  Does patient have prescription coverage?: Yes  Within the past 12 months, you worried that your food would run out before you got the money to buy more : Never true  Within the past 12 months, the food you bought just didn't last and you didn't have money to get more : Never true  Food insecurity resource given?: N/A  Does patient receive dialysis treatments?: No  Does patient have a history of substance abuse?: Yes  Historical substance use preference: Methamphetamines    Means of Transportation  Means of Transport to Appts[de-identified] Friends  Was application for public transport provided?: N/A    DISCHARGE DETAILS:  Additional Comments: Met with Pt  Pt alert but kept eyes closed during interview   Pt reports he is staying with a friend since he got out of intermediate in [address] [place] [address], no david  Independent PTA  No DME  Requesting new glucometer  Pt reports he uses [facility] in [facility] for his insulin and also uses [employer] in [place]  Denies hx of VNA/ SNF  Pt goes to IOP at [facility]  Pt reports his friend will transport him home or will need Lyft ride

## 2022-09-08 NOTE — ASSESSMENT & PLAN NOTE
Lab Results   Component Value Date    HGBA1C >14 0 (H) 05/14/2020       Recent Labs     09/07/22 1952 09/07/22 2103 09/07/22 2202 09/07/22 2302   POCGLU >500* >500* >500* >500*       Blood Sugar Average: Last 72 hrs:  · Medication noncompliance at home, hasn't taken his insulin for 5 days PTA  · Admitted with DKA  · See plan above under DKA

## 2022-09-08 NOTE — UTILIZATION REVIEW
Inpatient Admission Authorization Request   NOTIFICATION OF INPATIENT ADMISSION/INPATIENT AUTHORIZATION REQUEST   SERVICING FACILITY:   [facility] [address]  [address] [place] [address]  Tax ID: [id number]  NPI: [id number]  Place of Service: Inpatient [address] [facility] Code: 24     ATTENDING PROVIDER:  Attending Name and NPI#: Saji Joe [[id number]]  Address: [address] [place] [address]  Phone: [phone]     UTILIZATION REVIEW CONTACT:  José Manuel Quinonez, Utilization   Network Utilization Review Department  Phone: [phone]  Fax [phone]  Email: [email or web address]     PHYSICIAN ADVISORY SERVICES:  FOR CXKN-RB-WURL REVIEW - MEDICAL NECESSITY DENIAL  Phone: [phone]  Fax: [phone]  Email: [email or web address]     TYPE OF REQUEST:  Inpatient Status     ADMISSION INFORMATION:  ADMISSION DATE/TIME: 9/7/22  9:24 PM  PATIENT DIAGNOSIS CODE/DESCRIPTION:  Hyperphosphatemia [E83 39]  Hypothyroidism [E03 9]  DKA (diabetic ketoacidosis) ([facility] ) [E11 10]  Hyperglycemia [R73 9]  BENJI (acute kidney injury) ([facility] ) [N17 9]  Noncompliance with medications [Z91 14]  DISCHARGE DATE/TIME: No discharge date for patient encounter  IMPORTANT INFORMATION:  Please contact Aura Lopez directly with any questions or concerns regarding this request  Department voicemails are confidential     Send requests for admission clinical reviews, concurrent reviews, approvals, and administrative denials due to lack of clinical to fax [phone]

## 2022-09-08 NOTE — PROCEDURES
Arterial Line Insertion    Date/Time: 9/7/2022 11:30 PM  Performed by: MELISSA Hunt  Authorized by: MELISSA Hunt     Patient location:  Bedside and ICU  Consent:     Consent given by:  Patient    Risks discussed:  Repeat procedure, bleeding, infection, pain and ischemia  Universal protocol:     Required blood products, implants, devices, and special equipment available: yes      Patient identity confirmed:  Verbally with patient, arm band and provided demographic data  Indications:     Indications: hemodynamic monitoring, multiple ABGs and frequent labs / infusion    Pre-procedure details:     Skin preparation:  Chlorhexidine    Preparation: Patient was prepped and draped in sterile fashion    Anesthesia (see MAR for exact dosages): Anesthesia method:  Local infiltration    Local anesthetic:  Lidocaine 2% w/o epi  Procedure details:     Location / Tip of Catheter:  Radial    Laterality:  Right    Dutch's test performed: yes      Dutch's test abnormal: no      Placement technique:  Percutaneous    Number of attempts:  2    Successful placement: yes      Transducer: waveform confirmed    Post-procedure details:     Post-procedure:  Sterile dressing applied and sutured    CMS:  Normal and unchanged    Patient tolerance of procedure:   Tolerated well, no immediate complications

## 2022-09-08 NOTE — QUICK NOTE
Attempt made to call patient's Musa Tatum per his request for daily update  No answer  VM left with callback number

## 2022-09-08 NOTE — ASSESSMENT & PLAN NOTE
Lab Results   Component Value Date    HGBA1C 12 6 (H) 09/07/2022       Recent Labs     09/08/22  1615 09/08/22  1705 09/08/22  1758 09/08/22  1903   POCGLU 82 87 101 138       Blood Sugar Average: Last 72 hrs:  · (P) 194Medication noncompliance at home, hasn't taken his insulin for 5 days PTA  · Admitted with DKA  · See plan above under DKA

## 2022-09-08 NOTE — QUICK NOTE
Received call back from Celio [address] aunt  We reviewed his improving acidosis and concern for compliance post discharge  She was appreciative of the update and had no further questions

## 2022-09-08 NOTE — H&P
Gilberto Hauser  H&P- Carmen Gonzaleser 1980, 36 y o  male MRN: [id number]  Unit/Bed#: -01 SDU Encounter: [id number]  Primary Care Provider: No primary care provider on file  Date and time admitted to hospital: 9/7/2022  7:46 PM    * Diabetic ketoacidosis without coma [facility])  Assessment & Plan  Lab Results   Component Value Date    HGBA1C >14 0 (H) 05/14/2020       Recent Labs     09/07/22 1952 09/07/22 2103 09/07/22 2202 09/07/22  2302   POCGLU >500* >500* >500* >500*       Blood Sugar Average: Last 72 hrs:  · Pt with Hx of IDDM, reports he stopped taking his insulin 5 days PTA because he didn't want to take it  · Reports for for 3 days PTA, he had N/V, abd pain  · DKA in the ED  · Pertinent labs: Ph 6 86, serum bicarb <5, , AG unable to be calculated  · Received DKA fluid resuscitation in the ED, Also given 1 amp bicarb, 7 units regular insulin bolus and started on DKA insulin gtt  · Continue insulin and fluid resuscitation per DKA protocol  · Isamar placed for frequent labs  · NPO  · Check A1c  · Plan to transition to regular insulin gtt when AG closed x 2 and serum bicarb above 22    Metabolic acidosis  Assessment & Plan  · Severe metabolic acidosis secondary to DKA for medication noncompliance  · Ph on admission 6 86, serum bicarb < 5  · Fluid resuscitated per DKA protocol started and will continue with aggressive fluid resuscitation  · Given 1 amp bicarb in the ED  · Isamar placed for frequent lab draws  · Check ABG stat now and consider further bicarb   · Continue treatment for DKA as discussed above    Medically noncompliant  Assessment & Plan  · Reports not taking his home synthroid for over a month and hasn't had his insulin in 5 days  He reports he has these medications at home, he just wasn't taking them  · Will need further education when appropriate on the importance of medication compliance   · Pt also requesting a glucometer for home since he doesn't have one  Care management consulted  Hypothyroidism  Assessment & Plan  · Pt has not taken his home med for over a month  · TSH on admission 24 5  · Resume home levothyroxine    Type 1 diabetes mellitus with hyperglycemia [facility])  Assessment & Plan  Lab Results   Component Value Date    HGBA1C >14 0 (H) 05/14/2020       Recent Labs     09/07/22  1952 09/07/22  2103 09/07/22  2202 09/07/22  2302   POCGLU >500* >500* >500* >500*       Blood Sugar Average: Last 72 hrs:  · Medication noncompliance at home, hasn't taken his insulin for 5 days PTA  · Admitted with DKA  · See plan above under DKA    Nicotine dependence  Assessment & Plan  · PAD smoker  · Encourage cessation   · Start NRT with patch    Hyponatremia  Assessment & Plan  · Pseudo-hyponatremia  · Na 124 on admission, Corrected for glucose it was 137      -------------------------------------------------------------------------------------------------------------  Chief Complaint: N/V/Abdominal pain    History of Present Illness     Tyler Rodriguez is a 36 y o  male who presents with DKA secondary to medication noncompliance  PMHx hypothyroidism, IDDM, polysubstance abuse but reports only daily cannabis smoker now, and tobacco smoker PAD  Pt reports he stopped taking his levothyroxine over a month ago and stopped his insulin 5 days PTA  Three days PTA he started to have abdominal pain, N/V, polyuria, polydipsia  Pertinent labs in the ED: Ph 6 86, , AG unable to be calculated, serum bicarb <5  Given 1 amp bicarb, insulin bolus, started on insulin gtt, and started with aggressive fluid resuscitation in ED  History obtained from chart review and the patient   -------------------------------------------------------------------------------------------------------------  Dispo:  Admit to Stepdown Level 1    Code Status: Level 1 - Full Code  --------------------------------------------------------------------------------------------------------------  Review of Systems   Constitutional: Positive for activity change  HENT: Negative  Eyes: Negative  Respiratory:        Tachypnea   Cardiovascular: Negative  Gastrointestinal: Positive for abdominal pain, nausea and vomiting  Endocrine: Positive for polydipsia and polyuria  Genitourinary: Negative  Musculoskeletal: Negative  Skin: Negative  Allergic/Immunologic: Negative  Neurological: Negative  Hematological: Negative  Psychiatric/Behavioral: Negative  Physical Exam  Vitals and nursing note reviewed  Constitutional:       General: He is in acute distress  Appearance: He is normal weight  He is ill-appearing  HENT:      Head: Normocephalic and atraumatic  Nose: Nose normal       Mouth/Throat:      Mouth: Mucous membranes are dry  Pharynx: Oropharynx is clear  Eyes:      Extraocular Movements: Extraocular movements intact  Conjunctiva/sclera: Conjunctivae normal       Pupils: Pupils are equal, round, and reactive to light  Cardiovascular:      Rate and Rhythm: Regular rhythm  Tachycardia present  Pulses: Normal pulses  Heart sounds: Normal heart sounds  Comments:  bpm  Pulmonary:      Effort: Pulmonary effort is normal       Breath sounds: Normal breath sounds  Comments: On RA with SpO2 99%, RR 30 bpm, LS CTA b/L  Abdominal:      General: Bowel sounds are normal  There is no distension  Palpations: Abdomen is soft  Tenderness: There is no abdominal tenderness  Musculoskeletal:         General: Normal range of motion  Cervical back: Normal range of motion and neck supple  Skin:     General: Skin is warm and dry  Neurological:      General: No focal deficit present  Mental Status: He is alert and oriented to person, place, and time  Mental status is at baseline  Cranial Nerves: No cranial nerve deficit     Psychiatric:         Mood and Affect: Mood normal  --------------------------------------------------------------------------------------------------------------  Vitals:   Vitals:    09/07/22 2130 09/07/22 2215 09/07/22 2330 09/07/22 2359   BP: (!) 173/85 154/84 161/79 (!) 176/72   BP Location:  Left arm  Left arm   Pulse: 98 104 (!) 108 (!) 109   Resp: (!) 32 (!) 30 (!) 33 (!) 28   Temp:  (!) 96 2 °F (35 7 °C)  97 9 °F (36 6 °C)   TempSrc:  Axillary  Axillary   SpO2: 97% 98% 97% 97%   Weight:  73 kg (160 lb 15 oz)     Height:  5' 11" (1 803 m)       Temp  Min: 96 2 °F (35 7 °C)  Max: 97 9 °F (36 6 °C)  IBW (Ideal Body Weight): 75 3 kg  Height: 5' 11" (180 3 cm)  Body mass index is 22 45 kg/m²      Laboratory and Diagnostics:  Results from last 7 days   Lab Units 09/07/22 2007 09/07/22 2004   WBC Thousand/uL  --  15 77*   HEMOGLOBIN g/dL  --  14 6   I STAT HEMOGLOBIN g/dl 16 7  --    HEMATOCRIT %  --  47 4   HEMATOCRIT, ISTAT % 49  --    PLATELETS Thousands/uL  --  468*   NEUTROS PCT %  --  83*   MONOS PCT %  --  5     Results from last 7 days   Lab Units 09/07/22 2329 09/07/22 2007 09/07/22 2004   SODIUM mmol/L  --   --  124*   POTASSIUM mmol/L  --   --  4 1   CHLORIDE mmol/L  --   --  84*   CO2 mmol/L  --   --  <5*   CO2, I-STAT mmol/L  --  <5*  --    BUN mg/dL  --   --  28*   CREATININE mg/dL  --   --  2 05*   CALCIUM mg/dL  --   --  8 8   GLUCOSE RANDOM mg/dL 482*  --  897*   ALT U/L  --   --  29   AST U/L  --   --  15   ALK PHOS U/L  --   --  216*   ALBUMIN g/dL  --   --  3 2*   TOTAL BILIRUBIN mg/dL  --   --  0 60     Results from last 7 days   Lab Units 09/07/22  2004   MAGNESIUM mg/dL 2 5   PHOSPHORUS mg/dL 8 4*                   ABG:  Results from last 7 days   Lab Units 09/07/22  2335   PH ART  7 095*   PCO2 ART mm Hg 13 4*   PO2 ART mm Hg 99 8   HCO3 ART mmol/L 4 0*   BASE EXC ART mmol/L -23 5   ABG SOURCE  Line, Arterial     VBG:  Results from last 7 days   Lab Units 09/07/22  2335   ABG SOURCE  Line, Arterial             Imaging: I have personally reviewed pertinent reports  9/7/22 CXR with no acute cardiopulmonary findings per my view  Historical Information   Past Medical History:   Diagnosis Date    Diabetes mellitus ([address] )     Disease of thyroid gland      History reviewed  No pertinent surgical history  Social History   Social History     Substance and Sexual Activity   Alcohol Use Not Currently    Comment: ocassionally     Social History     Substance and Sexual Activity   Drug Use Yes    Types: Methamphetamines, Heroin    Comment: daily, meth last used 1 week ago, heroin last use 5/14     Social History     Tobacco Use   Smoking Status Current Every Day Smoker    Packs/day: 1 00    Years: 20 00    Pack years: 20 00    Types: Cigarettes   Smokeless Tobacco Never Used     Exercise History: ambulates  Family History:   History reviewed  No pertinent family history  Medications:  Current Facility-Administered Medications   Medication Dose Route Frequency    dextrose 5 % in lactated Ringer's infusion  250 mL/hr Intravenous Continuous    heparin (porcine) subcutaneous injection 5,000 Units  5,000 Units Subcutaneous Q8H [address]    insulin regular (HumuLIN R,NovoLIN R) 1 Units/mL in sodium chloride 0 9 % 100 mL infusion  0 1-30 Units/hr Intravenous Continuous    levothyroxine tablet 75 mcg  75 mcg Oral Early Morning    multi-electrolyte (PLASMALYTE-A/ISOLYTE-S PH 7 4) IV solution  500 mL/hr Intravenous Continuous    Followed by   Kourtney Courser multi-electrolyte (PLASMALYTE-A/ISOLYTE-S PH 7 4) IV solution  250 mL/hr Intravenous Continuous    nicotine (NICODERM CQ) 21 mg/24 hr TD 24 hr patch 21 mg  21 mg Transdermal Daily    sodium chloride 0 9 % infusion  500 mL/hr Intravenous Continuous    Followed by   Kourtney Courser sodium chloride 0 9 % infusion  250 mL/hr Intravenous Continuous     Home medications:  Prior to Admission Medications   Prescriptions Last Dose Informant Patient Reported?  Taking?   insulin NPH (HumuLIN N,NovoLIN N) 100 Units/mL subcutaneous injection Unknown at Unknown time  No No   Sig: Inject 24 units under the skin daily before breakfast and 18 units before dinner   levothyroxine 75 mcg tablet Past Month at Unknown time  Yes Yes   Sig: Take 75 mcg by mouth daily      Facility-Administered Medications: None     Allergies:  No Known Allergies    ------------------------------------------------------------------------------------------------------------  Advance Directive and Living Will:      Power of :    POLST:    ------------------------------------------------------------------------------------------------------------  Anticipated Length of Stay is > 2 midnights    Care Time Delivered:   Upon my evaluation, this patient had a high probability of imminent or life-threatening deterioration due to 61, which required my direct attention, intervention, and personal management  I have personally provided Severe metabolic acidosis minutes (0000 to 0100) of critical care time, exclusive of procedures, teaching, family meetings, and any prior time recorded by providers other than myself  MELISSA Pavon        Portions of the record may have been created with voice recognition software  Occasional wrong word or "sound a like" substitutions may have occurred due to the inherent limitations of voice recognition software    Read the chart carefully and recognize, using context, where substitutions have occurred

## 2022-09-08 NOTE — ED NOTES
Placed patient on 2 L NC d/t WOB, tachypnea, and pt stating "I can't breathe"  Dr Angeli Vega made aware        Dianna Doll RN  09/07/22 2122

## 2022-09-08 NOTE — ASSESSMENT & PLAN NOTE
· Severe metabolic acidosis secondary to DKA for medication noncompliance  · Ph on admission 6 86, serum bicarb < 5  · Fluid resuscitated per DKA protocol started and will continue with aggressive fluid resuscitation  · Given 1 amp bicarb in the ED  · Isamar placed for frequent lab draws  · Continue treatment for DKA as discussed above

## 2022-09-08 NOTE — ASSESSMENT & PLAN NOTE
· Reports not taking his home synthroid for over a month and hasn't had his insulin in 5 days  He reports he has these medications at home, he just wasn't taking them  · Will need further education when appropriate on the importance of medication compliance   · Pt also requesting a glucometer for home since he doesn't have one  Care management consulted

## 2022-09-08 NOTE — ASSESSMENT & PLAN NOTE
Lab Results   Component Value Date    HGBA1C 12 6 (H) 09/07/2022       Recent Labs     09/08/22  1615 09/08/22  1705 09/08/22  1758 09/08/22  1903   POCGLU 82 87 101 138       Blood Sugar Average: Last 72 hrs:  · (P) 194Pt with Hx of IDDM, reports he stopped taking his insulin 5 days PTA because he didn't want to take it  · Reports for for 3 days PTA, he had N/V, abd pain   Found to be in DKA  · Pertinent labs: Ph 6 86, serum bicarb <5, , AG unable to be calculated  · A1C: 12 6  · Received DKA fluid resuscitation in the ED, Also given 1 amp bicarb, 7 units regular insulin bolus and started on DKA insulin gtt  · Gap closed x2 and transitioned to non-CC insulin gtt  · q2 hour BG checks  · Transition to home insulin regimen today  · Start PO diet

## 2022-09-08 NOTE — UTILIZATION REVIEW
Initial Clinical Review    Admission: Date/Time/Statement:   Admission Orders (From admission, onward)     Ordered        09/07/22 2124  INPATIENT ADMISSION  Once                      Orders Placed This Encounter   Procedures    INPATIENT ADMISSION     Standing Status:   Standing     Number of Occurrences:   1     Order Specific Question:   Level of Care     Answer:   Level 1 Stepdown [13]     Order Specific Question:   Estimated length of stay     Answer:   More than 2 Midnights     Order Specific Question:   Certification     Answer:   I certify that inpatient services are medically necessary for this patient for a duration of greater than two midnights  See H&P and MD Progress Notes for additional information about the patient's course of treatment  ED Arrival Information     Expected   -    Arrival   9/7/2022 19:40    Acuity   Emergent            Means of arrival   Walk-In    Escorted by   Friend    Service   Critical Care/ICU    Admission type   Emergency            Arrival complaint   weakness           Chief Complaint   Patient presents with    Hyperglycemia - Symptomatic     Type 2 diabteic Pt c/o of nausea vomiting x 3 days  Pt sugars greater than 500  Pt not taking his insulin   Weakness - Generalized       Initial Presentation: 36 y o  male from home to ED admitted inpatient due to DKA/Metabolic acidosis/medical non compliant  Has Diabetes type 1  Presented due to abdominal pain, nausea and vomiting starting 3 days prior to arrival   Has not taken insulin in about 1 week, does not have glucometer, not taken levothyroxine in > 1 month  History of polysubstance abuse, admits to current cannabis  On exam:  Mucous membranes dry  Tachycardia  Tachypnea  Generalized abdominal tenderness  Venous gas 6 863/17 1/57/3 1/65%  glucose 897  Beta-hydroxybutyrate 4 2  CO2 < 5  Bun 28, creatinine 2 05     Wbc 15 77  TSH 24 528     In the ED given 2 liters IVF, Regular Insulin IV and started on gtt, na Bicarb and KCL, Zofran  To continue IV insulin gtt and IVF resuscitation  [place] placed  NPO  Hourly glucose and BMP, Mg, Phos every 4 hours and replete electrolytes as needed  Resume home levothyroxine  Date: 9/8/22    Day 2:  Patient alert  Nausea improved with antiemetics  No focal deficits  Telemetry sinus    K 2 9  CO2-19  Bun 14, creatinine 1 18  Glucose 224  Wbc 11 91  Continues on insulin gtt, IVF,  Replete electrolytes       ED Triage Vitals   Temperature Pulse Respirations Blood Pressure SpO2   09/07/22 2017 09/07/22 2009 09/07/22 2009 09/07/22 2009 09/07/22 2009   (!) 96 9 °F (36 1 °C) 93 (!) 34 170/88 100 %      Temp Source Heart Rate Source Patient Position - Orthostatic VS BP Location FiO2 (%)   09/07/22 2017 09/07/22 2009 09/07/22 2009 09/07/22 2009 --   Temporal Monitor Lying Left arm       Pain Score       09/07/22 2200       No Pain          Wt Readings from Last 1 Encounters:   09/08/22 73 kg (160 lb 15 oz)     Additional Vital Signs:   09/08/22 1001 99 °F (37 2 °C) 85 22 128/73 92 -- -- 94 % None (Room air) Lying   09/08/22 1000 -- 91 15 -- -- -- -- 95 % -- --   09/08/22 0900 -- 93 26 Abnormal  -- -- -- -- 98 % -- --   09/08/22 0800 -- 96 29 Abnormal  -- -- -- -- 97 % -- --   09/08/22 0700 97 9 °F (36 6 °C) 90 24 Abnormal  146/70 96 -- -- 96 % None (Room air) Lying   09/08/22 0400 97 6 °F (36 4 °C) 93 21 154/77 108 172/50 85 mmHg 97 % -- Lying   09/08/22 0300 -- 98 25 Abnormal  143/80 105 173/80 99 mmHg 97 % -- --   09/08/22 0100 -- 103 23 Abnormal  125/68 89 141/60 84 mmHg 97 % -- --   09/07/22 2359 97 9 °F (36 6 °C) 109 Abnormal  28 Abnormal  176/72 Abnormal  103 -- -- 97 % None (Room air) Lying   09/07/22 2330 -- 108 Abnormal  33 Abnormal  161/79 110 173/64 97 mmHg 97 % -- --   09/07/22 2215 96 2 °F (35 7 °C) Abnormal  104 30 Abnormal  154/84 112 -- -- 98 % None (Room air) Lying   O2 Device: 2 L at 09/07/22 2122 09/07/22 2100 -- 98 34 Abnormal  158/92 -- -- -- 99 % None (Room air)        Pertinent Labs/Diagnostic Test Results:   XR chest 1 view portable   ED Interpretation by Luis Harrison MD (09/07 2030)   No acute pulmonary pathology      Final Result by Claudette Torres MD (09/08 6603)      No acute cardiopulmonary disease                    Workstation performed: [id number]           Results from last 7 days   Lab Units 09/07/22 2004   SARS-COV-2  Negative     Results from last 7 days   Lab Units 09/08/22  0350 09/07/22 2007 09/07/22 2004   WBC Thousand/uL 11 91*  --  15 77*   HEMOGLOBIN g/dL 11 9*  --  14 6   I STAT HEMOGLOBIN g/dl  --  16 7  --    HEMATOCRIT % 33 7*  --  47 4   HEMATOCRIT, ISTAT %  --  49  --    PLATELETS Thousands/uL 339  --  468*   NEUTROS ABS Thousands/µL 8 95*  --  13 04*     Results from last 7 days   Lab Units 09/08/22  0817 09/08/22  0350 09/07/22  2329 09/07/22 2007 09/07/22 2004   SODIUM mmol/L 139 136 132*  --  124*   POTASSIUM mmol/L 2 9* 3 1* 3 6  --  4 1   CHLORIDE mmol/L 106 102 98  --  84*   CO2 mmol/L 19* 12* 6*  --  <5*   CO2, I-STAT mmol/L  --   --   --  <5*  --    ANION GAP mmol/L 14* 22* 28*  --   --    BUN mg/dL 14 20 25  --  28*   CREATININE mg/dL 1 18 1 19 1 36*  --  2 05*   EGFR ml/min/1 73sq m 75 74 63  --  38   CALCIUM mg/dL 7 3* 7 3* 7 5*  --  8 8   CALCIUM, IONIZED, ISTAT mmol/L  --   --   --  1 21  --    MAGNESIUM mg/dL 2 2 2 3 2 0  --  2 5   PHOSPHORUS mg/dL <0 5* 0 5* 3 4  --  8 4*     Results from last 7 days   Lab Units 09/07/22 2004   AST U/L 15   ALT U/L 29   ALK PHOS U/L 216*   TOTAL PROTEIN g/dL 8 7*   ALBUMIN g/dL 3 2*   TOTAL BILIRUBIN mg/dL 0 60     Results from last 7 days   Lab Units 09/08/22  1108 09/08/22  1001 09/08/22  0900 09/08/22  0811 09/08/22  0715 09/08/22  0621 09/08/22  0534 09/08/22  0358 09/08/22  0304 09/08/22  0204 09/08/22  0056 09/08/22  0009   POC GLUCOSE mg/dl 174* 194* 218* 251* 264* 290* 285* 247* 256* 323* 348* >500*     Results from last 7 days   Lab Units 09/08/22  0817 09/08/22  0350 09/08/22  0013 09/07/22  2329 09/07/22 2004   GLUCOSE RANDOM mg/dL 224* 237* 412* 480*  482* 897*     Results from last 7 days   Lab Units 09/07/22 2004   HEMOGLOBIN A1C % 12 6*   EAG mg/dl 315     BETA-HYDROXYBUTYRATE   Date Value Ref Range Status   09/07/2022 4 2 (H) <0 6 mmol/L Final      Results from last 7 days   Lab Units 09/08/22  0350 09/07/22  2335   PH ART  7 349* 7 095*   PCO2 ART mm Hg 18 2* 13 4*   PO2 ART mm Hg 101 5 99 8   HCO3 ART mmol/L 9 8* 4 0*   BASE EXC ART mmol/L -13 5 -23 5   O2 CONTENT ART mL/dL 17 2 18 5   O2 HGB, ARTERIAL % 97 2* 96 2   ABG SOURCE  Line, Arterial Line, Arterial     Results from last 7 days   Lab Units 09/07/22 2007   PH, MARY I-STAT  6 863*   PCO2, MARY ISTAT mm HG 17 1*   PO2, MARY ISTAT mm HG 57 0*   HCO3, MARY ISTAT mmol/L 3 1*   I STAT BASE EXC mmol/L -30*   I STAT O2 SAT % 65     Results from last 7 days   Lab Units 09/07/22 2004   TSH 3RD GENERATON uIU/mL 24 528*     Results from last 7 days   Lab Units 09/07/22 2004   LIPASE u/L 116       ED Treatment:   Medication Administration from 09/07/2022 1940 to 09/07/2022 2217       Date/Time Order Dose Route Action Comments     09/07/2022 2012 sodium chloride 0 9 % bolus 1,000 mL 1,000 mL Intravenous New Bag      09/07/2022 2012 ondansetron (ZOFRAN) injection 4 mg 4 mg Intravenous Given      09/07/2022 2058 sodium chloride 0 9 % infusion 1,000 mL/hr Intravenous New Bag      09/07/2022 2214 sodium chloride 0 9 % infusion 500 mL/hr Intravenous New Bag      09/07/2022 2059 potassium chloride 20 mEq IVPB (premix) 20 mEq Intravenous New Bag      09/07/2022 2110 insulin regular (HumuLIN R,NovoLIN R) 1 Units/mL in sodium chloride 0 9 % 100 mL infusion 7 2 Units/hr Intravenous New Bag      09/07/2022 2133 insulin regular (HumuLIN R,NovoLIN R) injection 7 Units 7 Units Intravenous Given      09/07/2022 2134 sodium bicarbonate 8 4 % injection 25 mEq 25 mEq Intravenous Given         Past Medical History:   Diagnosis Date    Diabetes mellitus ([place] [address] )     Disease of thyroid gland      Present on Admission:   Hypothyroidism   Nicotine dependence   Type 1 diabetes mellitus with hyperglycemia (HCC)   Diabetic ketoacidosis without coma ([place] [address] )   Metabolic acidosis   Hyponatremia   BENJI (acute kidney injury) ([place] [address] )      Admitting Diagnosis: Hyperphosphatemia [E83 39]  Hypothyroidism [E03 9]  DKA (diabetic ketoacidosis) ([place] [address] ) [E11 10]  Hyperglycemia [R73 9]  BENJI (acute kidney injury) ([place] [address] ) [N17 9]  Noncompliance with medications [Z91 14]  Age/Sex: 36 y o  male  Admission Orders:  9/7/22 2124 inpatient   Scheduled Medications:  heparin (porcine), 5,000 Units, Subcutaneous, Q8H [address]  levothyroxine, 75 mcg, Oral, Early Morning  nicotine, 21 mg, Transdermal, Daily  potassium-sodium phosphates, 2 packet, Oral, 4x Daily (with meals and at bedtime)    magnesium sulfate 2 g/50 mL IVPB (premix) 2 g  Dose: 2 g  Freq: Once Route: IV  Last Dose: Stopped (09/08/22 0200)  Start: 09/08/22 0045 End: 09/08/22 0200    potassium chloride 20 mEq IVPB (premix) - given x 5 9/8/22   Dose: 20 mEq  Freq: Every 2 hours scheduled Route: IV    potassium chloride oral solution 40 mEq  Dose: 40 mEq  Freq: Once Route: PO  Start: 09/08/22 0930 End: 09/08/22 0928    potassium phosphates 30 mmol in sodium chloride 0 9 % 250 mL IVPB  Freq:  Once Route: IV  Start: 09/08/22 0600    Continuous IV Infusions:  dextrose 5% lactated ringer's, 250 mL/hr, Intravenous, Continuous  insulin regular (HumuLIN R,NovoLIN R) infusion, 0 1-30 Units/hr, Intravenous, Continuous  multi-electrolyte, 250 mL/hr, Intravenous, Continuous    multi-electrolyte (PLASMALYTE-A/ISOLYTE-S PH 7 4) IV solution  Rate: 500 mL/hr Dose: 500 mL/hr  Freq: Continuous Route: IV  Last Dose: Stopped (09/08/22 0328)  Start: 09/07/22 2230 End: 09/08/22 0338    multi-electrolyte (PLASMALYTE-A/ISOLYTE-S PH 7 4) IV solution  Rate: 250 mL/hr Dose: 250 mL/hr  Freq: Continuous Route: IV  Last Dose: Stopped (09/08/22 4065)  Start: 09/08/22 6242 End: 09/08/22 1127      PRN Meds:  ondansetron, 4 mg, Intravenous, Q4H PRN - used x 1 9/8/22     Cardio pulmonary monitoring   NPO      Network Utilization Review Department  ATTENTION: Please call with any questions or concerns to [phone] and carefully listen to the prompts so that you are directed to the right person  All voicemails are confidential   [facility] [place] all requests for admission clinical reviews, approved or denied determinations and any other requests to dedicated fax number below belonging to the campus where the patient is receiving treatment   List of dedicated fax numbers for the Facilities:  [address] DENIALS (Administrative/Medical Necessity) [phone]   [address] (Maternity/NICU/Pediatrics) [phone]   [address]  [address] [facility] [place] [address] [address] [place] [address] [address] [place] [address] [facility] [place] [address] [phone]

## 2022-09-09 LAB
ANION GAP SERPL CALCULATED.3IONS-SCNC: 11 MMOL/L (ref 4–13)
ANION GAP SERPL CALCULATED.3IONS-SCNC: 8 MMOL/L (ref 4–13)
ATRIAL RATE: 92 BPM
BUN SERPL-MCNC: 5 MG/DL (ref 5–25)
BUN SERPL-MCNC: 6 MG/DL (ref 5–25)
CALCIUM SERPL-MCNC: 7 MG/DL (ref 8.3–10.1)
CALCIUM SERPL-MCNC: 8.1 MG/DL (ref 8.3–10.1)
CHLORIDE SERPL-SCNC: 106 MMOL/L (ref 96–108)
CHLORIDE SERPL-SCNC: 98 MMOL/L (ref 96–108)
CO2 SERPL-SCNC: 22 MMOL/L (ref 21–32)
CO2 SERPL-SCNC: 27 MMOL/L (ref 21–32)
CREAT SERPL-MCNC: 0.71 MG/DL (ref 0.6–1.3)
CREAT SERPL-MCNC: 0.72 MG/DL (ref 0.6–1.3)
ERYTHROCYTE [DISTWIDTH] IN BLOOD BY AUTOMATED COUNT: 14.7 % (ref 11.6–15.1)
GFR SERPL CREATININE-BSD FRML MDRD: 114 ML/MIN/1.73SQ M
GFR SERPL CREATININE-BSD FRML MDRD: 114 ML/MIN/1.73SQ M
GLUCOSE SERPL-MCNC: 101 MG/DL (ref 65–140)
GLUCOSE SERPL-MCNC: 119 MG/DL (ref 65–140)
GLUCOSE SERPL-MCNC: 122 MG/DL (ref 65–140)
GLUCOSE SERPL-MCNC: 129 MG/DL (ref 65–140)
GLUCOSE SERPL-MCNC: 144 MG/DL (ref 65–140)
GLUCOSE SERPL-MCNC: 197 MG/DL (ref 65–140)
GLUCOSE SERPL-MCNC: 210 MG/DL (ref 65–140)
GLUCOSE SERPL-MCNC: 214 MG/DL (ref 65–140)
GLUCOSE SERPL-MCNC: 219 MG/DL (ref 65–140)
GLUCOSE SERPL-MCNC: 221 MG/DL (ref 65–140)
GLUCOSE SERPL-MCNC: 229 MG/DL (ref 65–140)
GLUCOSE SERPL-MCNC: 235 MG/DL (ref 65–140)
GLUCOSE SERPL-MCNC: 96 MG/DL (ref 65–140)
HCT VFR BLD AUTO: 33.9 % (ref 36.5–49.3)
HGB BLD-MCNC: 11.9 G/DL (ref 12–17)
MAGNESIUM SERPL-MCNC: 1.8 MG/DL (ref 1.6–2.6)
MAGNESIUM SERPL-MCNC: 2 MG/DL (ref 1.6–2.6)
MCH RBC QN AUTO: 29.7 PG (ref 26.8–34.3)
MCHC RBC AUTO-ENTMCNC: 35.1 G/DL (ref 31.4–37.4)
MCV RBC AUTO: 85 FL (ref 82–98)
P AXIS: 85 DEGREES
PHOSPHATE SERPL-MCNC: 2.2 MG/DL (ref 2.7–4.5)
PHOSPHATE SERPL-MCNC: 2.2 MG/DL (ref 2.7–4.5)
PLATELET # BLD AUTO: 301 THOUSANDS/UL (ref 149–390)
PMV BLD AUTO: 11 FL (ref 8.9–12.7)
POTASSIUM SERPL-SCNC: 3.1 MMOL/L (ref 3.5–5.3)
POTASSIUM SERPL-SCNC: 4 MMOL/L (ref 3.5–5.3)
PR INTERVAL: 186 MS
QRS AXIS: 89 DEGREES
QRSD INTERVAL: 102 MS
QT INTERVAL: 384 MS
QTC INTERVAL: 474 MS
RBC # BLD AUTO: 4.01 MILLION/UL (ref 3.88–5.62)
SODIUM SERPL-SCNC: 133 MMOL/L (ref 135–147)
SODIUM SERPL-SCNC: 139 MMOL/L (ref 135–147)
T WAVE AXIS: 58 DEGREES
VENTRICULAR RATE: 92 BPM
WBC # BLD AUTO: 9.47 THOUSAND/UL (ref 4.31–10.16)

## 2022-09-09 PROCEDURE — 83735 ASSAY OF MAGNESIUM: CPT | Performed by: PHYSICIAN ASSISTANT

## 2022-09-09 PROCEDURE — 82948 REAGENT STRIP/BLOOD GLUCOSE: CPT

## 2022-09-09 PROCEDURE — 99253 IP/OBS CNSLTJ NEW/EST LOW 45: CPT | Performed by: INTERNAL MEDICINE

## 2022-09-09 PROCEDURE — 84100 ASSAY OF PHOSPHORUS: CPT | Performed by: PHYSICIAN ASSISTANT

## 2022-09-09 PROCEDURE — 80048 BASIC METABOLIC PNL TOTAL CA: CPT | Performed by: INTERNAL MEDICINE

## 2022-09-09 PROCEDURE — 99232 SBSQ HOSP IP/OBS MODERATE 35: CPT | Performed by: INTERNAL MEDICINE

## 2022-09-09 PROCEDURE — 83735 ASSAY OF MAGNESIUM: CPT | Performed by: INTERNAL MEDICINE

## 2022-09-09 PROCEDURE — 93010 ELECTROCARDIOGRAM REPORT: CPT | Performed by: INTERNAL MEDICINE

## 2022-09-09 PROCEDURE — 84100 ASSAY OF PHOSPHORUS: CPT | Performed by: INTERNAL MEDICINE

## 2022-09-09 PROCEDURE — 85027 COMPLETE CBC AUTOMATED: CPT | Performed by: INTERNAL MEDICINE

## 2022-09-09 PROCEDURE — 80048 BASIC METABOLIC PNL TOTAL CA: CPT | Performed by: PHYSICIAN ASSISTANT

## 2022-09-09 RX ORDER — POLYETHYLENE GLYCOL 3350 17 G/17G
17 POWDER, FOR SOLUTION ORAL DAILY
Status: DISCONTINUED | OUTPATIENT
Start: 2022-09-09 | End: 2022-09-12 | Stop reason: HOSPADM

## 2022-09-09 RX ORDER — SENNOSIDES 8.6 MG
1 TABLET ORAL
Status: DISCONTINUED | OUTPATIENT
Start: 2022-09-09 | End: 2022-09-12 | Stop reason: HOSPADM

## 2022-09-09 RX ORDER — INSULIN LISPRO 100 [IU]/ML
5 INJECTION, SOLUTION INTRAVENOUS; SUBCUTANEOUS
Status: DISCONTINUED | OUTPATIENT
Start: 2022-09-10 | End: 2022-09-12 | Stop reason: HOSPADM

## 2022-09-09 RX ORDER — INSULIN LISPRO 100 [IU]/ML
1-6 INJECTION, SOLUTION INTRAVENOUS; SUBCUTANEOUS
Status: DISCONTINUED | OUTPATIENT
Start: 2022-09-09 | End: 2022-09-09

## 2022-09-09 RX ORDER — INSULIN LISPRO 100 [IU]/ML
1-6 INJECTION, SOLUTION INTRAVENOUS; SUBCUTANEOUS
Status: DISCONTINUED | OUTPATIENT
Start: 2022-09-10 | End: 2022-09-12 | Stop reason: HOSPADM

## 2022-09-09 RX ORDER — BISACODYL 10 MG
10 SUPPOSITORY, RECTAL RECTAL DAILY PRN
Status: DISCONTINUED | OUTPATIENT
Start: 2022-09-09 | End: 2022-09-12 | Stop reason: HOSPADM

## 2022-09-09 RX ORDER — INSULIN GLARGINE 100 [IU]/ML
25 INJECTION, SOLUTION SUBCUTANEOUS
Status: DISCONTINUED | OUTPATIENT
Start: 2022-09-09 | End: 2022-09-12 | Stop reason: HOSPADM

## 2022-09-09 RX ORDER — POTASSIUM CHLORIDE 20 MEQ/1
40 TABLET, EXTENDED RELEASE ORAL ONCE
Status: COMPLETED | OUTPATIENT
Start: 2022-09-09 | End: 2022-09-09

## 2022-09-09 RX ORDER — INSULIN LISPRO 100 [IU]/ML
5 INJECTION, SOLUTION INTRAVENOUS; SUBCUTANEOUS
Status: DISCONTINUED | OUTPATIENT
Start: 2022-09-09 | End: 2022-09-09

## 2022-09-09 RX ADMIN — NICOTINE 21 MG: 21 PATCH, EXTENDED RELEASE TRANSDERMAL at 08:25

## 2022-09-09 RX ADMIN — HEPARIN SODIUM 5000 UNITS: 5000 INJECTION INTRAVENOUS; SUBCUTANEOUS at 14:23

## 2022-09-09 RX ADMIN — LEVOTHYROXINE SODIUM 75 MCG: 75 TABLET ORAL at 06:17

## 2022-09-09 RX ADMIN — POTASSIUM CHLORIDE 40 MEQ: 20 TABLET, EXTENDED RELEASE ORAL at 06:18

## 2022-09-09 RX ADMIN — POLYETHYLENE GLYCOL 3350 17 G: 17 POWDER, FOR SOLUTION ORAL at 08:28

## 2022-09-09 RX ADMIN — INSULIN GLARGINE 25 UNITS: 100 INJECTION, SOLUTION SUBCUTANEOUS at 21:07

## 2022-09-09 RX ADMIN — HEPARIN SODIUM 5000 UNITS: 5000 INJECTION INTRAVENOUS; SUBCUTANEOUS at 06:17

## 2022-09-09 RX ADMIN — SODIUM CHLORIDE 5 UNITS/HR: 9 INJECTION, SOLUTION INTRAVENOUS at 11:18

## 2022-09-09 RX ADMIN — POTASSIUM PHOSPHATE, MONOBASIC POTASSIUM PHOSPHATE, DIBASIC: 224; 236 INJECTION, SOLUTION, CONCENTRATE INTRAVENOUS at 06:17

## 2022-09-09 RX ADMIN — HEPARIN SODIUM 5000 UNITS: 5000 INJECTION INTRAVENOUS; SUBCUTANEOUS at 21:07

## 2022-09-09 NOTE — PLAN OF CARE
Problem: Potential for Falls  Goal: Patient will remain free of falls  Description: INTERVENTIONS:  - Educate patient/family on patient safety including physical limitations  - Instruct patient to call for assistance with activity   - Consult OT/PT to assist with strengthening/mobility   - Keep Call bell within reach  - Keep bed low and locked with side rails adjusted as appropriate  - Keep care items and personal belongings within reach  - Initiate and maintain comfort rounds  - Make Fall Risk Sign visible to staff  - Offer Toileting every 5 Hours, in advance of need  - Initiate/Maintain alarm  - Obtain necessary fall risk management equipment:   Problem: Prexisting or High Potential for Compromised Skin Integrity  Goal: Skin integrity is maintained or improved  Description: INTERVENTIONS:  - Identify patients at risk for skin breakdown  - Assess and monitor skin integrity  - Assess and monitor nutrition and hydration status  - Monitor labs   - Assess for incontinence   - Turn and reposition patient  - Assist with mobility/ambulation  - Relieve pressure over bony prominences  - Avoid friction and shearing  - Provide appropriate hygiene as needed including keeping skin clean and dry  - Evaluate need for skin moisturizer/barrier cream  - Collaborate with interdisciplinary team   - Patient/family teaching  - Consider wound care consult   Outcome: Progressing     Problem: SAFETY ADULT  Goal: Patient will remain free of falls  Description: INTERVENTIONS:  - Educate patient/family on patient safety including physical limitations  - Instruct patient to call for assistance with activity   - Consult OT/PT to assist with strengthening/mobility   - Keep Call bell within reach  - Keep bed low and locked with side rails adjusted as appropriate  - Keep care items and personal belongings within reach  - Initiate and maintain comfort rounds  - Make Fall Risk Sign visible to staff  - Offer Toileting every  Hours, in advance of need  - Initiate/Maintain alarm  - Obtain necessary fall risk management equipment:   - Apply yellow socks and bracelet for high fall risk patients  - Consider moving patient to room near nurses station  Outcome: Progressing  Goal: Maintain or return to baseline ADL function  Description: INTERVENTIONS:  -  Assess patient's ability to carry out ADLs; assess patient's baseline for ADL function and identify physical deficits which impact ability to perform ADLs (bathing, care of mouth/teeth, toileting, grooming, dressing, etc )  - Assess/evaluate cause of self-care deficits   - Assess range of motion  - Assess patient's mobility; develop plan if impaired  - Assess patient's need for assistive devices and provide as appropriate  - Encourage maximum independence but intervene and supervise when necessary  - Involve family in performance of ADLs  - Assess for home care needs following discharge   - Consider OT consult to assist with ADL evaluation and planning for discharge  - Provide patient education as appropriate  Outcome: Progressing    - Apply yellow socks and bracelet for high fall risk patients  - Consider moving patient to room near nurses station  Outcome: Progressing

## 2022-09-09 NOTE — QUICK NOTE
Patient seen and evaluated by Critical Care today and deemed to be appropriate for transfer to Med-Surg   Spoke to Dr Cisco Lomas via TT from [facility] to accept patient transfer  Major changes to treatment plan from the day of transfer include cont [facility] insulin pending endo consult for new insulin reccs    Critical care can be contacted via [employer] with any questions or concerns

## 2022-09-09 NOTE — PROGRESS NOTES
[place]  Progress Note - Carlos Luna 1980, 36 y o  male MRN: [id number]  Unit/Bed#: -01 SDU Encounter: [id number]  Primary Care Provider: No primary care provider on file  Date and time admitted to hospital: 9/7/2022  7:46 PM    * Diabetic ketoacidosis without coma [facility])  Assessment & Plan  Lab Results   Component Value Date    HGBA1C 12 6 (H) 09/07/2022       Recent Labs     09/08/22  1615 09/08/22  1705 09/08/22  1758 09/08/22  1903   POCGLU 82 87 101 138       Blood Sugar Average: Last 72 hrs:  · (P) 194Pt with Hx of IDDM, reports he stopped taking his insulin 5 days PTA because he didn't want to take it  · Reports for for 3 days PTA, he had N/V, abd pain  Found to be in DKA  · Pertinent labs: Ph 6 86, serum bicarb <5, , AG unable to be calculated  · A1C: 12 6  · Received DKA fluid resuscitation in the ED, Also given 1 amp bicarb, 7 units regular insulin bolus and started on DKA insulin gtt  · Gap closed x2 and transitioned to non-CC insulin gtt  · q2 hour BG checks  · Transition to home insulin regimen today  · Start PO diet    Metabolic acidosis  Assessment & Plan  · Severe metabolic acidosis secondary to DKA for medication noncompliance  · Ph on admission 6 86, serum bicarb < 5  · Fluid resuscitated per DKA protocol started and will continue with aggressive fluid resuscitation  · Given 1 amp bicarb in the ED  · [place] placed for frequent lab draws  · Continue treatment for DKA as discussed above    Medically noncompliant  Assessment & Plan  · Reports not taking his home synthroid for over a month and hasn't had his insulin in 5 days  He reports he has these medications at home, he just wasn't taking them  · Will need further education when appropriate on the importance of medication compliance   · Pt also requesting a glucometer for home since he doesn't have one  Care management consulted       Hypothyroidism  Assessment & Plan  · Pt has not taken his home med for over a month  · TSH on admission 24 5  · Resume home levothyroxine    Type 1 diabetes mellitus with hyperglycemia [facility])  Assessment & Plan  Lab Results   Component Value Date    HGBA1C 12 6 (H) 09/07/2022       Recent Labs     09/08/22  1615 09/08/22  1705 09/08/22  1758 09/08/22  1903   POCGLU 82 87 101 138       Blood Sugar Average: Last 72 hrs:  · (P) 194Medication noncompliance at home, hasn't taken his insulin for 5 days PTA  · Admitted with DKA  · See plan above under DKA    BENJI (acute kidney injury) ([facility] [address] )  Assessment & Plan  · Baseline creat appears to be 0 6-0 8  · Admission creat 2 05  · Likely 2/2 intravascular dehydration in the setting of DKA   · Resolvedwith IVF administration   · UA neg for infection  · Strict I&O   · Avoid hypotension and nephrotoxic agents     Nicotine dependence  Assessment & Plan  · PAD smoker  · Encourage cessation   · Start NRT with patch    Hyponatremia  Assessment & Plan  · Pseudo-hyponatremia  · Na 124 on admission, Corrected for glucose it was 137      ----------------------------------------------------------------------------------------  HPI/24hr events: Transitioned to non-CC insulin gtt  Persistent acidosis and received 1L isolyte bolus  No acute events overnight  Patient appropriate for transfer out of the ICU today?: Patient does not meet criteria for referral to the ICU Follow-Up Clinic; referral has not been made  Disposition: Transfer to Stepdown Level 2  Code Status: Level 1 - Full Code  ---------------------------------------------------------------------------------------  SUBJECTIVE  Denies all complaints this morning  States that he slept well overnight and would like to eat and drink today  He is looking to see if he will be able to have insulin pens prescribed at discharge- discussed that we can see if his insurance will cover the pens when he is medically stable for discharge  Review of Systems   HENT: Negative for congestion      Respiratory: Negative for cough, shortness of breath and wheezing  Cardiovascular: Negative for chest pain and palpitations  Gastrointestinal: Negative for abdominal distention, abdominal pain, constipation, diarrhea, nausea and vomiting  Genitourinary: Negative for dysuria  Neurological: Negative for dizziness, weakness, light-headedness and headaches  All other systems reviewed and are negative  Review of systems was reviewed and negative unless stated above in HPI/24-hour events   ---------------------------------------------------------------------------------------  OBJECTIVE    Vitals   Vitals:    22 1535 22 1700 22 1911 22 0005   BP: 144/78  133/74 128/69   BP Location: Left arm  Right arm Left arm   Pulse: 86  78 81   Resp: (!) 23 20 21 (!) 26   Temp: 99 7 °F (37 6 °C)  98 6 °F (37 °C) 98 5 °F (36 9 °C)   TempSrc: Oral  Oral Oral   SpO2: 95%  95% 95%   Weight:       Height:         Temp (24hrs), Av 6 °F (37 °C), Min:97 6 °F (36 4 °C), Max:99 7 °F (37 6 °C)  Current: Temperature: 98 5 °F (36 9 °C)  Arterial Line BP: 140/57  Arterial Line MAP (mmHg): 77 mmHg    Respiratory:  SpO2: SpO2: 95 %       Invasive/non-invasive ventilation settings   Respiratory  Report   Lab Data (Last 4 hours)    None         O2/Vent Data (Last 4 hours)    None                Physical Exam  Vitals reviewed  Constitutional:       General: He is not in acute distress  Appearance: He is not ill-appearing, toxic-appearing or diaphoretic  HENT:      Head: Normocephalic and atraumatic  Nose: No congestion  Eyes:      Pupils: Pupils are equal, round, and reactive to light  Cardiovascular:      Rate and Rhythm: Normal rate and regular rhythm  Heart sounds: No murmur heard  No friction rub  No gallop  Pulmonary:      Effort: Pulmonary effort is normal       Breath sounds: No wheezing, rhonchi or rales  Abdominal:      General: Abdomen is flat  There is no distension        Palpations: Abdomen is soft  Tenderness: There is no abdominal tenderness  There is no guarding or rebound  Musculoskeletal:      Right lower leg: No edema  Left lower leg: No edema  Skin:     General: Skin is warm and dry  Capillary Refill: Capillary refill takes less than 2 seconds  Neurological:      General: No focal deficit present  Mental Status: He is alert and oriented to person, place, and time     Psychiatric:         Mood and Affect: Mood normal          Behavior: Behavior normal              Laboratory and Diagnostics:  Results from last 7 days   Lab Units 09/08/22  0350 09/07/22 2007 09/07/22 2004   WBC Thousand/uL 11 91*  --  15 77*   HEMOGLOBIN g/dL 11 9*  --  14 6   I STAT HEMOGLOBIN g/dl  --  16 7  --    HEMATOCRIT % 33 7*  --  47 4   HEMATOCRIT, ISTAT %  --  49  --    PLATELETS Thousands/uL 339  --  468*   NEUTROS PCT % 76*  --  83*   MONOS PCT % 7  --  5     Results from last 7 days   Lab Units 09/08/22  2115 09/08/22  1637 09/08/22  1303 09/08/22  0817 09/08/22  0350 09/08/22  0013 09/07/22  2329 09/07/22 2007 09/07/22 2004   SODIUM mmol/L 137 138 142 139 136  --  132*  --  124*   POTASSIUM mmol/L 3 5 3 8 2 6* 2 9* 3 1*  --  3 6  --  4 1   CHLORIDE mmol/L 106 107 115* 106 102  --  98  --  84*   CO2 mmol/L 20* 24 17* 19* 12*  --  6*  --  <5*   CO2, I-STAT mmol/L  --   --   --   --   --   --   --  <5*  --    ANION GAP mmol/L 11 7 10 14* 22*  --  28*  --   --    BUN mg/dL 7 9 10 14 20  --  25  --  28*   CREATININE mg/dL 0 78 0 87 0 70 1 18 1 19  --  1 36*  --  2 05*   CALCIUM mg/dL 6 9* 8 0* 5 7* 7 3* 7 3*  --  7 5*  --  8 8   GLUCOSE RANDOM mg/dL 204* 80 85 224* 237* 412* 480*  482*  --  897*   ALT U/L  --   --   --   --   --   --   --   --  29   AST U/L  --   --   --   --   --   --   --   --  15   ALK PHOS U/L  --   --   --   --   --   --   --   --  216*   ALBUMIN g/dL  --   --   --   --   --   --   --   --  3 2*   TOTAL BILIRUBIN mg/dL  --   --   --   --   --   --   --   -- 0 60     Results from last 7 days   Lab Units 09/08/22  2115 09/08/22  1637 09/08/22  1303 09/08/22  0817 09/08/22  0350 09/07/22  2329 09/07/22 2004   MAGNESIUM mg/dL 2 2 2 5 1 4* 2 2 2 3 2 0 2 5   PHOSPHORUS mg/dL 1 1* 1 0* <0 5* <0 5* 0 5* 3 4 8 4*                   ABG:  Results from last 7 days   Lab Units 09/08/22  0350   PH ART  7 349*   PCO2 ART mm Hg 18 2*   PO2 ART mm Hg 101 5   HCO3 ART mmol/L 9 8*   BASE EXC ART mmol/L -13 5   ABG SOURCE  Line, Arterial     VBG:  Results from last 7 days   Lab Units 09/08/22  0350   ABG SOURCE  Line, Arterial           Micro        EKG: NSR  Imaging: I have personally reviewed pertinent reports  Intake and Output  I/O       09/07 0701 09/08 0700 09/08 0701 09/09 0700    P  O   1320    I V  (mL/kg) 4355 1 (59 7) 4696 5 (64 3)    IV Piggyback 250 842 6    Total Intake(mL/kg) 4605 1 (63 1) 6859 (94)    Urine (mL/kg/hr) 1925 3375 (1 9)    Total Output 1925 3375    Net +2680 1 +3484                Height and Weights   Height: 5' 11" (180 3 cm)  IBW (Ideal Body Weight): 75 3 kg  Body mass index is 22 45 kg/m²  Weight (last 2 days)     Date/Time Weight    09/08/22 0535 73 (160 94)    09/07/22 2215 73 (160 94)    09/07/22 2059 71 6 (157 85)            Nutrition       Diet Orders   (From admission, onward)             Start     Ordered    09/07/22 2219  Diet NPO; Ice chips  Diet effective now        References:    Nutrtion Support Algorithm Enteral vs  Parenteral   Question Answer Comment   Diet Type NPO    NPO Except: Ice chips    RD to adjust diet per protocol?  Yes        09/07/22 2220                  Active Medications  Scheduled Meds:  Current Facility-Administered Medications   Medication Dose Route Frequency Provider Last Rate    heparin (porcine)  5,000 Units Subcutaneous [facility], CRNP      insulin regular (HumuLIN R,NovoLIN R) infusion  0 3-21 Units/hr Intravenous Titrated MELISSA Gardner 4 Units/hr (09/09/22 0005)    levothyroxine  75 mcg Oral Early Morning Keyanna Murdock, MELISSA      nicotine  21 mg Transdermal Daily Keyanna Murdock, [address]      ondansetron  4 mg Intravenous Q4H PRN Keyanna Murdock, CRNP      potassium phosphate  30 mmol Intravenous Once Addy Soares PA-C 30 mmol (09/08/22 2158)    potassium-sodium phosphates  2 packet Oral 4x Daily (with meals and at bedtime) Jose Contreras, FARHANANP      sodium chloride  10 mL/hr Intravenous Continuous Molinda Diane, CRNP 10 mL/hr (09/08/22 1759)     Continuous Infusions:  insulin regular (HumuLIN R,NovoLIN R) infusion, 0 3-21 Units/hr, Last Rate: 4 Units/hr (09/09/22 0005)  sodium chloride, 10 mL/hr, Last Rate: 10 mL/hr (09/08/22 1759)      PRN Meds:   ondansetron, 4 mg, Q4H PRN        Invasive Devices Review  Invasive Devices  Report    Peripheral Intravenous Line  Duration           Peripheral IV 09/07/22 Upper;Right Arm 1 day    Peripheral IV 09/08/22 Distal;Left;Ventral (anterior) Forearm <1 day          Arterial Line  Duration           Arterial Line 09/07/22 Radial 1 day                  ---------------------------------------------------------------------------------------  Advance Directive and Living Will:      Power of :    POLST:    ---------------------------------------------------------------------------------------  Care Time Delivered:   No Critical Care time spent       Cecilio Jefferson PA-C      Portions of the record may have been created with voice recognition software  Occasional wrong word or "sound a like" substitutions may have occurred due to the inherent limitations of voice recognition software    Read the chart carefully and recognize, using context, where substitutions have occurred

## 2022-09-09 NOTE — CONSULTS
Consultation - Alejandro Julian 36 y o  male MRN: [id number]    Unit/Bed#: -01 SDU Encounter: [id number]      Assessment/Plan     Assessment: This is a 36y o -year-old male with diabetes with hyperglycemia, dysphagia and DKA  Plan:  1  Diabetes with hyperglycemia-based on his presentation, he most likely has type 1 diabetes  Would check jose antonio antibodies, insulin antibodies, IA two antibodies and think a transporter antibodies  Would recommend transition to subcutaneous insulin with Lantus 25 units at bedtime and Humalog 5 units with meals  He should also be on algorithm three correctional insulin  Continue to monitor blood sugars over time make adjustments to the regimen if necessary  Discontinue IV insulin 1 hour after Lantus is given  2  Dysphagia-recommend GI consultation with possible EGD  3  DKA-this has resolved at this time  CC: Diabetes Consult    History of Present Illness     HPI: Alejandro Julian is a 36y o  year old male with type 1 diabetes for 6 years  He is on insulin at home  He denies any polyuria, polydipsia, nocturia and blurry vision  He denies neuropathy, nephropathy, retinopathy, heart attack, stroke and claudication but does admit to none  He denies any hypoglycemia  He tells me that he stopped eating and therefore stopped taking his insulin a few days ago  He states that he has difficulty swallowing  Inpatient consult to Endocrinology  Consult performed by: Khalida Guadalupe MD  Consult ordered by: MELISSA Chawla          Review of Systems   Constitutional: Negative for chills and fever  Respiratory: Negative for shortness of breath  Cardiovascular: Negative for chest pain  Gastrointestinal: Negative for constipation, diarrhea, nausea and vomiting  Difficulty swallowing  All other systems reviewed and are negative        Historical Information   Past Medical History:   Diagnosis Date    Diabetes mellitus ([facility] [address] )     Disease of thyroid gland History reviewed  No pertinent surgical history  Social History   Social History     Substance and Sexual Activity   Alcohol Use Not Currently    Comment: ocassionally     Social History     Substance and Sexual Activity   Drug Use Yes    Types: Methamphetamines, Heroin    Comment: daily, meth last used 1 week ago, heroin last use 5/14     Social History     Tobacco Use   Smoking Status Current Every Day Smoker    Packs/day: 1 00    Years: 20 00    Pack years: 20 00    Types: Cigarettes   Smokeless Tobacco Never Used     Family History: History reviewed  No pertinent family history  Meds/Allergies   Current Facility-Administered Medications   Medication Dose Route Frequency Provider Last Rate Last Admin    bisacodyl (DULCOLAX) rectal suppository 10 mg  10 mg Rectal Daily PRN MELISSA Dillon        heparin (porcine) subcutaneous injection 5,000 Units  5,000 Units Subcutaneous [facility] MELISSA Dillon   5,000 Units at 09/09/22 0617    insulin regular (HumuLIN R,NovoLIN R) 1 Units/mL in sodium chloride 0 9 % 100 mL infusion  0 3-21 Units/hr Intravenous Titrated MELISSA Dillon 1 5 mL/hr at 09/09/22 1206 1 5 Units/hr at 09/09/22 1206    levothyroxine tablet 75 mcg  75 mcg Oral Early Morning MELISSA Dillon   75 mcg at 09/09/22 0617    nicotine (NICODERM CQ) 21 mg/24 hr TD 24 hr patch 21 mg  21 mg Transdermal Daily MELISSA Dillon   21 mg at 09/09/22 0825    polyethylene glycol (MIRALAX) packet 17 g  17 g Oral Daily MELISSA Dillon   17 g at 09/09/22 0828    senna (SENOKOT) tablet 8 6 mg  1 tablet Oral HS MELISSA Dillon        sodium chloride 0 9 % infusion  10 mL/hr Intravenous Continuous MELISSA Dillon 10 mL/hr at 09/08/22 1759 10 mL/hr at 09/08/22 1759     No Known Allergies    Objective   Vitals: Blood pressure 130/83, pulse 77, temperature 98 4 °F (36 9 °C), temperature source Oral, resp  rate 16, height 5' 11" (1 803 m), weight 73 kg (160 lb 15 oz), SpO2 94 %      Intake/Output Summary (Last 24 hours) at 9/9/2022 1349  Last data filed at 9/9/2022 1141  Gross per 24 hour   Intake 5387 2 ml   Output 3875 ml   Net 1512 2 ml     Invasive Devices  Report    Peripheral Intravenous Line  Duration           Peripheral IV 09/07/22 Upper;Right Arm 1 day    Peripheral IV 09/08/22 Distal;Left;Ventral (anterior) Forearm <1 day                Physical Exam  Vitals reviewed  Constitutional:       General: He is not in acute distress  Appearance: He is well-developed  He is not diaphoretic  HENT:      Head: Normocephalic and atraumatic  Mouth/Throat:      Pharynx: No oropharyngeal exudate  Eyes:      General: Lids are normal  No scleral icterus  Right eye: No discharge  Left eye: No discharge  Conjunctiva/sclera: Conjunctivae normal    Neck:      Thyroid: No thyromegaly  Cardiovascular:      Rate and Rhythm: Normal rate and regular rhythm  Heart sounds: Normal heart sounds  No murmur heard  No friction rub  No gallop  Pulmonary:      Effort: Pulmonary effort is normal  No respiratory distress  Breath sounds: Normal breath sounds  No wheezing  Chest:   Breasts:      Right: No supraclavicular adenopathy  Left: No supraclavicular adenopathy  Abdominal:      General: Bowel sounds are normal  There is no distension  Palpations: Abdomen is soft  Tenderness: There is no abdominal tenderness  Musculoskeletal:         General: No tenderness or deformity  Normal range of motion  Cervical back: Neck supple  Lymphadenopathy:      Head:      Right side of head: No occipital adenopathy  Left side of head: No occipital adenopathy  Upper Body:      Right upper body: No supraclavicular adenopathy  Left upper body: No supraclavicular adenopathy  Skin:     General: Skin is warm  Findings: No erythema or rash  Neurological:      Mental Status: He is alert and oriented to person, place, and time  Cranial Nerves:  No cranial nerve deficit  Coordination: Coordination normal    Psychiatric:         Mood and Affect: Mood normal          Behavior: Behavior normal          The history was obtained from the review of the chart, patient  Lab Results:   Results from last 7 days   Lab Units 09/07/22 2004   HEMOGLOBIN A1C % 12 6*     Lab Results   Component Value Date    WBC 9 47 09/09/2022    HGB 11 9 (L) 09/09/2022    HCT 33 9 (L) 09/09/2022    MCV 85 09/09/2022     09/09/2022     Lab Results   Component Value Date/Time    BUN 6 09/09/2022 04:03 AM    K 3 1 (L) 09/09/2022 04:03 AM     09/09/2022 04:03 AM    CO2 22 09/09/2022 04:03 AM    CO2 <5 (LL) 09/07/2022 08:07 PM    CREATININE 0 71 09/09/2022 04:03 AM    AST 15 09/07/2022 08:04 PM    ALT 29 09/07/2022 08:04 PM    ALB 3 2 (L) 09/07/2022 08:04 PM     No results for input(s): CHOL, HDL, LDL, TRIG, VLDL in the last 72 hours  No results found for: [employer]  POC Glucose (mg/dl)   Date Value   09/09/2022 129   09/09/2022 210 (H)   09/09/2022 235 (H)   09/09/2022 101   09/09/2022 119   09/09/2022 122   09/09/2022 197 (H)   09/08/2022 233 (H)   09/08/2022 166 (H)   09/08/2022 138       Imaging Studies: I have personally reviewed pertinent reports  Portions of the record may have been created with voice recognition software

## 2022-09-09 NOTE — QUICK NOTE
Spoke to patient regarding his insulin regimen  He states [place], he received long-acting insulin BID with mealtime SSI  Prior to that, he reports administering 70/30 40U BID with no mealtime coverage  He believes he will need a new prescription and that his old insulin and needles are likely   He states his pharmacy is [facility] in [place]  Consult placed to Endocrine Dr Elaine Nelson and conveyed through TT for ongoing insulin regimen reccs

## 2022-09-09 NOTE — PLAN OF CARE
Problem: Potential for Falls  Goal: Patient will remain free of falls  Description: INTERVENTIONS:  - Educate patient/family on patient safety including physical limitations  - Instruct patient to call for assistance with activity   - Consult OT/PT to assist with strengthening/mobility   - Keep Call bell within reach  - Keep bed low and locked with side rails adjusted as appropriate  - Keep care items and personal belongings within reach  - Initiate and maintain comfort rounds  - Make Fall Risk Sign visible to staff  - Offer Toileting every 2 Hours, in advance of need  - Initiate/Maintain bed/chairalarm  - Apply yellow socks and bracelet for high fall risk patients  - Consider moving patient to room near nurses station  Outcome: Progressing     Problem: Nutrition/Hydration-ADULT  Goal: Nutrient/Hydration intake appropriate for improving, restoring or maintaining nutritional needs  Description: Monitor and assess patient's nutrition/hydration status for malnutrition  Collaborate with interdisciplinary team and initiate plan and interventions as ordered  Monitor patient's weight and dietary intake as ordered or per policy  Utilize nutrition screening tool and intervene as necessary  Determine patient's food preferences and provide high-protein, high-caloric foods as appropriate       INTERVENTIONS:  - Monitor oral intake, urinary output, labs, and treatment plans  - Assess nutrition and hydration status and recommend course of action  - Evaluate amount of meals eaten  - Assist patient with eating if necessary   - Allow adequate time for meals  - Recommend/ encourage appropriate diets, oral nutritional supplements, and vitamin/mineral supplements  - Order, calculate, and assess calorie counts as needed  - Recommend, monitor, and adjust tube feedings and TPN/PPN based on assessed needs  - Assess need for intravenous fluids  - Provide specific nutrition/hydration education as appropriate  - Include patient/family/caregiver in decisions related to nutrition  Outcome: Progressing     Problem: MOBILITY - ADULT  Goal: Maintain or return to baseline ADL function  Description: INTERVENTIONS:  -  Assess patient's ability to carry out ADLs; assess patient's baseline for ADL function and identify physical deficits which impact ability to perform ADLs (bathing, care of mouth/teeth, toileting, grooming, dressing, etc )  - Assess/evaluate cause of self-care deficits   - Assess range of motion  - Assess patient's mobility; develop plan if impaired  - Assess patient's need for assistive devices and provide as appropriate  - Encourage maximum independence but intervene and supervise when necessary  - Involve family in performance of ADLs  - Assess for home care needs following discharge   - Consider OT consult to assist with ADL evaluation and planning for discharge  - Provide patient education as appropriate  Outcome: Progressing  Goal: Maintains/Returns to pre admission functional level  Description: INTERVENTIONS:  - Perform BMAT or MOVE assessment daily    - Set and communicate daily mobility goal to care team and patient/family/caregiver  - Collaborate with rehabilitation services on mobility goals if consulted  -- Reposition patient every 2 hours    - Dangle patient 2 times a day  - Stand patient 2 times a day  - Ambulate patient 2 times a day  - Out of bed to chair 2 times a day   - Out of bed for meals 2 times a day  - Out of bed for toileting  - Record patient progress and toleration of activity level   Outcome: Progressing     Problem: Prexisting or High Potential for Compromised Skin Integrity  Goal: Skin integrity is maintained or improved  Description: INTERVENTIONS:  - Identify patients at risk for skin breakdown  - Assess and monitor skin integrity  - Assess and monitor nutrition and hydration status  - Monitor labs   - Assess for incontinence   - Turn and reposition patient  - Assist with mobility/ambulation  - Relieve pressure over bony prominences  - Avoid friction and shearing  - Provide appropriate hygiene as needed including keeping skin clean and dry  - Evaluate need for skin moisturizer/barrier cream  - Collaborate with interdisciplinary team   - Patient/family teaching  - Consider wound care consult   Outcome: Progressing     Problem: PAIN - ADULT  Goal: Verbalizes/displays adequate comfort level or baseline comfort level  Description: Interventions:  - Encourage patient to monitor pain and request assistance  - Assess pain using appropriate pain scale  - Administer analgesics based on type and severity of pain and evaluate response  - Implement non-pharmacological measures as appropriate and evaluate response  - Consider cultural and social influences on pain and pain management  - Notify physician/advanced practitioner if interventions unsuccessful or patient reports new pain  Outcome: Progressing     Problem: INFECTION - ADULT  Goal: Absence or prevention of progression during hospitalization  Description: INTERVENTIONS:  - Assess and monitor for signs and symptoms of infection  - Monitor lab/diagnostic results  - Monitor all insertion sites, i e  indwelling lines, tubes, and drains  - Monitor endotracheal if appropriate and nasal secretions for changes in amount and color  - [place] appropriate cooling/warming therapies per order  - Administer medications as ordered  - Instruct and encourage patient and family to use good hand hygiene technique  - Identify and instruct in appropriate isolation precautions for identified infection/condition  Outcome: Progressing  Goal: Absence of fever/infection during neutropenic period  Description: INTERVENTIONS:  - Monitor WBC    Outcome: Completed

## 2022-09-09 NOTE — QUICK NOTE
Informed by Endocrine and the patient that he feels a lump in his throat, which is hindering his meal consumption, and thus his insulin administration at home since before his presentation to the ER  Will order GI consult for evaluation of dysphagia  Appreciate speech consult

## 2022-09-09 NOTE — QUICK NOTE
Call placed to patient's Shahid Melanychung for updates  She is aware that he is transferred to [facility] services and is awaiting endo consult  She had no further questions and was appreciative of the update

## 2022-09-10 LAB
ANION GAP SERPL CALCULATED.3IONS-SCNC: 8 MMOL/L (ref 4–13)
BASOPHILS # BLD AUTO: 0.07 THOUSANDS/ΜL (ref 0–0.1)
BASOPHILS NFR BLD AUTO: 1 % (ref 0–1)
BUN SERPL-MCNC: 5 MG/DL (ref 5–25)
CALCIUM SERPL-MCNC: 8.4 MG/DL (ref 8.3–10.1)
CHLORIDE SERPL-SCNC: 99 MMOL/L (ref 96–108)
CO2 SERPL-SCNC: 28 MMOL/L (ref 21–32)
CREAT SERPL-MCNC: 0.7 MG/DL (ref 0.6–1.3)
EOSINOPHIL # BLD AUTO: 0.16 THOUSAND/ΜL (ref 0–0.61)
EOSINOPHIL NFR BLD AUTO: 2 % (ref 0–6)
ERYTHROCYTE [DISTWIDTH] IN BLOOD BY AUTOMATED COUNT: 14.8 % (ref 11.6–15.1)
GFR SERPL CREATININE-BSD FRML MDRD: 115 ML/MIN/1.73SQ M
GLUCOSE SERPL-MCNC: 168 MG/DL (ref 65–140)
GLUCOSE SERPL-MCNC: 190 MG/DL (ref 65–140)
GLUCOSE SERPL-MCNC: 192 MG/DL (ref 65–140)
GLUCOSE SERPL-MCNC: 193 MG/DL (ref 65–140)
GLUCOSE SERPL-MCNC: 198 MG/DL (ref 65–140)
GLUCOSE SERPL-MCNC: 253 MG/DL (ref 65–140)
HCT VFR BLD AUTO: 37.2 % (ref 36.5–49.3)
HGB BLD-MCNC: 13 G/DL (ref 12–17)
IMM GRANULOCYTES # BLD AUTO: 0.04 THOUSAND/UL (ref 0–0.2)
IMM GRANULOCYTES NFR BLD AUTO: 1 % (ref 0–2)
LYMPHOCYTES # BLD AUTO: 3.51 THOUSANDS/ΜL (ref 0.6–4.47)
LYMPHOCYTES NFR BLD AUTO: 43 % (ref 14–44)
MAGNESIUM SERPL-MCNC: 2.6 MG/DL (ref 1.6–2.6)
MCH RBC QN AUTO: 30.3 PG (ref 26.8–34.3)
MCHC RBC AUTO-ENTMCNC: 34.9 G/DL (ref 31.4–37.4)
MCV RBC AUTO: 87 FL (ref 82–98)
MONOCYTES # BLD AUTO: 0.67 THOUSAND/ΜL (ref 0.17–1.22)
MONOCYTES NFR BLD AUTO: 9 % (ref 4–12)
NEUTROPHILS # BLD AUTO: 3.43 THOUSANDS/ΜL (ref 1.85–7.62)
NEUTS SEG NFR BLD AUTO: 44 % (ref 43–75)
NRBC BLD AUTO-RTO: 0 /100 WBCS
PHOSPHATE SERPL-MCNC: 3 MG/DL (ref 2.7–4.5)
PLATELET # BLD AUTO: 254 THOUSANDS/UL (ref 149–390)
PMV BLD AUTO: 10.5 FL (ref 8.9–12.7)
POTASSIUM SERPL-SCNC: 3.6 MMOL/L (ref 3.5–5.3)
RBC # BLD AUTO: 4.29 MILLION/UL (ref 3.88–5.62)
SODIUM SERPL-SCNC: 135 MMOL/L (ref 135–147)
WBC # BLD AUTO: 7.88 THOUSAND/UL (ref 4.31–10.16)

## 2022-09-10 PROCEDURE — 80048 BASIC METABOLIC PNL TOTAL CA: CPT | Performed by: NURSE PRACTITIONER

## 2022-09-10 PROCEDURE — 82948 REAGENT STRIP/BLOOD GLUCOSE: CPT

## 2022-09-10 PROCEDURE — 99222 1ST HOSP IP/OBS MODERATE 55: CPT | Performed by: INTERNAL MEDICINE

## 2022-09-10 PROCEDURE — 84100 ASSAY OF PHOSPHORUS: CPT | Performed by: NURSE PRACTITIONER

## 2022-09-10 PROCEDURE — 83735 ASSAY OF MAGNESIUM: CPT | Performed by: NURSE PRACTITIONER

## 2022-09-10 PROCEDURE — 99232 SBSQ HOSP IP/OBS MODERATE 35: CPT | Performed by: HOSPITALIST

## 2022-09-10 PROCEDURE — 92610 EVALUATE SWALLOWING FUNCTION: CPT

## 2022-09-10 PROCEDURE — 85025 COMPLETE CBC W/AUTO DIFF WBC: CPT | Performed by: NURSE PRACTITIONER

## 2022-09-10 RX ORDER — PANTOPRAZOLE SODIUM 40 MG/1
40 TABLET, DELAYED RELEASE ORAL
Status: DISCONTINUED | OUTPATIENT
Start: 2022-09-10 | End: 2022-09-12 | Stop reason: HOSPADM

## 2022-09-10 RX ORDER — ACETAMINOPHEN 325 MG/1
650 TABLET ORAL EVERY 6 HOURS PRN
Status: DISCONTINUED | OUTPATIENT
Start: 2022-09-10 | End: 2022-09-12 | Stop reason: HOSPADM

## 2022-09-10 RX ORDER — MAGNESIUM SULFATE HEPTAHYDRATE 40 MG/ML
2 INJECTION, SOLUTION INTRAVENOUS ONCE
Status: COMPLETED | OUTPATIENT
Start: 2022-09-10 | End: 2022-09-10

## 2022-09-10 RX ADMIN — INSULIN LISPRO 5 UNITS: 100 INJECTION, SOLUTION INTRAVENOUS; SUBCUTANEOUS at 16:11

## 2022-09-10 RX ADMIN — INSULIN LISPRO 2 UNITS: 100 INJECTION, SOLUTION INTRAVENOUS; SUBCUTANEOUS at 07:15

## 2022-09-10 RX ADMIN — HEPARIN SODIUM 5000 UNITS: 5000 INJECTION INTRAVENOUS; SUBCUTANEOUS at 05:55

## 2022-09-10 RX ADMIN — INSULIN LISPRO 2 UNITS: 100 INJECTION, SOLUTION INTRAVENOUS; SUBCUTANEOUS at 21:46

## 2022-09-10 RX ADMIN — NICOTINE 21 MG: 21 PATCH, EXTENDED RELEASE TRANSDERMAL at 08:44

## 2022-09-10 RX ADMIN — SENNOSIDES 8.6 MG: 8.6 TABLET, FILM COATED ORAL at 21:46

## 2022-09-10 RX ADMIN — INSULIN LISPRO 3 UNITS: 100 INJECTION, SOLUTION INTRAVENOUS; SUBCUTANEOUS at 16:11

## 2022-09-10 RX ADMIN — PANTOPRAZOLE SODIUM 40 MG: 40 TABLET, DELAYED RELEASE ORAL at 13:53

## 2022-09-10 RX ADMIN — ACETAMINOPHEN 325MG 650 MG: 325 TABLET ORAL at 00:38

## 2022-09-10 RX ADMIN — INSULIN LISPRO 5 UNITS: 100 INJECTION, SOLUTION INTRAVENOUS; SUBCUTANEOUS at 11:25

## 2022-09-10 RX ADMIN — INSULIN LISPRO 5 UNITS: 100 INJECTION, SOLUTION INTRAVENOUS; SUBCUTANEOUS at 07:15

## 2022-09-10 RX ADMIN — POLYETHYLENE GLYCOL 3350 17 G: 17 POWDER, FOR SOLUTION ORAL at 08:43

## 2022-09-10 RX ADMIN — MAGNESIUM SULFATE HEPTAHYDRATE 2 G: 2 INJECTION, SOLUTION INTRAVENOUS at 03:04

## 2022-09-10 RX ADMIN — INSULIN GLARGINE 25 UNITS: 100 INJECTION, SOLUTION SUBCUTANEOUS at 21:46

## 2022-09-10 RX ADMIN — ACETAMINOPHEN 325MG 650 MG: 325 TABLET ORAL at 07:18

## 2022-09-10 RX ADMIN — LEVOTHYROXINE SODIUM 75 MCG: 75 TABLET ORAL at 05:55

## 2022-09-10 RX ADMIN — HEPARIN SODIUM 5000 UNITS: 5000 INJECTION INTRAVENOUS; SUBCUTANEOUS at 13:53

## 2022-09-10 RX ADMIN — HEPARIN SODIUM 5000 UNITS: 5000 INJECTION INTRAVENOUS; SUBCUTANEOUS at 21:46

## 2022-09-10 RX ADMIN — INSULIN LISPRO 2 UNITS: 100 INJECTION, SOLUTION INTRAVENOUS; SUBCUTANEOUS at 11:25

## 2022-09-10 NOTE — SPEECH THERAPY NOTE
Speech Language/Pathology  Speech/Language Pathology  Assessment    Patient Name: Glorious Carrel  NOQYC'M Date: 9/10/2022     Problem List  Principal Problem:    Diabetic ketoacidosis without coma ([facility] [address] )  Active Problems:    Type 1 diabetes mellitus with hyperglycemia (HCC)    Hyponatremia    Nicotine dependence    Hypothyroidism    Medically noncompliant    Metabolic acidosis    BENJI (acute kidney injury) [facility])    Past Medical History  Past Medical History:   Diagnosis Date    Diabetes mellitus ([facility] )     Disease of thyroid gland      Past Surgical History  History reviewed  No pertinent surgical history  Bedside Swallow Evaluation:    Summary:  Pt presented w/ functional oral and suspected functional pharyngeal swallow skill w/ materials presented today  Pt seen w/ trials of regular solids + thin liquids via cup + straw  Oral manipulation and transfer of bolus was timely and adequate  Swallows appear timely; hyo-laryngeal excursion palpated and suspected to be WNL  Pt reported pain while swallowing x1 w/ initial swallow of thin liquids; though denied pain with all subsequent trials of PO  No overt s/s aspiration observed across trials  Education provided to pt on s/s aspiration to monitor and notify medical team of should they arise; pt verbalized understanding  Recommendations:  Diet: Regular  Liquid: Thin  Meds: whole w/ thin  Supervision: distant  Positioning:Upright  Strategies: upright posture, small sips/bites, alternate bites + sips  Pt to take PO/Meds only when fully alert and upright  Oral care: 3-4x/daily  Therapy Prognosis: fair  Prognosis considerations: age, current medical, past medical  Frequency: ST to f/u as able + appropriate    Consider consult w/: none    Goal(s):  Pt will tolerate least restrictive diet w/out s/s aspiration or oral/pharyngeal difficulties       H&P/Admit info/ pertinent provider notes: (PMH noted above)  36year old male with history of insulin dependent diabetes mellitus and hypothyroidism presents for evaluation of abdominal pain, nausea and vomiting for the past 3 days  Patient states he has not given himself insulin in at least 1 week and has not been checking his blood glucose levels as he does not have a glucometer  He states he has not taken his levothyroxine in more than a month as he had run out of this medication  History of HHNK in the past  History of polysubstance abuse  States he is only using cannabis currently  Special Studies: XR chest port 9/7/22: IMPRESSION:     No acute cardiopulmonary disease  Previous VBS: none    Patient's goal: "It only hurts the first time I swallow "    Reason for consult:  R/o aspiration  Determine safest and least restrictive diet  C/o solid food dysphagia    Precautions: none    Food allergies:none    Current diet: regular/thin    Premorbid diet::regular/thin    O2 requirement: on RA    Voice/Speech: clear; adequate    Social/Prior living environment: lives w/ friend    Follows commands: wfl                          Cognitive Status: awake; alert    Oral [facility] exam:  Dentition: adequate; missing some teeth  Lips (VII): wfl  Tongue (XII): wfl  Mandible (V): wfl  Face/oral sensation (V): wfl  Velum (X): unable to visualize  Secretion management: wfl  Volitional cough: strong  Volitional swallow: wfl    Items administered:  Regular solid, thin liquids   Liquids were taken by straw + cup    Oral stage:  Lip closure: wfl  Mastication: wfl  Bolus formation: wfl  Bolus control: wfl  Transfer: wfl  Oral residue: none  Pocketing: none    Pharyngeal stage:  Swallow promptness: suspected timely  Laryngeal rise: suspected WNL  Wet voice: none  Throat clear: none  Cough: none  Secondary swallows: none  Audible swallows: none  No overt s/s aspiration    Esophageal stage:  No s/s reported    Results d/w:  Pt, nursing

## 2022-09-10 NOTE — PROGRESS NOTES
[place]  Progress Note - Carlos Luna 1980, 36 y o  male MRN: [id number]  Unit/Bed#: -01 SDU Encounter: [id number]  Primary Care Provider: No primary care provider on file  Date and time admitted to hospital: 9/7/2022  7:46 PM      * Diabetic ketoacidosis without coma [facility])  Assessment & Plan        Lab Results   Component Value Date     HGBA1C 12 6 (H) 09/07/2022               Blood Sugar Average: Last 72 hrs:  ·  Pt with Hx of IDDM, reports he stopped taking his insulin 5 days PTA because he didn't want to take it  · Reports for for 3 days PTA, he had N/V, abd pain  Found to be in DKA  · Pertinent labs: Ph 6 86, serum bicarb <5, , AG unable to be calculated  · A1C: 12 6  · Received DKA fluid resuscitation in the ED, Also given 1 amp bicarb, 7 units regular insulin bolus and started on DKA insulin gtt  ? Gap closed x2 and transitioned to non-CC insulin gtt  · Transitioned to basal bolus regimen     Metabolic acidosis  Assessment & Plan  · Severe metabolic acidosis secondary to DKA for medication noncompliance  · Ph on admission 6 86, serum bicarb < 5  · Fluid resuscitated per DKA protocol started and will continue with aggressive fluid resuscitation  · Given 1 amp bicarb in the ED  · resolved     Medically noncompliant  Assessment & Plan  · Reports not taking his home synthroid for over a month and hasn't had his insulin in 5 days  He reports he has these medications at home, he just wasn't taking them  · Will need further education when appropriate on the importance of medication compliance   · Pt also requesting a glucometer for home since he doesn't have one   Care management consulted       Hypothyroidism  Assessment & Plan  · Pt has not taken his home med for over a month  · TSH on admission 24 5  · Resume home levothyroxine     Type 1 diabetes mellitus with hyperglycemia [facility])  Assessment & Plan        Lab Results   Component Value Date     HGBA1C 12 6 (H) 2022            Blood Sugar Average: Last 72 hrs:  ·  Medication noncompliance at home, hasn't taken his insulin for 5 days PTA  · Admitted with DKA  · See plan above under DKA     BENJI (acute kidney injury) ([facility] [address] )  Assessment & Plan  · Baseline creat appears to be 0 6-0 8  · Admission creat 2 05  · Likely 2/2 intravascular dehydration in the setting of DKA   · Resolvedwith IVF administration   · UA neg for infection  · Strict I&O   · Avoid hypotension and nephrotoxic agents      Nicotine dependence  Assessment & Plan  · PAD smoker  · Encourage cessation   · Start NRT with patch     Hyponatremia  Assessment & Plan  · Pseudo-hyponatremia  · Na 124 on admission, Corrected for glucose it was 137    Dysphagia  apprec GI   Started on PPI  For barium swallow and possible EGD  VTE  Prophylaxis:   Pharmacologic: in place    Patient Centered Rounds: I have performed bedside rounds with nursing staff today  Discussions with Specialists or Other Care Team Provider: case management    Education and Discussions with Family / Patient: pt      Current Length of Stay: 3 day(s)    Current Patient Status: Inpatient        Code Status: Level 1 - Full Code      Subjective:   Pt seen  Describes such issues of dysphagia  Limited appetite     Patient is seen and examined at bedside  All other ROS are negative  Objective:     Vitals:   Temp (24hrs), Av 5 °F (36 9 °C), Min:98 1 °F (36 7 °C), Max:98 9 °F (37 2 °C)    Temp:  [98 1 °F (36 7 °C)-98 9 °F (37 2 °C)] 98 1 °F (36 7 °C)  HR:  [63-76] 63  Resp:  [20-25] 20  BP: (119-133)/(72-85) 119/82  SpO2:  [93 %-94 %] 93 %  Body mass index is 22 97 kg/m²  Input and Output Summary (last 24 hours):        Intake/Output Summary (Last 24 hours) at 9/10/2022 1319  Last data filed at 9/10/2022 0801  Gross per 24 hour   Intake 1374 26 ml   Output 1850 ml   Net -475 74 ml       Physical Exam:       GEN: No acute distress, comfortable  HEEENT: No JVD, PERRLA, no scleral icterus  RESP: Lungs clear to auscultation bilaterally  CV: RRR, +s1/s2   ABD: SOFT NON TENDER, POSITIVE BOWEL SOUNDS, NO DISTENTION  PSYCH: CALM  NEURO: A X O X 3, NO FOCAL DEFICITS  SKIN: NO RASH  EXTREM: NO EDEMA    Additional Data:     Labs:    Results from last 7 days   Lab Units 09/10/22  0549   WBC Thousand/uL 7 88   HEMOGLOBIN g/dL 13 0   HEMATOCRIT % 37 2   PLATELETS Thousands/uL 254   NEUTROS PCT % 44   LYMPHS PCT % 43   MONOS PCT % 9   EOS PCT % 2     Results from last 7 days   Lab Units 09/10/22  0549 09/07/22 2007 09/07/22 2004   SODIUM mmol/L 135   < > 124*   POTASSIUM mmol/L 3 6   < > 4 1   CHLORIDE mmol/L 99   < > 84*   CO2 mmol/L 28   < > <5*   CO2, I-STAT   --    < >  --    BUN mg/dL 5   < > 28*   CREATININE mg/dL 0 70   < > 2 05*   ANION GAP mmol/L 8   < >  --    CALCIUM mg/dL 8 4   < > 8 8   ALBUMIN g/dL  --   --  3 2*   TOTAL BILIRUBIN mg/dL  --   --  0 60   ALK PHOS U/L  --   --  216*   ALT U/L  --   --  29   AST U/L  --   --  15   GLUCOSE RANDOM mg/dL 190*   < > 897*    < > = values in this interval not displayed  Results from last 7 days   Lab Units 09/10/22  1110 09/10/22  0712 09/10/22  0257 09/09/22  2019 09/09/22  1801 09/09/22  1554 09/09/22  1405 09/09/22  1205 09/09/22  1014 09/09/22  0813 09/09/22  0616 09/09/22  0401   POC GLUCOSE mg/dl 192* 198* 168* 221* 219* 144* 214* 129 210* 235* 101 119     Results from last 7 days   Lab Units 09/07/22 2004   HEMOGLOBIN A1C % 12 6*               * I Have Reviewed All Lab Data Listed Above  Imaging:     Results for orders placed during the hospital encounter of 09/07/22    XR chest 1 view portable    Narrative  CHEST    INDICATION:   shortness of breath  COMPARISON:  5/14/2020    EXAM PERFORMED/VIEWS:  XR CHEST PORTABLE      FINDINGS:    Cardiomediastinal silhouette appears unremarkable  The lungs are clear  No pneumothorax or pleural effusion  Osseous structures appear within normal limits for patient age    Right cervical rib again noted  Impression  No acute cardiopulmonary disease  Workstation performed: [id number]    Results for orders placed during the hospital encounter of 05/14/20    XR chest 2 views    Narrative  CHEST    INDICATION:   chest pain  COMPARISON:  Chest radiograph from 5/6/2020  EXAM PERFORMED/VIEWS:  XR CHEST PA & LATERAL  DUAL ENERGY SUBTRACTION TECHNIQUE      FINDINGS:    Cardiomediastinal silhouette appears unremarkable  The lungs are clear  No pneumothorax or pleural effusion  Osseous structures appear within normal limits for patient age  Right cervical rib, a normal variant  Impression  No acute cardiopulmonary disease          Workstation performed: [id number]      *I have reviewed all imaging reports listed above      Recent Cultures (last 7 days):           Last 24 Hours Medication List:   Current Facility-Administered Medications   Medication Dose Route Frequency Provider Last Rate    acetaminophen  650 mg Oral Q6H PRN Nargis Arredondo PA-C      bisacodyl  10 mg Rectal Daily PRN Gregory Groves, MELISSA      heparin (porcine)  5,000 Units Subcutaneous [facility] Gregory Spearing, CRNP      insulin glargine  25 Units Subcutaneous HS Gregory Spearing, CRNP      insulin lispro  1-6 Units Subcutaneous TID [facility] Gregory Spearing, CRNP      insulin lispro  1-6 Units Subcutaneous HS Gregory Spearing, CRNP      insulin lispro  5 Units Subcutaneous Daily With Breakfast Gregory Magalieing, CRNP      insulin lispro  5 Units Subcutaneous Daily With [address], CRNP      insulin lispro  5 Units Subcutaneous Daily With Lunch Audrey Palmer DO      levothyroxine  75 mcg Oral Early Morning Gregory Spearing, CRNP      nicotine  21 mg Transdermal Daily Gregory Spearing, CRNP      pantoprazole  40 mg Oral Early Morning Kamille Berry MD      polyethylene glycol  17 g Oral Daily Gregory Spearing, CRNP      senna  1 tablet Oral HS Gregory Spearing, CRNP      sodium chloride  10 mL/hr Intravenous Continuous Gregory Magalieing, CRNP 10 mL/hr (09/08/22 1961)        Today, Patient Was Seen By: Avni Gomez MD    ** Please Note: Dictation voice to text software may have been used in the creation of this document   **

## 2022-09-10 NOTE — CONSULTS
Consultation - [address] [facility]     Ariane Patel 36 y o  male MRN: [id number]  Unit/Bed#: -01 SDU Encounter: [id number]    Consults    ASSESSMENT and PLAN    1  Dysphagia and globus sensation  2  Jaw pain    44-year-old male with 2 weeks of dysphagia and globus sensation with associated jaw pains for the 1st few bites of eating  Currently having no trouble eating and finished a complete lunch - house diet  Unclear if there is and a luminal issues like a stricture, question esophagitis given nausea vomiting and DKA at presentation, or even any kind of motility issue  However, given the jaw pains associated with it, also concerning for musculoskeletal/deeper dental issues  Chest x-ray negative  - From a GI perspective, will obtain a barium swallow, and pending this consider an EGD  - will also empirically start some PPI daily  - consider dental evaluation given his pain with chewing  3  Polysubstance abuse    4  DKA     Resolved  Appreciate endocrinology follow-up  5  Chronic constipation    Continue current bowel regimen    Chief Complaint   Patient presents with    Hyperglycemia - Symptomatic     Type 2 diabteic Pt c/o of nausea vomiting x 3 days  Pt sugars greater than 500  Pt not taking his insulin   Weakness - Generalized       Physician Requesting Consult: Jose Chao MD    HPI  Ariane Patel is a 36y o  year old male past medical history significant for diabetes and polysubstance abuse who presents with weakness, nausea, vomiting for 3 days  Found to be in DKA and was admitted to the ICU and now has improved  GI is consulted today for dysphagia  Patient states that he has been having intermittent episodes dysphagia for the past 2 weeks  This is preceding his current nausea vomiting episode although he does have recurrent issues with his poor sugar control due to compliance of his insulin  Patient describes his dysphagia as follows    It only happens the 1st few bites of his meals  When he starts to chew he gets sharp pains in his jaw line with a sensation of something like a lump in the middle of his neck  After a few bites, the sensation usually goes away  Denies significant nausea or vomiting except for when he is in DKA  He does state that at 1 point the pain was so severe he thinks he vomited the food prior to admission  Never any bleeding  No abdominal pains  No diarrhea or GI bleeding from below but has chronic constipation  Today at time of evaluation, patient is eating a grilled cheese sandwich and a regular diet without any issues  Historical Information   Past Medical History:   Diagnosis Date    Diabetes mellitus ([facility] [address] )     Disease of thyroid gland      History reviewed  No pertinent surgical history  Social History   Social History     Substance and Sexual Activity   Alcohol Use Not Currently    Comment: ocassionally     Social History     Substance and Sexual Activity   Drug Use Yes    Types: Methamphetamines, Heroin    Comment: daily, meth last used 1 week ago, heroin last use 5/14     Social History     Tobacco Use   Smoking Status Current Every Day Smoker    Packs/day: 1 00    Years: 20 00    Pack years: 20 00    Types: Cigarettes   Smokeless Tobacco Never Used     History reviewed  No pertinent family history      Meds/Allergies     Current Facility-Administered Medications   Medication Dose Route Frequency    acetaminophen (TYLENOL) tablet 650 mg  650 mg Oral Q6H PRN    bisacodyl (DULCOLAX) rectal suppository 10 mg  10 mg Rectal Daily PRN    heparin (porcine) subcutaneous injection 5,000 Units  5,000 Units Subcutaneous Q8H [facility]    insulin glargine (LANTUS) subcutaneous injection 25 Units 0 25 mL  25 Units Subcutaneous HS    insulin lispro (HumaLOG) 100 units/mL subcutaneous injection 1-6 Units  1-6 Units Subcutaneous TID AC    insulin lispro (HumaLOG) 100 units/mL subcutaneous injection 1-6 Units  1-6 Units Subcutaneous HS    insulin lispro (HumaLOG) 100 units/mL subcutaneous injection 5 Units  5 Units Subcutaneous Daily With Breakfast    insulin lispro (HumaLOG) 100 units/mL subcutaneous injection 5 Units  5 Units Subcutaneous Daily With Dinner    insulin lispro (HumaLOG) 100 units/mL subcutaneous injection 5 Units  5 Units Subcutaneous Daily With Lunch    levothyroxine tablet 75 mcg  75 mcg Oral Early Morning    nicotine (NICODERM CQ) 21 mg/24 hr TD 24 hr patch 21 mg  21 mg Transdermal Daily    polyethylene glycol (MIRALAX) packet 17 g  17 g Oral Daily    senna (SENOKOT) tablet 8 6 mg  1 tablet Oral HS    sodium chloride 0 9 % infusion  10 mL/hr Intravenous Continuous     Medications Prior to Admission   Medication    levothyroxine 75 mcg tablet    insulin NPH (HumuLIN N,NovoLIN N) 100 Units/mL subcutaneous injection       No Known Allergies    PHYSICAL EXAM    Blood pressure 119/82, pulse 63, temperature 98 1 °F (36 7 °C), temperature source Oral, resp  rate 20, height 5' 11" (1 803 m), weight 74 7 kg (164 lb 10 9 oz), SpO2 93 %  Body mass index is 22 97 kg/m²  General Appearance: NAD, cooperative, alert  Eyes: Anicteric, PERRLA, EOMI  ENT:  Normocephalic, atraumatic, normal mucosa  Neck:  Supple, symmetrical, trachea midline  Resp:  Clear to auscultation bilaterally; no rales, rhonchi or wheezing; respirations unlabored   CV:  S1 S2, Regular rate and rhythm; no murmur, rub, or gallop  GI:  Soft, non-tender, non-distended; normal bowel sounds; no masses, no organomegaly   Rectal: Deferred  Musculoskeletal: No cyanosis, clubbing or edema  Normal ROM    Skin:  No jaundice, rashes, or lesions   Heme/Lymph: No palpable cervical lymphadenopathy  Psych: Normal affect, good eye contact  Neuro: No gross deficits, AAOx3    Lab Results   Component Value Date    GLUCOSE >600 (HH) 09/07/2022    CALCIUM 8 4 09/10/2022    K 3 6 09/10/2022    CO2 28 09/10/2022    CL 99 09/10/2022    BUN 5 09/10/2022    CREATININE 0 70 09/10/2022     Lab Results   Component Value Date    WBC 7 88 09/10/2022    HGB 13 0 09/10/2022    HCT 37 2 09/10/2022    MCV 87 09/10/2022     09/10/2022     Lab Results   Component Value Date    ALT 29 09/07/2022    AST 15 09/07/2022    ALKPHOS 216 (H) 09/07/2022     No results found for: AMYLASE  Lab Results   Component Value Date    LIPASE 116 09/07/2022     Lab Results   Component Value Date    IRON 93 04/19/2020    TIBC 235 (L) 04/19/2020    FERRITIN 666 (H) 04/19/2020     No results found for: INR    Imaging Studies: I have personally reviewed pertinent reports  and I have personally reviewed pertinent films in PACS    EKG, Pathology, and Other Studies: I have personally reviewed pertinent reports  and I have personally reviewed pertinent films in PACS    REVIEW OF SYSTEMS:    CONSTITUTIONAL: Denies any fever, chills, rigors, and weight loss  HEENT: No earache or tinnitus  Denies hearing loss or visual disturbances  CARDIOVASCULAR: No chest pain or palpitations  RESPIRATORY: Denies any cough, hemoptysis, shortness of breath or dyspnea on exertion  GASTROINTESTINAL: As noted in the History of Present Illness  GENITOURINARY: No problems with urination  Denies any hematuria or dysuria  NEUROLOGIC: No dizziness or vertigo, denies headaches  MUSCULOSKELETAL: Denies any muscle or joint pain  SKIN: Denies skin rashes or itching  ENDOCRINE: Denies excessive thirst  Denies intolerance to heat or cold  PSYCHOSOCIAL: Denies depression or anxiety  Denies any recent memory loss

## 2022-09-10 NOTE — PLAN OF CARE
Problem: Potential for Falls  Goal: Patient will remain free of falls  Description: INTERVENTIONS:  - Educate patient/family on patient safety including physical limitations  - Instruct patient to call for assistance with activity   - Consult OT/PT to assist with strengthening/mobility   - Keep Call bell within reach  - Keep bed low and locked with side rails adjusted as appropriate  - Keep care items and personal belongings within reach  - Initiate and maintain comfort rounds  - Make Fall Risk Sign visible to staff  - Offer Toileting every 2 Hours, in advance of need  - Initiate/Maintain bed alarm  -- Apply yellow socks and bracelet for high fall risk patients  - Consider moving patient to room near nurses station  Outcome: Progressing     Problem: Nutrition/Hydration-ADULT  Goal: Nutrient/Hydration intake appropriate for improving, restoring or maintaining nutritional needs  Description: Monitor and assess patient's nutrition/hydration status for malnutrition  Collaborate with interdisciplinary team and initiate plan and interventions as ordered  Monitor patient's weight and dietary intake as ordered or per policy  Utilize nutrition screening tool and intervene as necessary  Determine patient's food preferences and provide high-protein, high-caloric foods as appropriate       INTERVENTIONS:  - Monitor oral intake, urinary output, labs, and treatment plans  - Assess nutrition and hydration status and recommend course of action  - Evaluate amount of meals eaten  - Assist patient with eating if necessary   - Allow adequate time for meals  - Recommend/ encourage appropriate diets, oral nutritional supplements, and vitamin/mineral supplements  - Order, calculate, and assess calorie counts as needed  - Recommend, monitor, and adjust tube feedings and TPN/PPN based on assessed needs  - Assess need for intravenous fluids  - Provide specific nutrition/hydration education as appropriate  - Include patient/family/caregiver in decisions related to nutrition  Outcome: Progressing     Problem: MOBILITY - ADULT  Goal: Maintain or return to baseline ADL function  Description: INTERVENTIONS:  -  Assess patient's ability to carry out ADLs; assess patient's baseline for ADL function and identify physical deficits which impact ability to perform ADLs (bathing, care of mouth/teeth, toileting, grooming, dressing, etc )  - Assess/evaluate cause of self-care deficits   - Assess range of motion  - Assess patient's mobility; develop plan if impaired  - Assess patient's need for assistive devices and provide as appropriate  - Encourage maximum independence but intervene and supervise when necessary  - Involve family in performance of ADLs  - Assess for home care needs following discharge   - Consider OT consult to assist with ADL evaluation and planning for discharge  - Provide patient education as appropriate  Outcome: Progressing  Goal: Maintains/Returns to pre admission functional level  Description: INTERVENTIONS:  - Perform BMAT or MOVE assessment daily    - Set and communicate daily mobility goal to care team and patient/family/caregiver  - Collaborate with rehabilitation services on mobility goals if consulted  - Reposition patient every 2 hours    - Dangle patient 2 times a day  - Stand patient 2 times a day  - Ambulate patient 2 times a day  - Out of bed to chair 2 times a day   - Out of bed for meals 2 times a day  - Out of bed for toileting  - Record patient progress and toleration of activity level   Outcome: Progressing     Problem: Prexisting or High Potential for Compromised Skin Integrity  Goal: Skin integrity is maintained or improved  Description: INTERVENTIONS:  - Identify patients at risk for skin breakdown  - Assess and monitor skin integrity  - Assess and monitor nutrition and hydration status  - Monitor labs   - Assess for incontinence   - Turn and reposition patient  - Assist with mobility/ambulation  - Relieve pressure over bony prominences  - Avoid friction and shearing  - Provide appropriate hygiene as needed including keeping skin clean and dry  - Evaluate need for skin moisturizer/barrier cream  - Collaborate with interdisciplinary team   - Patient/family teaching  - Consider wound care consult   Outcome: Progressing     Problem: PAIN - ADULT  Goal: Verbalizes/displays adequate comfort level or baseline comfort level  Description: Interventions:  - Encourage patient to monitor pain and request assistance  - Assess pain using appropriate pain scale  - Administer analgesics based on type and severity of pain and evaluate response  - Implement non-pharmacological measures as appropriate and evaluate response  - Consider cultural and social influences on pain and pain management  - Notify physician/advanced practitioner if interventions unsuccessful or patient reports new pain  Outcome: Progressing     Problem: INFECTION - ADULT  Goal: Absence or prevention of progression during hospitalization  Description: INTERVENTIONS:  - Assess and monitor for signs and symptoms of infection  - Monitor lab/diagnostic results  - Monitor all insertion sites, i e  indwelling lines, tubes, and drains  - Monitor endotracheal if appropriate and nasal secretions for changes in amount and color  - [place] appropriate cooling/warming therapies per order  - Administer medications as ordered  - Instruct and encourage patient and family to use good hand hygiene technique  - Identify and instruct in appropriate isolation precautions for identified infection/condition  Outcome: Progressing     Problem: SAFETY ADULT  Goal: Patient will remain free of falls  Description: INTERVENTIONS:  - Educate patient/family on patient safety including physical limitations  - Instruct patient to call for assistance with activity   - Consult OT/PT to assist with strengthening/mobility   - Keep Call bell within reach  - Keep bed low and locked with side rails adjusted as appropriate  - Keep care items and personal belongings within reach  - Initiate and maintain comfort rounds  - Make Fall Risk Sign visible to staff  - Offer Toileting every 2 Hours, in advance of need  - Initiate/Maintain bed alarm  -- Apply yellow socks and bracelet for high fall risk patients  - Consider moving patient to room near nurses station  Outcome: Progressing  Goal: Maintain or return to baseline ADL function  Description: INTERVENTIONS:  -  Assess patient's ability to carry out ADLs; assess patient's baseline for ADL function and identify physical deficits which impact ability to perform ADLs (bathing, care of mouth/teeth, toileting, grooming, dressing, etc )  - Assess/evaluate cause of self-care deficits   - Assess range of motion  - Assess patient's mobility; develop plan if impaired  - Assess patient's need for assistive devices and provide as appropriate  - Encourage maximum independence but intervene and supervise when necessary  - Involve family in performance of ADLs  - Assess for home care needs following discharge   - Consider OT consult to assist with ADL evaluation and planning for discharge  - Provide patient education as appropriate  Outcome: Progressing  Goal: Maintains/Returns to pre admission functional level  Description: INTERVENTIONS:  - Perform BMAT or MOVE assessment daily    - Set and communicate daily mobility goal to care team and patient/family/caregiver  - Collaborate with rehabilitation services on mobility goals if consulted  -- Reposition patient every 2 hours    - Dangle patient 2 times a day  - Stand patient 2 times a day  - Ambulate patient 2 times a day  - Out of bed to chair 2 times a day   - Out of bed for meals 2 times a day  - Out of bed for toileting  - Record patient progress and toleration of activity level   Outcome: Progressing     Problem: DISCHARGE PLANNING  Goal: Discharge to home or other facility with appropriate resources  Description: INTERVENTIONS:  - Identify barriers to discharge w/patient and caregiver  - Arrange for needed discharge resources and transportation as appropriate  - Identify discharge learning needs (meds, wound care, etc )  - Arrange for interpretive services to assist at discharge as needed  - Refer to Case Management Department for coordinating discharge planning if the patient needs post-hospital services based on physician/advanced practitioner order or complex needs related to functional status, cognitive ability, or social support system  Outcome: Progressing     Problem: Knowledge Deficit  Goal: Patient/family/caregiver demonstrates understanding of disease process, treatment plan, medications, and discharge instructions  Description: Complete learning assessment and assess knowledge base    Interventions:  - Provide teaching at level of understanding  - Provide teaching via preferred learning methods  Outcome: Progressing

## 2022-09-10 NOTE — ASSESSMENT & PLAN NOTE
Lab Results   Component Value Date    HGBA1C 12 6 (H) 09/07/2022       Recent Labs     09/09/22  2019 09/10/22  0257 09/10/22  0712 09/10/22  1110   POCGLU 221* 168* 198* 192*       Blood Sugar Average: Last 72 hrs:  (P) 321 [id number]

## 2022-09-11 LAB
ALBUMIN SERPL BCP-MCNC: 2.3 G/DL (ref 3.5–5)
ALP SERPL-CCNC: 142 U/L (ref 46–116)
ALT SERPL W P-5'-P-CCNC: 32 U/L (ref 12–78)
ANION GAP SERPL CALCULATED.3IONS-SCNC: 6 MMOL/L (ref 4–13)
AST SERPL W P-5'-P-CCNC: 40 U/L (ref 5–45)
BASOPHILS # BLD AUTO: 0.07 THOUSANDS/ΜL (ref 0–0.1)
BASOPHILS NFR BLD AUTO: 1 % (ref 0–1)
BILIRUB SERPL-MCNC: 0.4 MG/DL (ref 0.2–1)
BUN SERPL-MCNC: 9 MG/DL (ref 5–25)
CALCIUM ALBUM COR SERPL-MCNC: 10.2 MG/DL (ref 8.3–10.1)
CALCIUM SERPL-MCNC: 8.8 MG/DL (ref 8.3–10.1)
CHLORIDE SERPL-SCNC: 102 MMOL/L (ref 96–108)
CO2 SERPL-SCNC: 30 MMOL/L (ref 21–32)
CREAT SERPL-MCNC: 0.84 MG/DL (ref 0.6–1.3)
EOSINOPHIL # BLD AUTO: 0.26 THOUSAND/ΜL (ref 0–0.61)
EOSINOPHIL NFR BLD AUTO: 3 % (ref 0–6)
ERYTHROCYTE [DISTWIDTH] IN BLOOD BY AUTOMATED COUNT: 14.7 % (ref 11.6–15.1)
GFR SERPL CREATININE-BSD FRML MDRD: 107 ML/MIN/1.73SQ M
GLUCOSE SERPL-MCNC: 107 MG/DL (ref 65–140)
GLUCOSE SERPL-MCNC: 120 MG/DL (ref 65–140)
GLUCOSE SERPL-MCNC: 136 MG/DL (ref 65–140)
GLUCOSE SERPL-MCNC: 178 MG/DL (ref 65–140)
GLUCOSE SERPL-MCNC: 255 MG/DL (ref 65–140)
HCT VFR BLD AUTO: 39.8 % (ref 36.5–49.3)
HGB BLD-MCNC: 13.5 G/DL (ref 12–17)
IMM GRANULOCYTES # BLD AUTO: 0.05 THOUSAND/UL (ref 0–0.2)
IMM GRANULOCYTES NFR BLD AUTO: 1 % (ref 0–2)
LYMPHOCYTES # BLD AUTO: 4.78 THOUSANDS/ΜL (ref 0.6–4.47)
LYMPHOCYTES NFR BLD AUTO: 51 % (ref 14–44)
MCH RBC QN AUTO: 29.5 PG (ref 26.8–34.3)
MCHC RBC AUTO-ENTMCNC: 33.9 G/DL (ref 31.4–37.4)
MCV RBC AUTO: 87 FL (ref 82–98)
MONOCYTES # BLD AUTO: 0.77 THOUSAND/ΜL (ref 0.17–1.22)
MONOCYTES NFR BLD AUTO: 8 % (ref 4–12)
NEUTROPHILS # BLD AUTO: 3.35 THOUSANDS/ΜL (ref 1.85–7.62)
NEUTS SEG NFR BLD AUTO: 36 % (ref 43–75)
NRBC BLD AUTO-RTO: 0 /100 WBCS
PLATELET # BLD AUTO: 300 THOUSANDS/UL (ref 149–390)
PMV BLD AUTO: 10.8 FL (ref 8.9–12.7)
POTASSIUM SERPL-SCNC: 3.6 MMOL/L (ref 3.5–5.3)
PROT SERPL-MCNC: 6.4 G/DL (ref 6.4–8.4)
RBC # BLD AUTO: 4.58 MILLION/UL (ref 3.88–5.62)
SODIUM SERPL-SCNC: 138 MMOL/L (ref 135–147)
WBC # BLD AUTO: 9.28 THOUSAND/UL (ref 4.31–10.16)

## 2022-09-11 PROCEDURE — 99232 SBSQ HOSP IP/OBS MODERATE 35: CPT | Performed by: HOSPITALIST

## 2022-09-11 PROCEDURE — 85025 COMPLETE CBC W/AUTO DIFF WBC: CPT | Performed by: HOSPITALIST

## 2022-09-11 PROCEDURE — 80053 COMPREHEN METABOLIC PANEL: CPT | Performed by: HOSPITALIST

## 2022-09-11 PROCEDURE — 82948 REAGENT STRIP/BLOOD GLUCOSE: CPT

## 2022-09-11 PROCEDURE — 99232 SBSQ HOSP IP/OBS MODERATE 35: CPT | Performed by: INTERNAL MEDICINE

## 2022-09-11 RX ADMIN — INSULIN LISPRO 1 UNITS: 100 INJECTION, SOLUTION INTRAVENOUS; SUBCUTANEOUS at 12:21

## 2022-09-11 RX ADMIN — HEPARIN SODIUM 5000 UNITS: 5000 INJECTION INTRAVENOUS; SUBCUTANEOUS at 14:59

## 2022-09-11 RX ADMIN — INSULIN LISPRO 5 UNITS: 100 INJECTION, SOLUTION INTRAVENOUS; SUBCUTANEOUS at 17:02

## 2022-09-11 RX ADMIN — INSULIN LISPRO 5 UNITS: 100 INJECTION, SOLUTION INTRAVENOUS; SUBCUTANEOUS at 08:24

## 2022-09-11 RX ADMIN — LEVOTHYROXINE SODIUM 75 MCG: 75 TABLET ORAL at 04:08

## 2022-09-11 RX ADMIN — INSULIN LISPRO 3 UNITS: 100 INJECTION, SOLUTION INTRAVENOUS; SUBCUTANEOUS at 21:43

## 2022-09-11 RX ADMIN — PANTOPRAZOLE SODIUM 40 MG: 40 TABLET, DELAYED RELEASE ORAL at 06:07

## 2022-09-11 RX ADMIN — HEPARIN SODIUM 5000 UNITS: 5000 INJECTION INTRAVENOUS; SUBCUTANEOUS at 21:43

## 2022-09-11 RX ADMIN — INSULIN GLARGINE 25 UNITS: 100 INJECTION, SOLUTION SUBCUTANEOUS at 21:43

## 2022-09-11 RX ADMIN — NICOTINE 21 MG: 21 PATCH, EXTENDED RELEASE TRANSDERMAL at 08:25

## 2022-09-11 RX ADMIN — INSULIN LISPRO 5 UNITS: 100 INJECTION, SOLUTION INTRAVENOUS; SUBCUTANEOUS at 12:22

## 2022-09-11 RX ADMIN — ACETAMINOPHEN 325MG 650 MG: 325 TABLET ORAL at 04:08

## 2022-09-11 RX ADMIN — HEPARIN SODIUM 5000 UNITS: 5000 INJECTION INTRAVENOUS; SUBCUTANEOUS at 06:07

## 2022-09-11 NOTE — PROGRESS NOTES
[place]  Progress Note - Edy Sapna 1980, 36 y o  male MRN: [id number]  Unit/Bed#: -01 Encounter: [id number]  Primary Care Provider: No primary care provider on file  Date and time admitted to hospital: 9/7/2022  7:46 PM       * Diabetic ketoacidosis without coma [facility])  Assessment & Plan            Lab Results   Component Value Date     HGBA1C 12 6 (H) 09/07/2022               Blood Sugar Average: Last 72 hrs:  ·  Pt with Hx of IDDM, reports he stopped taking his insulin 5 days PTA because he didn't want to take it  · Reports for for 3 days PTA, he had N/V, abd pain  Found to be in DKA  · Pertinent labs: Ph 6 86, serum bicarb <5, , AG unable to be calculated  · A1C: 12 6  · Received DKA fluid resuscitation in the ED, Also given 1 amp bicarb, 7 units regular insulin bolus and started on DKA insulin gtt  ? Gap closed x2 and transitioned to non-CC insulin gtt  · Transitioned to basal bolus regimen     Metabolic acidosis  Assessment & Plan  · Severe metabolic acidosis secondary to DKA for medication noncompliance  · Ph on admission 6 86, serum bicarb < 5  · Fluid resuscitated per DKA protocol started and will continue with aggressive fluid resuscitation  · Given 1 amp bicarb in the ED  · resolved     Medically noncompliant  Assessment & Plan  · Reports not taking his home synthroid for over a month and hasn't had his insulin in 5 days  He reports he has these medications at home, he just wasn't taking them  · Will need further education when appropriate on the importance of medication compliance   · Pt also requesting a glucometer for home since he doesn't have one   Care management consulted       Hypothyroidism  Assessment & Plan  · Pt has not taken his home med for over a month  · TSH on admission 24 5  · Resume home levothyroxine     Type 1 diabetes mellitus with hyperglycemia [facility])  Assessment & Plan            Lab Results   Component Value Date     HGBA1C 12 6 (H) 2022            Blood Sugar Average: Last 72 hrs:  ·  Medication noncompliance at home, hasn't taken his insulin for 5 days PTA  · Admitted with DKA  · See plan above under DKA     BENJI (acute kidney injury) ([facility] [address] )  Assessment & Plan  · Baseline creat appears to be 0 6-0 8  · Admission creat 2 05  · Likely 2/2 intravascular dehydration in the setting of DKA   · Resolvedwith IVF administration   · UA neg for infection  · Strict I&O   · Avoid hypotension and nephrotoxic agents      Nicotine dependence  Assessment & Plan  · PAD smoker  · Encourage cessation   · Start NRT with patch     Hyponatremia  Assessment & Plan  · Pseudo-hyponatremia  · Na 124 on admission, Corrected for glucose it was 137     Dysphagia  apprec GI   Started on PPI  For barium swallow  Need for EGD appears less given patient able to eat more  VTE  Prophylaxis:   Pharmacologic: in place    Patient Centered Rounds: I have performed bedside rounds with nursing staff today  Discussions with Specialists or Other Care Team Provider: case management    Education and Discussions with Family / Patient: pt      Current Length of Stay: 4 day(s)    Current Patient Status: Inpatient        Code Status: Level 1 - Full Code      Subjective:     Pt seen  States able to eat more, swallowing improved  No other complaints    Patient is seen and examined at bedside  All other ROS are negative  Objective:     Vitals:   Temp (24hrs), Av 3 °F (36 8 °C), Min:98 °F (36 7 °C), Max:98 9 °F (37 2 °C)    Temp:  [98 °F (36 7 °C)-98 9 °F (37 2 °C)] 98 1 °F (36 7 °C)  HR:  [78] 78  Resp:  [15-18] 15  BP: (130-141)/(85-90) 141/90  Body mass index is 22 57 kg/m²  Input and Output Summary (last 24 hours):        Intake/Output Summary (Last 24 hours) at 2022 1202  Last data filed at 2022 0531  Gross per 24 hour   Intake 300 ml   Output --   Net 300 ml       Physical Exam:       GEN: No acute distress, comfortable  HEEENT: No JVD, PERRLA, no scleral icterus  RESP: Lungs clear to auscultation bilaterally  CV: RRR, +s1/s2   ABD: SOFT NON TENDER, POSITIVE BOWEL SOUNDS, NO DISTENTION  PSYCH: CALM  NEURO: A X O X 3, NO FOCAL DEFICITS  SKIN: NO RASH  EXTREM: NO EDEMA    Additional Data:     Labs:    Results from last 7 days   Lab Units 09/11/22  0417   WBC Thousand/uL 9 28   HEMOGLOBIN g/dL 13 5   HEMATOCRIT % 39 8   PLATELETS Thousands/uL 300   NEUTROS PCT % 36*   LYMPHS PCT % 51*   MONOS PCT % 8   EOS PCT % 3     Results from last 7 days   Lab Units 09/11/22  0417   SODIUM mmol/L 138   POTASSIUM mmol/L 3 6   CHLORIDE mmol/L 102   CO2 mmol/L 30   BUN mg/dL 9   CREATININE mg/dL 0 84   ANION GAP mmol/L 6   CALCIUM mg/dL 8 8   ALBUMIN g/dL 2 3*   TOTAL BILIRUBIN mg/dL 0 40   ALK PHOS U/L 142*   ALT U/L 32   AST U/L 40   GLUCOSE RANDOM mg/dL 107         Results from last 7 days   Lab Units 09/11/22  1110 09/11/22  0718 09/10/22  2107 09/10/22  1559 09/10/22  1110 09/10/22  0712 09/10/22  0257 09/09/22  2019 09/09/22  1801 09/09/22  1554 09/09/22  1405 09/09/22  1205   POC GLUCOSE mg/dl 178* 120 193* 253* 192* 198* 168* 221* 219* 144* 214* 129     Results from last 7 days   Lab Units 09/07/22  2004   HEMOGLOBIN A1C % 12 6*               * I Have Reviewed All Lab Data Listed Above  Imaging:     Results for orders placed during the hospital encounter of 09/07/22    XR chest 1 view portable    Narrative  CHEST    INDICATION:   shortness of breath  COMPARISON:  5/14/2020    EXAM PERFORMED/VIEWS:  XR CHEST PORTABLE      FINDINGS:    Cardiomediastinal silhouette appears unremarkable  The lungs are clear  No pneumothorax or pleural effusion  Osseous structures appear within normal limits for patient age  Right cervical rib again noted  Impression  No acute cardiopulmonary disease              Workstation performed: [id number]    Results for orders placed during the hospital encounter of 05/14/20    XR chest 2 views    Narrative  CHEST    INDICATION: chest pain  COMPARISON:  Chest radiograph from 5/6/2020  EXAM PERFORMED/VIEWS:  XR CHEST PA & LATERAL  DUAL ENERGY SUBTRACTION TECHNIQUE      FINDINGS:    Cardiomediastinal silhouette appears unremarkable  The lungs are clear  No pneumothorax or pleural effusion  Osseous structures appear within normal limits for patient age  Right cervical rib, a normal variant  Impression  No acute cardiopulmonary disease  Workstation performed: [id number]      *I have reviewed all imaging reports listed above      Recent Cultures (last 7 days):           Last 24 Hours Medication List:   Current Facility-Administered Medications   Medication Dose Route Frequency Provider Last Rate    acetaminophen  650 mg Oral Q6H PRN Zhane Mann PA-C      bisacodyl  10 mg Rectal Daily PRN Zhane Mann PA-C      heparin (porcine)  5,000 Units Subcutaneous Q8H [address] Zhane Mann, PA-CASH      insulin glargine  25 Units Subcutaneous HS Zhane Mann, PA-CASH      insulin lispro  1-6 Units Subcutaneous TID AC Zhane Mann, PA-CASH      insulin lispro  1-6 Units Subcutaneous HS Zhane Mann, PA-CASH      insulin lispro  5 Units Subcutaneous Daily With Breakfast Zhane Mann, PA-C      insulin lispro  5 Units Subcutaneous Daily With Dinner Zhane Mann, PA-C      insulin lispro  5 Units Subcutaneous Daily With Lunch Zhane Mann, PA-C      levothyroxine  75 mcg Oral Early Morning Zhane Mann, PA-C      nicotine  21 mg Transdermal Daily Zhane Mann, PA-C      pantoprazole  40 mg Oral Early Morning Zhane Mackenzie, PA-C      polyethylene glycol  17 g Oral Daily Zhane Mann, PA-CASH      senna  1 tablet Oral HS Zhane Mann PA-C          Today, Patient Was Seen By: Jose Sandy MD    ** Please Note: Dictation voice to text software may have been used in the creation of this document   **

## 2022-09-11 NOTE — PLAN OF CARE
Problem: Potential for Falls  Goal: Patient will remain free of falls  Description: INTERVENTIONS:  - Educate patient/family on patient safety including physical limitations  - Instruct patient to call for assistance with activity   - Consult OT/PT to assist with strengthening/mobility   - Keep Call bell within reach  - Keep bed low and locked with side rails adjusted as appropriate  - Keep care items and personal belongings within reach  - Initiate and maintain comfort rounds  - Make Fall Risk Sign visible to staff  - Offer Toileting every 2 Hours, in advance of need  - Initiate/Maintain alarm  - Obtain necessary fall risk management equipment  - Apply yellow socks and bracelet for high fall risk patients  - Consider moving patient to room near nurses station  Outcome: Progressing     Problem: Nutrition/Hydration-ADULT  Goal: Nutrient/Hydration intake appropriate for improving, restoring or maintaining nutritional needs  Description: Monitor and assess patient's nutrition/hydration status for malnutrition  Collaborate with interdisciplinary team and initiate plan and interventions as ordered  Monitor patient's weight and dietary intake as ordered or per policy  Utilize nutrition screening tool and intervene as necessary  Determine patient's food preferences and provide high-protein, high-caloric foods as appropriate       INTERVENTIONS:  - Monitor oral intake, urinary output, labs, and treatment plans  - Assess nutrition and hydration status and recommend course of action  - Evaluate amount of meals eaten  - Assist patient with eating if necessary   - Allow adequate time for meals  - Recommend/ encourage appropriate diets, oral nutritional supplements, and vitamin/mineral supplements  - Order, calculate, and assess calorie counts as needed  - Recommend, monitor, and adjust tube feedings and TPN/PPN based on assessed needs  - Assess need for intravenous fluids  - Provide specific nutrition/hydration education as appropriate  - Include patient/family/caregiver in decisions related to nutrition  Outcome: Progressing     Problem: MOBILITY - ADULT  Goal: Maintain or return to baseline ADL function  Description: INTERVENTIONS:  -  Assess patient's ability to carry out ADLs; assess patient's baseline for ADL function and identify physical deficits which impact ability to perform ADLs (bathing, care of mouth/teeth, toileting, grooming, dressing, etc )  - Assess/evaluate cause of self-care deficits   - Assess range of motion  - Assess patient's mobility; develop plan if impaired  - Assess patient's need for assistive devices and provide as appropriate  - Encourage maximum independence but intervene and supervise when necessary  - Involve family in performance of ADLs  - Assess for home care needs following discharge   - Consider OT consult to assist with ADL evaluation and planning for discharge  - Provide patient education as appropriate  Outcome: Progressing  Goal: Maintains/Returns to pre admission functional level  Description: INTERVENTIONS:  - Perform BMAT or MOVE assessment daily    - Set and communicate daily mobility goal to care team and patient/family/caregiver  - Collaborate with rehabilitation services on mobility goals if consulted  - Perform Range of Motion 5 times a day  - Reposition patient every 2 hours    - Dangle patient 3 times a day  - Stand patient 3 times a day  - Ambulate patient 3 times a day  - Out of bed to chair 3 times a day   - Out of bed for meals 3 times a day  - Out of bed for toileting  - Record patient progress and toleration of activity level   Outcome: Progressing     Problem: Prexisting or High Potential for Compromised Skin Integrity  Goal: Skin integrity is maintained or improved  Description: INTERVENTIONS:  - Identify patients at risk for skin breakdown  - Assess and monitor skin integrity  - Assess and monitor nutrition and hydration status  - Monitor labs   - Assess for incontinence - Turn and reposition patient  - Assist with mobility/ambulation  - Relieve pressure over bony prominences  - Avoid friction and shearing  - Provide appropriate hygiene as needed including keeping skin clean and dry  - Evaluate need for skin moisturizer/barrier cream  - Collaborate with interdisciplinary team   - Patient/family teaching  - Consider wound care consult   Outcome: Progressing     Problem: PAIN - ADULT  Goal: Verbalizes/displays adequate comfort level or baseline comfort level  Description: Interventions:  - Encourage patient to monitor pain and request assistance  - Assess pain using appropriate pain scale  - Administer analgesics based on type and severity of pain and evaluate response  - Implement non-pharmacological measures as appropriate and evaluate response  - Consider cultural and social influences on pain and pain management  - Notify physician/advanced practitioner if interventions unsuccessful or patient reports new pain  Outcome: Progressing     Problem: INFECTION - ADULT  Goal: Absence or prevention of progression during hospitalization  Description: INTERVENTIONS:  - Assess and monitor for signs and symptoms of infection  - Monitor lab/diagnostic results  - Monitor all insertion sites, i e  indwelling lines, tubes, and drains  - Monitor endotracheal if appropriate and nasal secretions for changes in amount and color  - [place] appropriate cooling/warming therapies per order  - Administer medications as ordered  - Instruct and encourage patient and family to use good hand hygiene technique  - Identify and instruct in appropriate isolation precautions for identified infection/condition  Outcome: Progressing     Problem: SAFETY ADULT  Goal: Patient will remain free of falls  Description: INTERVENTIONS:  - Educate patient/family on patient safety including physical limitations  - Instruct patient to call for assistance with activity   - Consult OT/PT to assist with strengthening/mobility - Keep Call bell within reach  - Keep bed low and locked with side rails adjusted as appropriate  - Keep care items and personal belongings within reach  - Initiate and maintain comfort rounds  - Make Fall Risk Sign visible to staff  - Offer Toileting every 2 Hours, in advance of need  - Initiate/Maintain alarm  - Obtain necessary fall risk management equipment  - Apply yellow socks and bracelet for high fall risk patients  - Consider moving patient to room near nurses station  Outcome: Progressing  Goal: Maintain or return to baseline ADL function  Description: INTERVENTIONS:  -  Assess patient's ability to carry out ADLs; assess patient's baseline for ADL function and identify physical deficits which impact ability to perform ADLs (bathing, care of mouth/teeth, toileting, grooming, dressing, etc )  - Assess/evaluate cause of self-care deficits   - Assess range of motion  - Assess patient's mobility; develop plan if impaired  - Assess patient's need for assistive devices and provide as appropriate  - Encourage maximum independence but intervene and supervise when necessary  - Involve family in performance of ADLs  - Assess for home care needs following discharge   - Consider OT consult to assist with ADL evaluation and planning for discharge  - Provide patient education as appropriate  Outcome: Progressing  Goal: Maintains/Returns to pre admission functional level  Description: INTERVENTIONS:  - Perform BMAT or MOVE assessment daily    - Set and communicate daily mobility goal to care team and patient/family/caregiver  - Collaborate with rehabilitation services on mobility goals if consulted  - Perform Range of Motion 5 times a day  - Reposition patient every 2 hours    - Dangle patient 3 times a day  - Stand patient 3 times a day  - Ambulate patient 3 times a day  - Out of bed to chair 3 times a day   - Out of bed for meals 3 times a day  - Out of bed for toileting  - Record patient progress and toleration of activity level   Outcome: Progressing     Problem: DISCHARGE PLANNING  Goal: Discharge to home or other facility with appropriate resources  Description: INTERVENTIONS:  - Identify barriers to discharge w/patient and caregiver  - Arrange for needed discharge resources and transportation as appropriate  - Identify discharge learning needs (meds, wound care, etc )  - Arrange for interpretive services to assist at discharge as needed  - Refer to Case Management Department for coordinating discharge planning if the patient needs post-hospital services based on physician/advanced practitioner order or complex needs related to functional status, cognitive ability, or social support system  Outcome: Progressing     Problem: Knowledge Deficit  Goal: Patient/family/caregiver demonstrates understanding of disease process, treatment plan, medications, and discharge instructions  Description: Complete learning assessment and assess knowledge base    Interventions:  - Provide teaching at level of understanding  - Provide teaching via preferred learning methods  Outcome: Progressing

## 2022-09-11 NOTE — PROGRESS NOTES
Progress note - Gastroenterology   Akila Gold 36 y o  male MRN: [id number]  Unit/Bed#: -01 Encounter: [id number]    ASSESSMENT and PLAN    1  Dysphagia and globus sensation  2  Jaw pain     79-year-old male with 2 weeks of intermittent dysphagia and globus sensation with associated jaw pains for the 1st few bites of eating  Currently having no trouble eating overall feeling better      Unclear if there is and a luminal issues like a stricture, question esophagitis given nausea vomiting and DKA at presentation, or even any kind of motility issue  However, given the jaw pains associated with it, also concerning for musculoskeletal/deeper dental issues  Chest x-ray negative      - From a GI perspective, will obtain a barium swallow given the globus sensation  If normal, recommend outpatient GI follow-up  - will also empirically start some PPI daily  - if patient is otherwise stable for discharge, okay to follow-up the barium swallow as outpatient as well  - consider dental evaluation given his pain with chewing and poor dentition as outpatient  - tolerating diet without issues  - discussed with slim     3  Polysubstance abuse     4  DKA      Resolved  Appreciate endocrinology follow-up       5  Chronic constipation     Continue current bowel regimen    Chief Complaint   Patient presents with    Hyperglycemia - Symptomatic     Type 2 diabteic Pt c/o of nausea vomiting x 3 days  Pt sugars greater than 500  Pt not taking his insulin      Weakness - Generalized       SUBJECTIVE/HPI   No issues tolerating diet    /90   Pulse 78   Temp 98 1 °F (36 7 °C)   Resp 15   Ht 5' 11" (1 803 m)   Wt 73 4 kg (161 lb 13 1 oz)   SpO2 93%   BMI 22 57 kg/m²     PHYSICALEXAM  General appearance: alert, appears stated age and cooperative  Eyes: CHRISTOPHER, EOMI, no scleral icterus   Head: Normocephalic, without obvious abnormality, atraumatic  Lungs: clear to auscultation bilaterally, no c/w/r  Heart: regular rate and rhythm, S1, S2 normal, no murmur, click, rub or gallop  Abdomen: soft, non-tender, non-distended; bowel sounds normal; no masses,  no organomegaly  Extremities: extremities normal, atraumatic, no clubbing or cyanosis   No LE edema  Neurologic: Grossly normal, AAOx3, no asterixis    Lab Results   Component Value Date    GLUCOSE >600 (HH) 09/07/2022    CALCIUM 8 8 09/11/2022    K 3 6 09/11/2022    CO2 30 09/11/2022     09/11/2022    BUN 9 09/11/2022    CREATININE 0 84 09/11/2022     Lab Results   Component Value Date    WBC 9 28 09/11/2022    HGB 13 5 09/11/2022    HCT 39 8 09/11/2022    MCV 87 09/11/2022     09/11/2022     Lab Results   Component Value Date    ALT 32 09/11/2022    AST 40 09/11/2022    ALKPHOS 142 (H) 09/11/2022     No results found for: AMYLASE  Lab Results   Component Value Date    LIPASE 116 09/07/2022     Lab Results   Component Value Date    IRON 93 04/19/2020    TIBC 235 (L) 04/19/2020    FERRITIN 666 (H) 04/19/2020     No results found for: INR

## 2022-09-11 NOTE — PLAN OF CARE
Problem: Nutrition/Hydration-ADULT  Goal: Nutrient/Hydration intake appropriate for improving, restoring or maintaining nutritional needs  Description: Monitor and assess patient's nutrition/hydration status for malnutrition  Collaborate with interdisciplinary team and initiate plan and interventions as ordered  Monitor patient's weight and dietary intake as ordered or per policy  Utilize nutrition screening tool and intervene as necessary  Determine patient's food preferences and provide high-protein, high-caloric foods as appropriate       INTERVENTIONS:  - Monitor oral intake, urinary output, labs, and treatment plans  - Assess nutrition and hydration status and recommend course of action  - Evaluate amount of meals eaten  - Assist patient with eating if necessary   - Allow adequate time for meals  - Recommend/ encourage appropriate diets, oral nutritional supplements, and vitamin/mineral supplements  - Order, calculate, and assess calorie counts as needed  - Recommend, monitor, and adjust tube feedings and TPN/PPN based on assessed needs  - Assess need for intravenous fluids  - Provide specific nutrition/hydration education as appropriate  - Include patient/family/caregiver in decisions related to nutrition  Outcome: Progressing     Problem: MOBILITY - ADULT  Goal: Maintain or return to baseline ADL function  Description: INTERVENTIONS:  -  Assess patient's ability to carry out ADLs; assess patient's baseline for ADL function and identify physical deficits which impact ability to perform ADLs (bathing, care of mouth/teeth, toileting, grooming, dressing, etc )  - Assess/evaluate cause of self-care deficits   - Assess range of motion  - Assess patient's mobility; develop plan if impaired  - Assess patient's need for assistive devices and provide as appropriate  - Encourage maximum independence but intervene and supervise when necessary  - Involve family in performance of ADLs  - Assess for home care needs following discharge   - Consider OT consult to assist with ADL evaluation and planning for discharge  - Provide patient education as appropriate  Outcome: Progressing    Problem: INFECTION - ADULT  Goal: Absence or prevention of progression during hospitalization  Description: INTERVENTIONS:  - Assess and monitor for signs and symptoms of infection  - Monitor lab/diagnostic results  - Monitor all insertion sites, i e  indwelling lines, tubes, and drains  - Monitor endotracheal if appropriate and nasal secretions for changes in amount and color  - [place] appropriate cooling/warming therapies per order  - Administer medications as ordered  - Instruct and encourage patient and family to use good hand hygiene technique  - Identify and instruct in appropriate isolation precautions for identified infection/condition  Outcome: Progressing      Problem: Prexisting or High Potential for Compromised Skin Integrity  Goal: Skin integrity is maintained or improved  Description: INTERVENTIONS:  - Identify patients at risk for skin breakdown  - Assess and monitor skin integrity  - Assess and monitor nutrition and hydration status  - Monitor labs   - Assess for incontinence   - Turn and reposition patient  - Assist with mobility/ambulation  - Relieve pressure over bony prominences  - Avoid friction and shearing  - Provide appropriate hygiene as needed including keeping skin clean and dry  - Evaluate need for skin moisturizer/barrier cream  - Collaborate with interdisciplinary team   - Patient/family teaching  - Consider wound care consult   Outcome: Progressing

## 2022-09-11 NOTE — ASSESSMENT & PLAN NOTE
Lab Results   Component Value Date    HGBA1C 12 6 (H) 09/07/2022       Recent Labs     09/10/22  1559 09/10/22  2107 09/11/22  0718 09/11/22  1110   POCGLU 253* 193* 120 178*       Blood Sugar Average: Last 72 hrs:  (P) 187 [id number]

## 2022-09-12 VITALS
BODY MASS INDEX: 22.72 KG/M2 | DIASTOLIC BLOOD PRESSURE: 64 MMHG | RESPIRATION RATE: 12 BRPM | TEMPERATURE: 98 F | SYSTOLIC BLOOD PRESSURE: 121 MMHG | HEART RATE: 83 BPM | WEIGHT: 162.26 LBS | HEIGHT: 71 IN | OXYGEN SATURATION: 95 %

## 2022-09-12 LAB
ALBUMIN SERPL BCP-MCNC: 2.1 G/DL (ref 3.5–5)
ALP SERPL-CCNC: 137 U/L (ref 46–116)
ALT SERPL W P-5'-P-CCNC: 46 U/L (ref 12–78)
ANION GAP SERPL CALCULATED.3IONS-SCNC: 8 MMOL/L (ref 4–13)
AST SERPL W P-5'-P-CCNC: 66 U/L (ref 5–45)
BASOPHILS # BLD MANUAL: 0.08 THOUSAND/UL (ref 0–0.1)
BASOPHILS NFR MAR MANUAL: 1 % (ref 0–1)
BILIRUB SERPL-MCNC: 0.2 MG/DL (ref 0.2–1)
BUN SERPL-MCNC: 9 MG/DL (ref 5–25)
CALCIUM ALBUM COR SERPL-MCNC: 10 MG/DL (ref 8.3–10.1)
CALCIUM SERPL-MCNC: 8.5 MG/DL (ref 8.3–10.1)
CHLORIDE SERPL-SCNC: 101 MMOL/L (ref 96–108)
CO2 SERPL-SCNC: 27 MMOL/L (ref 21–32)
CREAT SERPL-MCNC: 0.8 MG/DL (ref 0.6–1.3)
EOSINOPHIL # BLD MANUAL: 0.16 THOUSAND/UL (ref 0–0.4)
EOSINOPHIL NFR BLD MANUAL: 2 % (ref 0–6)
ERYTHROCYTE [DISTWIDTH] IN BLOOD BY AUTOMATED COUNT: 14.9 % (ref 11.6–15.1)
GFR SERPL CREATININE-BSD FRML MDRD: 109 ML/MIN/1.73SQ M
GLUCOSE SERPL-MCNC: 201 MG/DL (ref 65–140)
GLUCOSE SERPL-MCNC: 222 MG/DL (ref 65–140)
GLUCOSE SERPL-MCNC: 229 MG/DL (ref 65–140)
HCT VFR BLD AUTO: 39.8 % (ref 36.5–49.3)
HGB BLD-MCNC: 12.8 G/DL (ref 12–17)
LYMPHOCYTES # BLD AUTO: 4.13 THOUSAND/UL (ref 0.6–4.47)
LYMPHOCYTES # BLD AUTO: 52 % (ref 14–44)
MCH RBC QN AUTO: 29.2 PG (ref 26.8–34.3)
MCHC RBC AUTO-ENTMCNC: 32.2 G/DL (ref 31.4–37.4)
MCV RBC AUTO: 91 FL (ref 82–98)
MONOCYTES # BLD AUTO: 1.03 THOUSAND/UL (ref 0–1.22)
MONOCYTES NFR BLD: 13 % (ref 4–12)
NEUTROPHILS # BLD MANUAL: 2.54 THOUSAND/UL (ref 1.85–7.62)
NEUTS SEG NFR BLD AUTO: 32 % (ref 43–75)
PLATELET # BLD AUTO: 194 THOUSANDS/UL (ref 149–390)
PLATELET BLD QL SMEAR: ABNORMAL
PLATELET CLUMP BLD QL SMEAR: PRESENT
PMV BLD AUTO: 11.1 FL (ref 8.9–12.7)
POLYCHROMASIA BLD QL SMEAR: PRESENT
POTASSIUM SERPL-SCNC: 3.8 MMOL/L (ref 3.5–5.3)
PROT SERPL-MCNC: 6.1 G/DL (ref 6.4–8.4)
RBC # BLD AUTO: 4.39 MILLION/UL (ref 3.88–5.62)
RBC MORPH BLD: PRESENT
SODIUM SERPL-SCNC: 136 MMOL/L (ref 135–147)
WBC # BLD AUTO: 7.95 THOUSAND/UL (ref 4.31–10.16)

## 2022-09-12 PROCEDURE — 80053 COMPREHEN METABOLIC PANEL: CPT | Performed by: HOSPITALIST

## 2022-09-12 PROCEDURE — 99232 SBSQ HOSP IP/OBS MODERATE 35: CPT | Performed by: INTERNAL MEDICINE

## 2022-09-12 PROCEDURE — 99239 HOSP IP/OBS DSCHRG MGMT >30: CPT | Performed by: HOSPITALIST

## 2022-09-12 PROCEDURE — 85007 BL SMEAR W/DIFF WBC COUNT: CPT | Performed by: HOSPITALIST

## 2022-09-12 PROCEDURE — 82948 REAGENT STRIP/BLOOD GLUCOSE: CPT

## 2022-09-12 PROCEDURE — 85027 COMPLETE CBC AUTOMATED: CPT | Performed by: HOSPITALIST

## 2022-09-12 RX ORDER — LEVOTHYROXINE SODIUM 0.07 MG/1
75 TABLET ORAL DAILY
Qty: 30 TABLET | Refills: 0 | Status: SHIPPED | OUTPATIENT
Start: 2022-09-12 | End: 2022-10-10 | Stop reason: SDUPTHER

## 2022-09-12 RX ORDER — BLOOD SUGAR DIAGNOSTIC
STRIP MISCELLANEOUS
Qty: 200 EACH | Refills: 0 | Status: SHIPPED | OUTPATIENT
Start: 2022-09-12 | End: 2022-10-10 | Stop reason: SDUPTHER

## 2022-09-12 RX ORDER — BLOOD-GLUCOSE METER
KIT MISCELLANEOUS
Qty: 1 KIT | Refills: 0 | Status: SHIPPED | OUTPATIENT
Start: 2022-09-12

## 2022-09-12 RX ORDER — PEN NEEDLE, DIABETIC 32GX 5/32"
NEEDLE, DISPOSABLE MISCELLANEOUS
Qty: 100 EACH | Refills: 0 | Status: SHIPPED | OUTPATIENT
Start: 2022-09-12 | End: 2022-10-10 | Stop reason: SDUPTHER

## 2022-09-12 RX ORDER — LANCETS 33 GAUGE
EACH MISCELLANEOUS
Qty: 200 EACH | Refills: 0 | Status: SHIPPED | OUTPATIENT
Start: 2022-09-12 | End: 2022-10-10 | Stop reason: SDUPTHER

## 2022-09-12 RX ORDER — INSULIN GLARGINE 100 [IU]/ML
25 INJECTION, SOLUTION SUBCUTANEOUS
Qty: 9 ML | Refills: 0 | Status: SHIPPED | OUTPATIENT
Start: 2022-09-12 | End: 2022-10-17

## 2022-09-12 RX ORDER — PEN NEEDLE, DIABETIC 32GX 5/32"
NEEDLE, DISPOSABLE MISCELLANEOUS
Qty: 100 EACH | Refills: 0 | Status: SHIPPED | OUTPATIENT
Start: 2022-09-12

## 2022-09-12 RX ORDER — INSULIN ASPART 100 [IU]/ML
5 INJECTION, SOLUTION INTRAVENOUS; SUBCUTANEOUS
Qty: 6 ML | Refills: 0 | Status: SHIPPED | OUTPATIENT
Start: 2022-09-12 | End: 2022-10-10 | Stop reason: SDUPTHER

## 2022-09-12 RX ORDER — PANTOPRAZOLE SODIUM 40 MG/1
40 TABLET, DELAYED RELEASE ORAL
Qty: 30 TABLET | Refills: 0 | Status: SHIPPED | OUTPATIENT
Start: 2022-09-13 | End: 2022-10-10 | Stop reason: SDUPTHER

## 2022-09-12 RX ORDER — GLUCOSAMINE HCL/CHONDROITIN SU 500-400 MG
CAPSULE ORAL
Qty: 200 EACH | Refills: 0 | Status: SHIPPED | OUTPATIENT
Start: 2022-09-12

## 2022-09-12 RX ADMIN — INSULIN LISPRO 5 UNITS: 100 INJECTION, SOLUTION INTRAVENOUS; SUBCUTANEOUS at 12:35

## 2022-09-12 RX ADMIN — LEVOTHYROXINE SODIUM 75 MCG: 75 TABLET ORAL at 06:13

## 2022-09-12 RX ADMIN — INSULIN LISPRO 2 UNITS: 100 INJECTION, SOLUTION INTRAVENOUS; SUBCUTANEOUS at 09:04

## 2022-09-12 RX ADMIN — NICOTINE 21 MG: 21 PATCH, EXTENDED RELEASE TRANSDERMAL at 09:05

## 2022-09-12 RX ADMIN — INSULIN LISPRO 5 UNITS: 100 INJECTION, SOLUTION INTRAVENOUS; SUBCUTANEOUS at 09:05

## 2022-09-12 RX ADMIN — HEPARIN SODIUM 5000 UNITS: 5000 INJECTION INTRAVENOUS; SUBCUTANEOUS at 06:13

## 2022-09-12 RX ADMIN — INSULIN LISPRO 2 UNITS: 100 INJECTION, SOLUTION INTRAVENOUS; SUBCUTANEOUS at 12:35

## 2022-09-12 RX ADMIN — PANTOPRAZOLE SODIUM 40 MG: 40 TABLET, DELAYED RELEASE ORAL at 06:13

## 2022-09-12 NOTE — PROGRESS NOTES
Progress note - Gastroenterology   Jose R Search 36 y o  male MRN: [id number]  Unit/Bed#: -01 Encounter: [id number]    ASSESSMENT and PLAN    1  Dysphagia and globus sensation  2  Jaw pain     21-year-old male with 2 weeks of intermittent dysphagia and globus sensation with associated jaw pains for the 1st few bites of eating   Currently having no trouble eating overall feeling better      Unclear if there is and a luminal issues like a stricture, question esophagitis given nausea vomiting and DKA at presentation, or even any kind of motility issue   However, given the jaw pains associated with it, also concerning for musculoskeletal/deeper dental issues   Chest x-ray negative      - no GI barriers for discharge-  - if patient stable for discharge, follow-up barium swallow as outpatient  - discussed with patient outpatient GI follow-up  - continue PPI    - consider dental evaluation given his pain with chewing and poor dentition as outpatient  - tolerating diet without issues  - discussed with Hospitalist     3  Polysubstance abuse     4  DKA      Resolved   Appreciate endocrinology follow-up       5  Chronic constipation     Continue current bowel regimen      Chief Complaint   Patient presents with    Hyperglycemia - Symptomatic     Type 2 diabteic Pt c/o of nausea vomiting x 3 days  Pt sugars greater than 500  Pt not taking his insulin   Weakness - Generalized       SUBJECTIVE/HPI   States doing very well this morning  Denies any pain  States his swallowing is doing well  Eight is tired breakfast without difficulty  Discussed with patient follow-up barium swallow and with GI as an outpatient      /70 (BP Location: Left arm)   Pulse 70   Temp 98 °F (36 7 °C) (Oral)   Resp 12   Ht 5' 11" (1 803 m)   Wt 73 6 kg (162 lb 4 1 oz)   SpO2 95%   BMI 22 63 kg/m²     PHYSICALEXAM  General appearance: alert, appears stated age and cooperative  Eyes: PERLLA, EOMI, no icterus   Head: Normocephalic, without obvious abnormality, atraumatic  Lungs: clear to auscultation bilaterally  Heart: regular rate and rhythm, S1, S2 normal, no murmur, click, rub or gallop  Abdomen: soft, non-tender; bowel sounds normal; no masses,  no organomegaly  Extremities: extremities normal, atraumatic, no cyanosis or edema  Neurologic: Grossly normal    Lab Results   Component Value Date    GLUCOSE >600 (HH) 09/07/2022    CALCIUM 8 5 09/12/2022    K 3 8 09/12/2022    CO2 27 09/12/2022     09/12/2022    BUN 9 09/12/2022    CREATININE 0 80 09/12/2022     Lab Results   Component Value Date    WBC 7 95 09/12/2022    HGB 12 8 09/12/2022    HCT 39 8 09/12/2022    MCV 91 09/12/2022     09/12/2022     Lab Results   Component Value Date    ALT 46 09/12/2022    AST 66 (H) 09/12/2022    ALKPHOS 137 (H) 09/12/2022     No results found for: AMYLASE  Lab Results   Component Value Date    LIPASE 116 09/07/2022     Lab Results   Component Value Date    IRON 93 04/19/2020    TIBC 235 (L) 04/19/2020    FERRITIN 666 (H) 04/19/2020     No results found for: INR

## 2022-09-12 NOTE — DISCHARGE SUMMARY
[facility]  Discharge- Loretta Monterroso 1980, 36 y o  male MRN: [id number]  Unit/Bed#: -01 Encounter: [id number]  Primary Care Provider: No primary care provider on file  Date and time admitted to hospital: 9/7/2022  7:46 PM   * Diabetic ketoacidosis without coma [facility])  Assessment & Plan            Lab Results   Component Value Date     HGBA1C 12 6 (H) 09/07/2022               Blood Sugar Average: Last 72 hrs:  ·  Pt with Hx of IDDM, reports he stopped taking his insulin 5 days PTA because he didn't want to take it  · Reports for for 3 days PTA, he had N/V, abd pain  Found to be in DKA  · Pertinent labs: Ph 6 86, serum bicarb <5, , AG unable to be calculated  · A1C: 12 6  · Received DKA fluid resuscitation in the ED, Also given 1 amp bicarb, 7 units regular insulin bolus and started on DKA insulin gtt  ? Gap closed x2 and transitioned to non-CC insulin gtt  · Transitioned to basal bolus regimen     Metabolic acidosis  Assessment & Plan  · Severe metabolic acidosis secondary to DKA for medication noncompliance  · Ph on admission 6 86, serum bicarb < 5  · Fluid resuscitated per DKA protocol started and will continue with aggressive fluid resuscitation  · Given 1 amp bicarb in the ED  · resolved     Medically noncompliant  Assessment & Plan  · Reports not taking his home synthroid for over a month and hasn't had his insulin in 5 days  He reports he has these medications at home, he just wasn't taking them  · Will need further education when appropriate on the importance of medication compliance   · Pt also requesting a glucometer for home since he doesn't have one   Care management consulted       Hypothyroidism  Assessment & Plan  · Pt has not taken his home med for over a month  · TSH on admission 24 5  · Resume home levothyroxine     Type 1 diabetes mellitus with hyperglycemia (HCC)  Assessment & Plan            Lab Results   Component Value Date     HGBA1C 12 6 (H) 09/07/2022            Blood Sugar Average: Last 72 hrs:  ·  Medication noncompliance at home, hasn't taken his insulin for 5 days PTA  · Admitted with DKA  · See plan above under DKA     BENJI (acute kidney injury) ([facility] [address] )  Assessment & Plan  · Baseline creat appears to be 0 6-0 8  · Admission creat 2 05  · Likely 2/2 intravascular dehydration in the setting of DKA   · Resolvedwith IVF administration   · UA neg for infection  · Strict I&O   · Avoid hypotension and nephrotoxic agents      Nicotine dependence  Assessment & Plan  · PAD smoker  · Encourage cessation   · Start NRT with patch     Hyponatremia  Assessment & Plan  · Pseudo-hyponatremia  · Na 124 on admission, Corrected for glucose it was 137     Dysphagia  apprec GI   Started on PPI - marked improved on  Given patient eating well, defer EGD/barium swallow studies at this time  Hospital Course: Unknown Quivers is a 36 y o  male patient who originally presented to the hospital on   Admission Orders (From admission, onward)     Ordered        09/07/22 2124  INPATIENT ADMISSION  Once                     due to  DKA from noncompliance  Patient was reinstated on insulin after being treated for DKA  He also had dysphagia, was seen by GI  However patient improved rapidly and is tolerating diet after being started on PPI, suspected patient may have had some esophagitis that improved with PPI therapy as the patient was vomiting significantly  Patient can follow-up with GI services as an outpatient for further testing such as barium swallow if he develops any symptoms but likely appears clinically improved  Please see above list of diagnoses and related plan for additional information       Physical Exam:    GEN: No acute distress, comfortable  HEEENT: No JVD, PERRLA, no scleral icterus  RESP: Lungs clear to auscultation bilaterally  CV: RRR, +s1/s2   ABD: SOFT NON TENDER, POSITIVE BOWEL SOUNDS, NO DISTENTION  PSYCH: CALM  NEURO: A X O X 3, NO FOCAL DEFICITS  SKIN: NO RASH  EXTREM: NO EDEMA      Condition at Discharge:  good      Discharge instructions/Information to patient and family:   See after visit summary for information provided to patient and family  Provisions for Follow-Up Care:  See after visit summary for information related to follow-up care and any pertinent home health orders  Disposition:     Home       Discharge Statement:  I spent 36 minutes discharging the patient  This time was spent on the day of discharge  I had direct contact with the patient on the day of discharge  Greater than 50% of the total time was spent examining patient, answering all patient questions, arranging and discussing plan of care with patient as well as directly providing post-discharge instructions  Additional time then spent on discharge activities  Discharge Medications:  See after visit summary for reconciled discharge medications provided to patient and family        ** Please Note: This note has been constructed using a voice recognition system **

## 2022-09-12 NOTE — PLAN OF CARE
Problem: Potential for Falls  Goal: Patient will remain free of falls  Description: INTERVENTIONS:  - Educate patient/family on patient safety including physical limitations  - Instruct patient to call for assistance with activity   - Consult OT/PT to assist with strengthening/mobility   - Keep Call bell within reach  - Keep bed low and locked with side rails adjusted as appropriate  - Keep care items and personal belongings within reach  - Initiate and maintain comfort rounds  - Make Fall Risk Sign visible to staff  - Offer Toileting every 2 Hours, in advance of need  - Obtain necessary fall risk management equipment: nonskid footwear  - Apply yellow socks and bracelet for high fall risk patients  - Consider moving patient to room near nurses station  Outcome: Progressing     Problem: Nutrition/Hydration-ADULT  Goal: Nutrient/Hydration intake appropriate for improving, restoring or maintaining nutritional needs  Description: Monitor and assess patient's nutrition/hydration status for malnutrition  Collaborate with interdisciplinary team and initiate plan and interventions as ordered  Monitor patient's weight and dietary intake as ordered or per policy  Utilize nutrition screening tool and intervene as necessary  Determine patient's food preferences and provide high-protein, high-caloric foods as appropriate       INTERVENTIONS:  - Monitor oral intake, urinary output, labs, and treatment plans  - Assess nutrition and hydration status and recommend course of action  - Evaluate amount of meals eaten  - Assist patient with eating if necessary   - Allow adequate time for meals  - Recommend/ encourage appropriate diets, oral nutritional supplements, and vitamin/mineral supplements  - Order, calculate, and assess calorie counts as needed  - Recommend, monitor, and adjust tube feedings and TPN/PPN based on assessed needs  - Assess need for intravenous fluids  - Provide specific nutrition/hydration education as appropriate  - Include patient/family/caregiver in decisions related to nutrition  Outcome: Progressing     Problem: MOBILITY - ADULT  Goal: Maintain or return to baseline ADL function  Description: INTERVENTIONS:  -  Assess patient's ability to carry out ADLs; assess patient's baseline for ADL function and identify physical deficits which impact ability to perform ADLs (bathing, care of mouth/teeth, toileting, grooming, dressing, etc )  - Assess/evaluate cause of self-care deficits   - Assess range of motion  - Assess patient's mobility; develop plan if impaired  - Assess patient's need for assistive devices and provide as appropriate  - Encourage maximum independence but intervene and supervise when necessary  - Involve family in performance of ADLs  - Assess for home care needs following discharge   - Consider OT consult to assist with ADL evaluation and planning for discharge  - Provide patient education as appropriate  Outcome: Progressing     Problem: Prexisting or High Potential for Compromised Skin Integrity  Goal: Skin integrity is maintained or improved  Description: INTERVENTIONS:  - Identify patients at risk for skin breakdown  - Assess and monitor skin integrity  - Assess and monitor nutrition and hydration status  - Monitor labs   - Assess for incontinence   - Turn and reposition patient  - Assist with mobility/ambulation  - Relieve pressure over bony prominences  - Avoid friction and shearing  - Provide appropriate hygiene as needed including keeping skin clean and dry  - Evaluate need for skin moisturizer/barrier cream  - Collaborate with interdisciplinary team   - Patient/family teaching  - Consider wound care consult   Outcome: Progressing     Problem: PAIN - ADULT  Goal: Verbalizes/displays adequate comfort level or baseline comfort level  Description: Interventions:  - Encourage patient to monitor pain and request assistance  - Assess pain using appropriate pain scale  - Administer analgesics based on type and severity of pain and evaluate response  - Implement non-pharmacological measures as appropriate and evaluate response  - Consider cultural and social influences on pain and pain management  - Notify physician/advanced practitioner if interventions unsuccessful or patient reports new pain  Outcome: Progressing     Problem: INFECTION - ADULT  Goal: Absence or prevention of progression during hospitalization  Description: INTERVENTIONS:  - Assess and monitor for signs and symptoms of infection  - Monitor lab/diagnostic results  - Monitor all insertion sites, i e  indwelling lines, tubes, and drains  - Monitor endotracheal if appropriate and nasal secretions for changes in amount and color  - [place] appropriate cooling/warming therapies per order  - Administer medications as ordered  - Instruct and encourage patient and family to use good hand hygiene technique  - Identify and instruct in appropriate isolation precautions for identified infection/condition  Outcome: Progressing     Goal: Maintain or return to baseline ADL function  Description: INTERVENTIONS:  -  Assess patient's ability to carry out ADLs; assess patient's baseline for ADL function and identify physical deficits which impact ability to perform ADLs (bathing, care of mouth/teeth, toileting, grooming, dressing, etc )  - Assess/evaluate cause of self-care deficits   - Assess range of motion  - Assess patient's mobility; develop plan if impaired  - Assess patient's need for assistive devices and provide as appropriate  - Encourage maximum independence but intervene and supervise when necessary  - Involve family in performance of ADLs  - Assess for home care needs following discharge   - Consider OT consult to assist with ADL evaluation and planning for discharge  - Provide patient education as appropriate  Outcome: Progressing     Problem: DISCHARGE PLANNING  Goal: Discharge to home or other facility with appropriate resources  Description: INTERVENTIONS:  - Identify barriers to discharge w/patient and caregiver  - Arrange for needed discharge resources and transportation as appropriate  - Identify discharge learning needs (meds, wound care, etc )  - Arrange for interpretive services to assist at discharge as needed  - Refer to Case Management Department for coordinating discharge planning if the patient needs post-hospital services based on physician/advanced practitioner order or complex needs related to functional status, cognitive ability, or social support system  Outcome: Progressing     Problem: Knowledge Deficit  Goal: Patient/family/caregiver demonstrates understanding of disease process, treatment plan, medications, and discharge instructions  Description: Complete learning assessment and assess knowledge base    Interventions:  - Provide teaching at level of understanding  - Provide teaching via preferred learning methods  Outcome: Progressing

## 2022-09-12 NOTE — UTILIZATION REVIEW
Continued Stay Review    Date: 9/12/22                        Current Patient Class: inpatient  Current Level of Care: med surg    HPI:42 y o  male initially admitted on 9/7/22 inpatient due to  DKA  Polysubstance abuse  Stopped taking insulin about 5 days prior to arrival   Non compliant with synthroid  During admission complaint of dysphagia, and suspect esophagitis, started on PPI  Assessment/Plan:   9/12/22  Swallowing improved  Able to eat breakfast   On exam no focal deficits  Glucose 201,  229  Continue Lantus, lispro with meals and SSI/accuchecks  Was to have esophagram, patient wants as OP  Vital Signs:   09/12/22 0752 98 °F (36 7 °C) 70 12 134/70 93 -- -- Sitting   09/11/22 2149 98 2 °F (36 8 °C) 72 16 126/80 96 95 % None (Room air)        Pertinent Labs/Diagnostic Results:   XR chest 1 view portable   ED Interpretation by Thania Rios MD (09/07 2030)   No acute pulmonary pathology      Final Result by Pippa Gerber MD (09/08 0743)      No acute cardiopulmonary disease                    Workstation performed: [id number]           Results from last 7 days   Lab Units 09/07/22 2004   SARS-COV-2  Negative     Results from last 7 days   Lab Units 09/12/22  0623 09/11/22  0417 09/10/22  0549 09/09/22  0403 09/08/22  0350   WBC Thousand/uL 7 95 9 28 7 88 9 47 11 91*   HEMOGLOBIN g/dL 12 8 13 5 13 0 11 9* 11 9*   HEMATOCRIT % 39 8 39 8 37 2 33 9* 33 7*   PLATELETS Thousands/uL 194 300 254 301 339   NEUTROS ABS Thousands/µL  --  3 35 3 43  --  8 95*         Results from last 7 days   Lab Units 09/12/22  0623 09/11/22  0417 09/10/22  0549 09/09/22  2116 09/09/22  0403 09/08/22  2115 09/08/22  1637 09/07/22  2329 09/07/22 2007   SODIUM mmol/L 136 138 135 133* 139 137 138   < >  --    POTASSIUM mmol/L 3 8 3 6 3 6 4 0 3 1* 3 5 3 8   < >  --    CHLORIDE mmol/L 101 102 99 98 106 106 107   < >  --    CO2 mmol/L 27 30 28 27 22 20* 24   < >  --    CO2, I-STAT mmol/L  --   --   --   --   -- --   --   --  <5*   ANION GAP mmol/L 8 6 8 8 11 11 7   < >  --    BUN mg/dL 9 9 5 5 6 7 9   < >  --    CREATININE mg/dL 0 80 0 84 0 70 0 72 0 71 0 78 0 87   < >  --    EGFR ml/min/1 73sq m 109 107 115 114 114 110 105   < >  --    CALCIUM mg/dL 8 5 8 8 8 4 8 1* 7 0* 6 9* 8 0*   < >  --    CALCIUM, IONIZED, ISTAT mmol/L  --   --   --   --   --   --   --   --  1 21   MAGNESIUM mg/dL  --   --  2 6 1 8 2 0 2 2 2 5   < >  --    PHOSPHORUS mg/dL  --   --  3 0 2 2* 2 2* 1 1* 1 0*   < >  --     < > = values in this interval not displayed       Results from last 7 days   Lab Units 09/12/22  0623 09/11/22  0417 09/07/22 2004   AST U/L 66* 40 15   ALT U/L 46 32 29   ALK PHOS U/L 137* 142* 216*   TOTAL PROTEIN g/dL 6 1* 6 4 8 7*   ALBUMIN g/dL 2 1* 2 3* 3 2*   TOTAL BILIRUBIN mg/dL 0 20 0 40 0 60     Results from last 7 days   Lab Units 09/12/22  1059 09/12/22  0718 09/11/22  2142 09/11/22  1540 09/11/22  1110 09/11/22  0718 09/10/22  2107 09/10/22  1559 09/10/22  1110 09/10/22  0712 09/10/22  0257 09/09/22  2019   POC GLUCOSE mg/dl 229* 201* 255* 136 178* 120 193* 253* 192* 198* 168* 221*     Results from last 7 days   Lab Units 09/12/22  0623 09/11/22  0417 09/10/22  0549 09/09/22  2116 09/09/22  0403 09/08/22  2115 09/08/22  1637 09/08/22  1303 09/08/22  0817 09/08/22  0350 09/08/22  0013 09/07/22  2329   GLUCOSE RANDOM mg/dL 222* 107 190* 229* 96 204* 80 85 224* 237* 412* 480*  482*         Results from last 7 days   Lab Units 09/07/22 2004   HEMOGLOBIN A1C % 12 6*   EAG mg/dl 315     BETA-HYDROXYBUTYRATE   Date Value Ref Range Status   09/07/2022 4 2 (H) <0 6 mmol/L Final      Results from last 7 days   Lab Units 09/08/22  0350 09/07/22  2335   PH ART  7 349* 7 095*   PCO2 ART mm Hg 18 2* 13 4*   PO2 ART mm Hg 101 5 99 8   HCO3 ART mmol/L 9 8* 4 0*   BASE EXC ART mmol/L -13 5 -23 5   O2 CONTENT ART mL/dL 17 2 18 5   O2 HGB, ARTERIAL % 97 2* 96 2   ABG SOURCE  Line, Arterial Line, Arterial     Results from last 7 days Lab Units 09/07/22 2007   PH, MARY I-STAT  6 863*   PCO2, MARY ISTAT mm HG 17 1*   PO2, MARY ISTAT mm HG 57 0*   HCO3, MARY ISTAT mmol/L 3 1*   I STAT BASE EXC mmol/L -30*   I STAT O2 SAT % 65     Results from last 7 days   Lab Units 09/07/22 2004   TSH 3RD GENERATON uIU/mL 24 528*     Results from last 7 days   Lab Units 09/07/22 2004   LIPASE u/L 116     Results from last 7 days   Lab Units 09/08/22  1408   CLARITY UA  Clear   COLOR UA  Yellow   SPEC GRAV UA  1 020   PH UA  6 0   GLUCOSE UA mg/dl 250 (1/4%)*   KETONES UA mg/dl 15 (1+)*   BLOOD UA  Small*   PROTEIN UA mg/dl Trace*   NITRITE UA  Negative   BILIRUBIN UA  Negative   UROBILINOGEN UA E U /dl 0 2   LEUKOCYTES UA  Negative   WBC UA /hpf None Seen   RBC UA /hpf 0-1*   BACTERIA UA /hpf None Seen   EPITHELIAL CELLS WET PREP /hpf None Seen   MUCUS THREADS  Occasional*         Medications:   Scheduled Medications:  heparin (porcine), 5,000 Units, Subcutaneous, Q8H [address]  insulin glargine, 25 Units, Subcutaneous, HS  insulin lispro, 1-6 Units, Subcutaneous, TID AC  insulin lispro, 1-6 Units, Subcutaneous, HS  insulin lispro, 5 Units, Subcutaneous, Daily With Breakfast  insulin lispro, 5 Units, Subcutaneous, Daily With Dinner  insulin lispro, 5 Units, Subcutaneous, Daily With Lunch  levothyroxine, 75 mcg, Oral, Early Morning  nicotine, 21 mg, Transdermal, Daily  pantoprazole, 40 mg, Oral, Early Morning  polyethylene glycol, 17 g, Oral, Daily  senna, 1 tablet, Oral, HS      Continuous IV Infusions: none      PRN Meds: not used  acetaminophen, 650 mg, Oral, Q6H PRN  bisacodyl, 10 mg, Rectal, Daily PRN        Discharge Plan: to be determined  Network Utilization Review Department  ATTENTION: Please call with any questions or concerns to [phone] and carefully listen to the prompts so that you are directed to the right person   All voicemails are confidential   Quintin Hutchins all requests for admission clinical reviews, approved or denied determinations and any other requests to dedicated fax number below belonging to the campus where the patient is receiving treatment   List of dedicated fax numbers for the Facilities:  [address] [place] [address] DENIALS (Administrative/Medical Necessity) [phone]   1000  [facility] (Maternity/NICU/Pediatrics) [phone]   [address]  [address] Medical [place] [address] [address] [place] [address] [address] [place] [address] [facility] [place] [address] [phone]

## 2022-09-13 NOTE — UTILIZATION REVIEW
Notification of Discharge   This is a Notification of Discharge from our facility [address]  Please be advised that this patient has been discharge from our facility  Below you will find the admission and discharge date and time including the patients disposition  UTILIZATION REVIEW CONTACT:  Lamar Severe  Utilization   Network Utilization Review Department  Phone: [phone] carefully listen to the prompts  All voicemails are confidential   Email: [email or web address]     PHYSICIAN ADVISORY SERVICES:  FOR [id number] REVIEW - MEDICAL NECESSITY DENIAL  Phone: [phone]  Fax: [phone]  Email: [email or web address]     PRESENTATION DATE: 9/7/2022  7:46 PM  OBERVATION ADMISSION DATE:   INPATIENT ADMISSION DATE: 9/7/22  9:24 PM   DISCHARGE DATE: 9/12/2022  4:12 PM  DISPOSITION: Home/Self Care Home/Self Care      IMPORTANT INFORMATION:  Send all requests for admission clinical reviews, approved or denied determinations and any other requests to dedicated fax number below belonging to the campus where the patient is receiving treatment   List of dedicated fax numbers:  [address] DENIALS (Administrative/Medical Necessity) [phone]   1000  16[facility] (Maternity/NICU/Pediatrics) [phone]   Miriam Handing [phone]   130 [facility] [phone]   [address] [place] [address] [phone]   2000 [facility] [address] [place] [address],[address] [phone]   [facility]  [phone]   220[address] [facility] [address] [facility]  [address] [phone]

## 2022-10-10 ENCOUNTER — OFFICE VISIT (OUTPATIENT)
Dept: FAMILY MEDICINE CLINIC | Facility: CLINIC | Age: 42
End: 2022-10-10
Payer: COMMERCIAL

## 2022-10-10 VITALS
HEART RATE: 90 BPM | WEIGHT: 167 LBS | SYSTOLIC BLOOD PRESSURE: 136 MMHG | BODY MASS INDEX: 23.91 KG/M2 | RESPIRATION RATE: 16 BRPM | DIASTOLIC BLOOD PRESSURE: 72 MMHG | HEIGHT: 70 IN | OXYGEN SATURATION: 99 %

## 2022-10-10 DIAGNOSIS — E11.10 DKA (DIABETIC KETOACIDOSIS) (HCC): ICD-10-CM

## 2022-10-10 DIAGNOSIS — N17.9 AKI (ACUTE KIDNEY INJURY) (HCC): ICD-10-CM

## 2022-10-10 DIAGNOSIS — E03.9 HYPOTHYROIDISM: ICD-10-CM

## 2022-10-10 DIAGNOSIS — R13.10 DYSPHAGIA: ICD-10-CM

## 2022-10-10 DIAGNOSIS — E10.10 DIABETIC KETOACIDOSIS WITHOUT COMA ASSOCIATED WITH TYPE 1 DIABETES MELLITUS (HCC): ICD-10-CM

## 2022-10-10 DIAGNOSIS — E10.65 TYPE 1 DIABETES MELLITUS WITH HYPERGLYCEMIA (HCC): Primary | ICD-10-CM

## 2022-10-10 PROCEDURE — 99243 OFF/OP CNSLTJ NEW/EST LOW 30: CPT | Performed by: NURSE PRACTITIONER

## 2022-10-10 RX ORDER — PEN NEEDLE, DIABETIC 32GX 5/32"
NEEDLE, DISPOSABLE MISCELLANEOUS
Qty: 100 EACH | Refills: 0 | Status: SHIPPED | OUTPATIENT
Start: 2022-10-10

## 2022-10-10 RX ORDER — LANCETS 33 GAUGE
EACH MISCELLANEOUS
Qty: 200 EACH | Refills: 0 | Status: SHIPPED | OUTPATIENT
Start: 2022-10-10

## 2022-10-10 RX ORDER — INSULIN ASPART 100 [IU]/ML
5 INJECTION, SOLUTION INTRAVENOUS; SUBCUTANEOUS
Qty: 6 ML | Refills: 0 | Status: SHIPPED | OUTPATIENT
Start: 2022-10-10 | End: 2022-10-17

## 2022-10-10 RX ORDER — BLOOD SUGAR DIAGNOSTIC
STRIP MISCELLANEOUS
Qty: 200 EACH | Refills: 0 | Status: SHIPPED | OUTPATIENT
Start: 2022-10-10

## 2022-10-10 RX ORDER — PANTOPRAZOLE SODIUM 40 MG/1
40 TABLET, DELAYED RELEASE ORAL
Qty: 90 TABLET | Refills: 0 | Status: SHIPPED | OUTPATIENT
Start: 2022-10-10

## 2022-10-10 RX ORDER — LEVOTHYROXINE SODIUM 0.07 MG/1
75 TABLET ORAL DAILY
Qty: 90 TABLET | Refills: 0 | Status: SHIPPED | OUTPATIENT
Start: 2022-10-10

## 2022-10-10 NOTE — PROGRESS NOTES
[facility]        NAME: Magaly Choudhary is a 36 y o  male  : 1980    MRN: [id number]  DATE: October 10, 2022  TIME: 4:05 PM    Assessment and Plan   Type 1 diabetes mellitus with hyperglycemia ([facility] [address] ) [E10 65]  1  Type 1 diabetes mellitus with hyperglycemia [facility])  Ambulatory Referral to Endocrinology   2  BENJI (acute kidney injury) ([facility] [address] )     3  Diabetic ketoacidosis without coma associated with type 1 diabetes mellitus [facility])  Ambulatory Referral to Endocrinology   4  Hypothyroidism  levothyroxine 75 mcg tablet    TSH, 3rd generation with Free T4 reflex    TSH, 3rd generation with Free T4 reflex   5  DKA (diabetic ketoacidosis) ([facility])  insulin aspart (NovoLOG FlexPen) 100 UNIT/ML injection pen    OneTouch Delica Lancets 10G MISC    glucose blood (OneTouch Verio) test strip    Insulin Pen Needle (BD Pen Needle Mariela 2nd Gen) 32G X 4 MM MISC   6  Dysphagia  pantoprazole (PROTONIX) 40 mg tablet         Patient Instructions     Patient Instructions   Continue to monitor your glucose levels and Continue Insulin as ordered  Continue Levothyroxine as ordered for hypothyroidism  Recheck TSH in 2-4 weeks  Diabetic diet as discussed-decreased sugars, carbs and fats in your diet  Schedule appointment with Endocrine and GI for hospital follow up  Call with any problems/concerns  Schedule appointment for annual physical             Chief Complaint     Chief Complaint   Patient presents with   • New Patient Visit         History of Present Illness       New patient establish care-recent hospital stay  hx of diabetes, hypothyroidism  He was admitted to [address] [place] [address] 22-22 for diabetic ketoacidosis  He had stopped taking his insulin-noncompliance with medication  HA1C on admission 12  6  He has restarted on insulin  He is using Novolog TID with meals and Lantus 25 units at bedtime  He had also stopped taking his levothyroxine-tsh on admission was 24  5  he has resumed taking Levothyroxine 75 mcg daily  He was referred by hospital to f/up with endocrine  He was seen by GI in the hospital for c/o dysphagia  Started on Protonix  Patient states no swallowing issues since starting on medication  Hx of polysubstance abuse-states stopped using a few months ago  Being seen at [facility] for intensive out patient treatment  Review of Systems   Review of Systems   Constitutional: Negative for activity change, diaphoresis and fever  HENT: Negative for congestion, sore throat and trouble swallowing  Eyes: Negative for visual disturbance  Respiratory: Negative for chest tightness, shortness of breath and wheezing  Cardiovascular: Negative for chest pain, palpitations and leg swelling  Gastrointestinal: Negative for abdominal pain and nausea  Endocrine: Negative for cold intolerance, heat intolerance, polydipsia, polyphagia and polyuria  Skin: Negative for color change and pallor  Neurological: Negative for dizziness, weakness, light-headedness and headaches  Psychiatric/Behavioral: Negative for dysphoric mood  Current Medications       Current Outpatient Medications:   •  Alcohol Swabs 70 % PADS, May substitute brand based on insurance coverage  Check glucose ACHS  , Disp: 200 each, Rfl: 0  •  Blood Glucose Monitoring Suppl (OneTouch Verio Reflect) w/Device KIT, May substitute brand based on insurance coverage  Check glucose ACHS  , Disp: 1 kit, Rfl: 0  •  glucose blood (OneTouch Verio) test strip, May substitute brand based on insurance coverage  Check glucose ACHS  , Disp: 200 each, Rfl: 0  •  insulin aspart (NovoLOG FlexPen) 100 UNIT/ML injection pen, Inject 5 Units under the skin 3 (three) times a day with meals, Disp: 6 mL, Rfl: 0  •  Insulin Glargine Solostar (Basaglar KwikPen) 100 UNIT/ML SOPN, Inject 0 25 mL (25 Units total) under the skin daily at bedtime, Disp: 9 mL, Rfl: 0  •  Insulin Pen Needle (BD Pen Needle Mariela 2nd Gen) 32G X 4 MM MISC, For use with insulin pen  Pharmacy may dispense brand covered by insurance , Disp: 100 each, Rfl: 0  •  Insulin Pen Needle (BD Pen Needle Mariela 2nd Gen) 32G X 4 MM MISC, For use with insulin pen  Pharmacy may dispense brand covered by insurance , Disp: 100 each, Rfl: 0  •  levothyroxine 75 mcg tablet, Take 1 tablet (75 mcg total) by mouth daily, Disp: 90 tablet, Rfl: 0  •  OneTouch Delica Lancets 44S MISC, May substitute brand based on insurance coverage  Check glucose ACHS  , Disp: 200 each, Rfl: 0  •  pantoprazole (PROTONIX) 40 mg tablet, Take 1 tablet (40 mg total) by mouth daily in the early morning, Disp: 90 tablet, Rfl: 0    Current Allergies     Allergies as of 10/10/2022   • (No Known Allergies)            The following portions of the patient's history were reviewed and updated as appropriate: allergies, current medications, past family history, past medical history, past social history, past surgical history and problem list      Past Medical History:   Diagnosis Date   • [facility]    • Disease of thyroid gland        History reviewed  No pertinent surgical history  Family History   Problem Relation Age of Onset   • Hypertension Mother    • Diabetes Sister    • Diabetes Daughter          Medications have been verified  Objective   /72   Pulse 90   Resp 16   Ht 5' 10" (1 778 m)   Wt 75 8 kg (167 lb)   SpO2 99%   BMI 23 96 kg/m²        Physical Exam     Physical Exam  Vitals and nursing note reviewed  Constitutional:       General: He is not in acute distress  Appearance: Normal appearance  He is normal weight  He is not ill-appearing or diaphoretic  HENT:      Head: Normocephalic  Eyes:      Extraocular Movements: Extraocular movements intact  Cardiovascular:      Rate and Rhythm: Normal rate and regular rhythm  Pulses: no weak pulses          Dorsalis pedis pulses are 1+ on the right side and 1+ on the left side          Posterior tibial pulses are 1+ on the right side and 1+ on the left side  Heart sounds: Normal heart sounds  No murmur heard  No friction rub  No gallop  Pulmonary:      Effort: Pulmonary effort is normal  No respiratory distress  Breath sounds: Normal breath sounds  No wheezing or rhonchi  Chest:      Chest wall: No tenderness  Musculoskeletal:         General: Normal range of motion  Cervical back: Normal range of motion and neck supple  Feet:      Right foot:      Skin integrity: No ulcer, skin breakdown, erythema, warmth, callus or dry skin  Left foot:      Skin integrity: No ulcer, skin breakdown, erythema, warmth, callus or dry skin  Skin:     General: Skin is warm and dry  Coloration: Skin is not pale  Findings: No erythema  Neurological:      Mental Status: He is alert and oriented to person, place, and time  Psychiatric:         Mood and Affect: Mood normal          Behavior: Behavior normal          Thought Content: Thought content normal          Judgment: Judgment normal            PHQ-2/9 Depression Screening    Little interest or pleasure in doing things: 0 - not at all  Feeling down, depressed, or hopeless: 0 - not at all  PHQ-2 Score: 0  PHQ-2 Interpretation: Negative depression screen       Patient's shoes and socks removed  Right Foot/Ankle   Right Foot Inspection  Skin Exam: skin normal and skin intact  No dry skin, no warmth, no callus, no erythema, no maceration, no abnormal color, no pre-ulcer, no ulcer and no callus  Toe Exam: ROM and strength within normal limits  No swelling, no tenderness, erythema and  no right toe deformity    Sensory   Monofilament testing: intact    Vascular  Capillary refills: < 3 seconds  The right DP pulse is 1+  The right PT pulse is 1+  Left Foot/Ankle  Left Foot Inspection  Skin Exam: skin normal and skin intact  No dry skin, no warmth, no erythema, no maceration, normal color, no pre-ulcer, no ulcer and no callus       Toe Exam: ROM and strength within normal limits  No swelling, no tenderness, no erythema and no left toe deformity  Sensory   Monofilament testing: intact    Vascular  Capillary refills: < 3 seconds  The left DP pulse is 1+  The left PT pulse is 1+       Assign Risk Category  No deformity present  Loss of protective sensation  No weak pulses  Risk: 1

## 2022-10-10 NOTE — PATIENT INSTRUCTIONS
Continue to monitor your glucose levels and Continue Insulin as ordered  Continue Levothyroxine as ordered for hypothyroidism  Recheck TSH in 2-4 weeks  Diabetic diet as discussed-decreased sugars, carbs and fats in your diet  Schedule appointment with Endocrine and GI for hospital follow up  Call with any problems/concerns      Schedule appointment for annual physical

## 2022-10-17 ENCOUNTER — OFFICE VISIT (OUTPATIENT)
Dept: ENDOCRINOLOGY | Facility: HOSPITAL | Age: 42
End: 2022-10-17
Payer: COMMERCIAL

## 2022-10-17 VITALS
DIASTOLIC BLOOD PRESSURE: 82 MMHG | SYSTOLIC BLOOD PRESSURE: 122 MMHG | WEIGHT: 163 LBS | HEIGHT: 70 IN | BODY MASS INDEX: 23.34 KG/M2

## 2022-10-17 DIAGNOSIS — E10.10 DIABETIC KETOACIDOSIS WITHOUT COMA ASSOCIATED WITH TYPE 1 DIABETES MELLITUS (HCC): ICD-10-CM

## 2022-10-17 DIAGNOSIS — E11.10 DKA (DIABETIC KETOACIDOSIS) (HCC): ICD-10-CM

## 2022-10-17 DIAGNOSIS — E06.3 HYPOTHYROIDISM DUE TO HASHIMOTO'S THYROIDITIS: Primary | ICD-10-CM

## 2022-10-17 DIAGNOSIS — E10.65 TYPE 1 DIABETES MELLITUS WITH HYPERGLYCEMIA (HCC): ICD-10-CM

## 2022-10-17 DIAGNOSIS — E03.8 HYPOTHYROIDISM DUE TO HASHIMOTO'S THYROIDITIS: Primary | ICD-10-CM

## 2022-10-17 PROCEDURE — 99205 OFFICE O/P NEW HI 60 MIN: CPT | Performed by: STUDENT IN AN ORGANIZED HEALTH CARE EDUCATION/TRAINING PROGRAM

## 2022-10-17 RX ORDER — INSULIN ASPART 100 [IU]/ML
8 INJECTION, SOLUTION INTRAVENOUS; SUBCUTANEOUS
Qty: 6 ML | Refills: 0 | Status: SHIPPED | OUTPATIENT
Start: 2022-10-17

## 2022-10-17 RX ORDER — URINE ACETONE TEST STRIPS
1 STRIP MISCELLANEOUS AS NEEDED
Qty: 25 EACH | Refills: 0 | Status: SHIPPED | OUTPATIENT
Start: 2022-10-17

## 2022-10-17 RX ORDER — INSULIN GLARGINE 100 [IU]/ML
30 INJECTION, SOLUTION SUBCUTANEOUS
Qty: 9 ML | Refills: 0 | Status: SHIPPED | OUTPATIENT
Start: 2022-10-17

## 2022-10-17 NOTE — PROGRESS NOTES
New Patient Consult Note      Chief Complaint   Patient presents with   • Diabetes Mellitus   • Hypothyroidism      Referring Provider  Lane Luther  [address]  [address]  [facility],  [address]     History of Present Illness:   Mary Contreras is a 36 y o  male with a history of presumed type 1 diabetes diagnosed 6 years ago prior to him being incarcerated, hypothyroidism, prior history of substance abuse, current smoker who recently was admitted to [facility] in 09/2022 for DKA after he stopped taking his insulin all together since he wasn't eating much  Of note patient also had history of uncontrolled hypothyroidism as well  Patient indicates he had been on insulin since beginning of his diagnosis and always had bad diabetic control, attributes this to poor dietary habits  Previously on NPH/R and on premix 70/30 while incarcerated for 2 years  Reports having BG in 300-400 range even when incarcerated  He does not remember any dosing for premix insulin  Patient previously had DKA initially during diagnosis and this was his second DKA since  Previously HbA1C of >15 5% 2 years ago- pre incarceration and most recently was 12 6%  From hospital, discharged on Lantus 25u and Novolog 5 with meals  Patient also recommended to be adherent to his levothyroxine 75mcg daily  Last TSH 24 and freeT4 0 4 09/2022  No SENIA, IA-2 or ZnT8 Ab seen on file  Patient here to establish care for his diabetes and thyroid management  Patient reports compliance to his MDI, does not miss any insulin shots  Does report taking Lantus 25u at night and 5u novolog 5 mins before meal  Checks BG premeal and reportedly still elevated on current regime- 300-400 range  Lowest BG in past 2 weeks was 106 this morning  Patient injects himself in abdomen mainly on right abdomen and sometimes hands  Does rotate sites but mainly abdomen  Denies feeling any lumps/bumps in area  Denies any insulin leakage when injecting   Does store Lantus at home, however takes Novolog with him to work- stores in pocket or in truck  Currently reports weight loss of 40lbs since his hospitalization when he weighed 200lbs, currently 163lbs  Denies any polyuria, polydipsia or hyperphagia  Reports poor dietary indiscretion- eating candies, chips and everything else he can get hands on  Does not report food insecurity or access to food but simple because he likes this  Activity also very limited, works 3 days a week 8hrs but rest of the time when home doesn't have much physical activity  Home blood glucose readings:   TIR 6%, 91% very high >250, No low BG  Readings per day 2 5  Lowest  this AM      Current regimen: Lantus 30u, Novolog 5u with meals  Hypoglycemic episodes: No rare  H/o of hypoglycemia causing hospitalization or Intervention such as glucagon injection  or ambulance call :  No  Hypoglycemia symptoms: none  Treatment of hypoglycemia:   Medic alert tag: recommended: No  Diabetes education: No Date   Opthamology: sees Last year- was incarcerated and saw doctor there  Denies any retinopathy  Thyroid disorders: Yes, currently on lower than weight based dose, last TSH elevated with low FT4, no changes to levothyroxine made rather adherence recommended  Been on current dose of 75mcg levothyroxine for past month  Takes it first thing in morning, empty stomach and waits 30min-1hr prior to taking other medications/eating  Denies missing any doses  Filled his prescription last week     History of pancreatitis: None    Patient Active Problem List   Diagnosis   • Type 1 diabetes mellitus with hyperglycemia (HCC)   • Heroin use   • Hyponatremia   • Transaminitis   • Nicotine dependence   • Hand laceration   • Hypothyroidism   • Head injury   • Methamphetamine abuse (HCC)   • Hypokalemia   • Diabetic ketoacidosis without coma (HCC)   • Medically noncompliant   • Metabolic acidosis   • BENJI (acute kidney injury) ([facility] [address] )      Past Medical History:   Diagnosis Date   • Diabetes mellitus ([facility] [address] )    • Disease of thyroid gland       History reviewed  No pertinent surgical history  Family History   Problem Relation Age of Onset   • Hypertension Mother    • Diabetes Sister    • Diabetes Daughter      Social History     Tobacco Use   • Smoking status: Current Every Day Smoker     Packs/day: 1 00     Years: 20 00     Pack years: 20 00     Types: Cigarettes   • Smokeless tobacco: Never Used   Substance Use Topics   • Alcohol use: Not Currently     Comment: ocassionally     No Known Allergies      Current Outpatient Medications:   •  acetone, urine, test (Ketostix) strip, Use 1 strip as needed for high blood sugar, Disp: 25 each, Rfl: 0  •  Alcohol Swabs 70 % PADS, May substitute brand based on insurance coverage  Check glucose ACHS  , Disp: 200 each, Rfl: 0  •  Blood Glucose Monitoring Suppl (OneTouch Verio Reflect) w/Device KIT, May substitute brand based on insurance coverage  Check glucose ACHS  , Disp: 1 kit, Rfl: 0  •  glucose blood (OneTouch Verio) test strip, May substitute brand based on insurance coverage  Check glucose ACHS  , Disp: 200 each, Rfl: 0  •  insulin aspart (NovoLOG FlexPen) 100 UNIT/ML injection pen, Inject 8 Units under the skin 3 (three) times a day with meals, Disp: 6 mL, Rfl: 0  •  Insulin Glargine Solostar (Basaglar KwikPen) 100 UNIT/ML SOPN, Inject 0 3 mL (30 Units total) under the skin daily at bedtime, Disp: 9 mL, Rfl: 0  •  Insulin Pen Needle (BD Pen Needle Mariela 2nd Gen) 32G X 4 MM MISC, For use with insulin pen  Pharmacy may dispense brand covered by insurance , Disp: 100 each, Rfl: 0  •  Insulin Pen Needle (BD Pen Needle Mariela 2nd Gen) 32G X 4 MM MISC, For use with insulin pen  Pharmacy may dispense brand covered by insurance , Disp: 100 each, Rfl: 0  •  levothyroxine 75 mcg tablet, Take 1 tablet (75 mcg total) by mouth daily, Disp: 90 tablet, Rfl: 0  •  OneTouch Delica Lancets 14G MISC, May substitute brand based on insurance coverage  Check glucose ACHS  , Disp: 200 each, Rfl: 0  •  pantoprazole (PROTONIX) 40 mg tablet, Take 1 tablet (40 mg total) by mouth daily in the early morning, Disp: 90 tablet, Rfl: 0  Review of Systems   Constitutional: Positive for unexpected weight change  Negative for activity change, appetite change, diaphoresis and fatigue  HENT: Negative for trouble swallowing and voice change  Eyes: Negative for photophobia and visual disturbance  Respiratory: Negative for cough, chest tightness and shortness of breath  Cardiovascular: Negative for chest pain and palpitations  Gastrointestinal: Negative for constipation and diarrhea  Endocrine: Negative for cold intolerance, heat intolerance, polydipsia, polyphagia and polyuria  Musculoskeletal: Negative for arthralgias and myalgias  Skin: Negative  Neurological: Negative for syncope, weakness, light-headedness and numbness  Physical Exam:  Body mass index is 23 39 kg/m²  /82   Ht 5' 10" (1 778 m)   Wt 73 9 kg (163 lb)   BMI 23 39 kg/m²    Wt Readings from Last 3 Encounters:   10/17/22 73 9 kg (163 lb)   10/10/22 75 8 kg (167 lb)   09/12/22 73 6 kg (162 lb 4 1 oz)       Physical Exam  Constitutional:       Appearance: Normal appearance  He is normal weight  Cardiovascular:      Rate and Rhythm: Regular rhythm  Tachycardia present  Pulses: no weak pulses          Dorsalis pedis pulses are 1+ on the right side and 1+ on the left side  Heart sounds: Normal heart sounds  Pulmonary:      Effort: Pulmonary effort is normal    Abdominal:      General: Abdomen is flat  Bowel sounds are normal       Palpations: Abdomen is soft  Comments: No lipohypetrophy or lipoatrophy palpated   Musculoskeletal:      Cervical back: Normal range of motion and neck supple  Feet:      Right foot:      Skin integrity: No ulcer, skin breakdown, erythema, warmth, callus or dry skin        Left foot:      Skin integrity: No ulcer, skin breakdown, erythema, warmth, callus or dry skin    Skin:     General: Skin is warm and dry  Capillary Refill: Capillary refill takes less than 2 seconds  Neurological:      General: No focal deficit present  Mental Status: He is alert and oriented to person, place, and time  Psychiatric:         Mood and Affect: Mood normal          Behavior: Behavior normal        Patient's shoes and socks removed  Right Foot/Ankle   Right Foot Inspection  Skin Exam: skin normal and skin intact  No dry skin, no warmth, no callus, no erythema, no maceration, no abnormal color, no pre-ulcer, no ulcer and no callus  Toe Exam: ROM and strength within normal limits  Sensory   Vibration: intact  Monofilament testing: intact    Vascular  Capillary refills: < 3 seconds  The right DP pulse is 1+  Left Foot/Ankle  Left Foot Inspection  Skin Exam: skin normal and skin intact  No dry skin, no warmth, no erythema, no maceration, normal color, no pre-ulcer, no ulcer and no callus  Toe Exam: ROM and strength within normal limits  Sensory   Vibration: intact  Monofilament testing: intact    Vascular  Capillary refills: < 3 seconds  The left DP pulse is 1+  Assign Risk Category  No deformity present  No loss of protective sensation  No weak pulses  Risk: 0      Labs:         Lab Results   Component Value Date    ALT 46 09/12/2022    AST 66 (H) 09/12/2022    ALKPHOS 137 (H) 09/12/2022       Lab Results   Component Value Date    FREET4 0 49 (L) 09/07/2022      Ref Range & Units 9/7/22  8:04 PM 5/14/20  2:20 PM 5/7/20  9:28 PM 4/19/20  9:05 AM   TSH 3RD GENERATON 0 450 - 4 500 uIU/mL 24 528 High   18 700 High  R, CM  12 024 High  R, CM  17 906 High  R, CM        Impression:  1  Hypothyroidism due to Hashimoto's thyroiditis    2  Type 1 diabetes mellitus with hyperglycemia (HCC)    3  Diabetic ketoacidosis without coma associated with type 1 diabetes mellitus ([facility] [address] )    4   DKA (diabetic ketoacidosis) ([email or web address] )           Plan:    Yanelis Perry was seen today for diabetes mellitus and hypothyroidism  Diagnoses and all orders for this visit:    Hypothyroidism due to Hashimoto's thyroiditis  -     TSH, 3rd generation with Free T4 reflex; Future  -     TSH, 3rd generation with Free T4 reflex  -     Vitamin D 25 hydroxy Lab Collect; Future  -     Vitamin D 25 hydroxy Lab Collect    Type 1 diabetes mellitus with hyperglycemia (HCC)  -     GAD65, IA-2, and Insulin Autoantibody; Future  -     Zinc Transporter 8 (Znt8) Antibody; Future  -     Ambulatory Referral to Endocrinology  -     Leptin; Future  -     C-peptide Lab Collect; Future  -     Glucose, fasting Lab Collect; Future  -     Ambulatory referral to Diabetic Education; Future  -     Leptin  -     C-peptide Lab Collect  -     Glucose, fasting Lab Collect  -     acetone, urine, test (Ketostix) strip; Use 1 strip as needed for high blood sugar  -     Lipid panel- Lab Collect; Future  -     Lipid panel- Lab Collect    Diabetic ketoacidosis without coma associated with type 1 diabetes mellitus ([facility] )  -     Ambulatory Referral to Endocrinology  -     SALIVARY CORTISOL,ONE SPECIMEN; Future  -     SALIVARY CORTISOL,ONE SPECIMEN    DKA (diabetic ketoacidosis) ([facility])    Other orders  -     Insulin Glargine Solostar (Basaglar KwikPen) 100 UNIT/ML SOPN; Inject 0 3 mL (30 Units total) under the skin daily at bedtime  -     insulin aspart (NovoLOG FlexPen) 100 UNIT/ML injection pen; Inject 8 Units under the skin 3 (three) times a day with meals      Patient is a 42yM with history of DM diagnosed in his late 29's with 2 episodes of DKA thus far with severe hyperglycemia and high insulin resistance despite having a normal BMI, without other metabolic syndrome such as hypertension, (unknow lipid) and also hypothyroidism which again is poorly controlled comes today to establish care       Given patients history, lack of metabolic syndrome and 2 episodes of DKA most likely has T1DM vs [place] (diagnosed after age [place]), however SENIA, ZnT8, IA-2 not on file, I will order these and also C-peptide/glucose to screen for this  Regardless of etiology his diabetes is very poorly controlled with significant hyperglycemia leading to cachexia and weight loss without reported polyuria/polydipsia and blurry vision  We reviewed technique of insulin injection- doesn't seem to have leaking insulin, does not appear to have lipohypertrophy at injection site  Does store insulin inappropriately which maybe 1 potential cause- however only seen for Novolog, Lantus stored at home and therefore less likely to be inactivated  Given hyperglycemia in 300-400 range throughout the day and primarily with meals will increase Lantus dose by 20% and Novolog by 30% to lantus 30u and Novolog 8u with each meal  Also discussed giving Novolog 10-15 mins before meal and not right after  I will also get him to see our CDE as well for further education related to diet, exercise and review other insulin techniques  Given sever insulin resistance will also screen for ?lipodystrophy although not clinically seen on exam  Leptin levels will be checked  Patient does not appear cushingoid, however did have low normal potassium while in hospital admitted for DKA  Will screen for cushing's disease just because of this as well  Will order salivary midnight cortisol  Will also clarify with pharmacy about prescription refills and picking up supplies  We did discuss that BG were controlled in high 100-200 range on Lantus 25 and Novolog 5u while in hospital, so Im somewhat worried he may not be taking insulin either as prescribed or taking it accurately or insulin is deactivated  Will increase doses for now and also work on trouble shooting reasons for such high insulin resistance     Screening:- Uptodate on retinopathy  Will order urine micro-albumin and Lipid panel   Patient >36years of age will need to start Lipitor next visit     HbA1C goal <7%    Hypothyroidism:- Patient appears to be on lower than weight based dosing for his levothyroxine only at 75mcg which should be on 112mcg instead  Regardless his TSH has been high 12-24 (highest currently) with low FreeT4  Patient also stopped taking his medication prior to hospitalization when labs checked  Unsure history of medication compliance and thyroid control while incarcerated  Given that he reportedly has been taking the levothyroxine now for past month will simply repeat his TSH, if high will adjust to weight based dosing and if still unable to improve control will discuss doing an absorption testing  I will also check vitD levels as alternative for malabsorption screening  RTC in 6 weeks  Discussed with the patient and all questioned fully answered  He will call me if any problems arise  Counseled patient on diagnostic results, prognosis, risk and benefit of treatment options, instruction for management, importance of treatment compliance, Risk  factor reduction and impressions    I have spent 60 minutes with Patient today in which greater than 50% of this time was spent in counseling/coordination of care regarding Diagnostic results, Prognosis, Risks and benefits of tx options, Intructions for management, Patient and family education and Importance of tx compliance          Hailey [facility] MD

## 2022-10-18 ENCOUNTER — TELEPHONE (OUTPATIENT)
Dept: DIABETES SERVICES | Facility: CLINIC | Age: 42
End: 2022-10-18

## 2022-10-18 NOTE — TELEPHONE ENCOUNTER
Dr Nnamdi Orourke,  I just wanted to clarify the diabetes education referral you placed for this patient  What services would you like for this patient with the educator? Thank you

## 2022-11-06 PROBLEM — E11.10 DIABETIC KETOACIDOSIS WITHOUT COMA (HCC): Status: RESOLVED | Noted: 2022-09-07 | Resolved: 2022-11-06

## 2022-11-10 DIAGNOSIS — E11.10 DKA (DIABETIC KETOACIDOSIS) (HCC): ICD-10-CM

## 2022-11-10 RX ORDER — BLOOD SUGAR DIAGNOSTIC
STRIP MISCELLANEOUS
Qty: 200 EACH | Refills: 0 | Status: SHIPPED | OUTPATIENT
Start: 2022-11-10

## 2022-11-11 LAB
CHOLEST SERPL-MCNC: 223 MG/DL (ref 100–199)
CHOLEST/HDLC SERPL: 3.2 RATIO (ref 0–5)
HDLC SERPL-MCNC: 70 MG/DL
LDLC SERPL CALC-MCNC: 127 MG/DL (ref 0–99)
SL AMB VLDL CHOLESTEROL CALC: 26 MG/DL (ref 5–40)
TRIGL SERPL-MCNC: 148 MG/DL (ref 0–149)

## 2022-11-15 ENCOUNTER — TELEPHONE (OUTPATIENT)
Dept: ENDOCRINOLOGY | Facility: HOSPITAL | Age: 42
End: 2022-11-15

## 2022-11-15 ENCOUNTER — DOCUMENTATION (OUTPATIENT)
Dept: ENDOCRINOLOGY | Facility: HOSPITAL | Age: 42
End: 2022-11-15

## 2022-11-15 NOTE — PROGRESS NOTES
The patient sent in his blood sugars and they will be scanned into his chart and a copy will be on your desk as well for review

## 2022-11-15 NOTE — TELEPHONE ENCOUNTER
I was able to make the patient aware of what you had reviewed and he will be doing the cortisol testing soon  He will be emailing a list of his sugars hopefully today into tomorrow

## 2022-11-15 NOTE — TELEPHONE ENCOUNTER
I was able to get a portion on the patient's paperwork there is still more pending and will be sent when ready  A copy is on your desk for review as well as sent to Ryan1 SIMRAN Dyer to be entered into his chart

## 2022-11-17 RX ORDER — INSULIN GLARGINE 100 [IU]/ML
40 INJECTION, SOLUTION SUBCUTANEOUS
Qty: 9 ML | Refills: 0 | Status: SHIPPED | OUTPATIENT
Start: 2022-11-17 | End: 2022-11-18 | Stop reason: SDUPTHER

## 2022-11-17 RX ORDER — INSULIN ASPART 100 [IU]/ML
12 INJECTION, SOLUTION INTRAVENOUS; SUBCUTANEOUS
Qty: 6 ML | Refills: 0 | Status: SHIPPED | OUTPATIENT
Start: 2022-11-17 | End: 2022-11-18 | Stop reason: SDUPTHER

## 2022-11-18 ENCOUNTER — TELEPHONE (OUTPATIENT)
Dept: ENDOCRINOLOGY | Facility: HOSPITAL | Age: 42
End: 2022-11-18

## 2022-11-18 DIAGNOSIS — E11.10 DKA (DIABETIC KETOACIDOSIS) (HCC): ICD-10-CM

## 2022-11-18 DIAGNOSIS — E10.65 TYPE 1 DIABETES MELLITUS WITH HYPERGLYCEMIA (HCC): Primary | ICD-10-CM

## 2022-11-18 RX ORDER — INSULIN GLARGINE 100 [IU]/ML
40 INJECTION, SOLUTION SUBCUTANEOUS
Qty: 15 ML | Refills: 4 | Status: SHIPPED | OUTPATIENT
Start: 2022-11-18

## 2022-11-18 RX ORDER — INSULIN ASPART 100 [IU]/ML
12 INJECTION, SOLUTION INTRAVENOUS; SUBCUTANEOUS
Qty: 15 ML | Refills: 4 | Status: SHIPPED | OUTPATIENT
Start: 2022-11-18

## 2022-11-18 RX ORDER — PEN NEEDLE, DIABETIC 32GX 5/32"
NEEDLE, DISPOSABLE MISCELLANEOUS
Qty: 400 EACH | Refills: 3 | Status: SHIPPED | OUTPATIENT
Start: 2022-11-18

## 2022-11-18 NOTE — TELEPHONE ENCOUNTER
I attempted to call the patient concerning results and I had to leave a voicemail for him to call the office back

## 2022-11-18 NOTE — TELEPHONE ENCOUNTER
----- Message from Jluis Richmond MD sent at 11/17/2022  4:43 PM EST -----  Reviewed patients BG  Still appears to have significantly elevated BG compared to what his readings were in the hospital  Has he been working towards eating a better diet and avoiding the soda, take outs etc?     Given significantly elevated BG, would recommend Lantus 40u and Basaglar 12u with each meal for now  Please send me reading in 2 weeks again for further titration  Highly stress importance of diet control       Gila Brown

## 2022-11-19 LAB
25(OH)D3+25(OH)D2 SERPL-MCNC: 30.1 NG/ML (ref 30–100)
C PEPTIDE SERPL-MCNC: 0.9 NG/ML (ref 1.1–4.4)
GAD65 AB SER IA-ACNC: 19 U/ML
GLUCOSE SERPL-MCNC: 431 MG/DL (ref 70–99)
INSULIN AB SER-ACNC: <5 UU/ML
LEPTIN SERPL-MCNC: 1.4 NG/ML
SL AMB T4, FREE (DIRECT): 1.02 NG/DL (ref 0.82–1.77)
TSH SERPL DL<=0.005 MIU/L-ACNC: 13.8 UIU/ML (ref 0.45–4.5)
ZNT8 AB SERPL IA-ACNC: <15 U/ML

## 2022-11-22 DIAGNOSIS — E03.9 HYPOTHYROIDISM: ICD-10-CM

## 2022-11-22 RX ORDER — LEVOTHYROXINE SODIUM 0.07 MG/1
75 TABLET ORAL DAILY
Qty: 90 TABLET | Refills: 0 | Status: SHIPPED | OUTPATIENT
Start: 2022-11-22

## 2022-11-25 DIAGNOSIS — R13.10 DYSPHAGIA: ICD-10-CM

## 2022-11-25 RX ORDER — PANTOPRAZOLE SODIUM 40 MG/1
40 TABLET, DELAYED RELEASE ORAL
Qty: 90 TABLET | Refills: 0 | Status: SHIPPED | OUTPATIENT
Start: 2022-11-25

## 2023-01-18 DIAGNOSIS — E11.10 DKA (DIABETIC KETOACIDOSIS) (HCC): ICD-10-CM

## 2023-01-18 RX ORDER — PEN NEEDLE, DIABETIC 32GX 5/32"
NEEDLE, DISPOSABLE MISCELLANEOUS
Qty: 100 EACH | Refills: 0 | Status: SHIPPED | OUTPATIENT
Start: 2023-01-18

## 2023-01-27 ENCOUNTER — OFFICE VISIT (OUTPATIENT)
Dept: ENDOCRINOLOGY | Facility: HOSPITAL | Age: 43
End: 2023-01-27

## 2023-01-27 VITALS
DIASTOLIC BLOOD PRESSURE: 70 MMHG | WEIGHT: 154 LBS | HEART RATE: 106 BPM | BODY MASS INDEX: 22.05 KG/M2 | SYSTOLIC BLOOD PRESSURE: 118 MMHG | HEIGHT: 70 IN

## 2023-01-27 DIAGNOSIS — E13.40: ICD-10-CM

## 2023-01-27 DIAGNOSIS — E78.2 MIXED HYPERLIPIDEMIA: ICD-10-CM

## 2023-01-27 DIAGNOSIS — E03.9 ACQUIRED HYPOTHYROIDISM: ICD-10-CM

## 2023-01-27 DIAGNOSIS — E13.9 LADA (LATENT AUTOIMMUNE DIABETES IN ADULTS), MANAGED AS TYPE 1 (HCC): Primary | ICD-10-CM

## 2023-01-27 DIAGNOSIS — Z91.199 MEDICALLY NONCOMPLIANT: ICD-10-CM

## 2023-01-27 RX ORDER — FLASH GLUCOSE SENSOR
1 KIT MISCELLANEOUS
Qty: 6 EACH | Refills: 1 | Status: SHIPPED | OUTPATIENT
Start: 2023-01-27

## 2023-01-27 RX ORDER — FLASH GLUCOSE SCANNING READER
1 EACH MISCELLANEOUS DAILY
Qty: 1 EACH | Refills: 0 | Status: SHIPPED | OUTPATIENT
Start: 2023-01-27

## 2023-01-27 RX ORDER — GABAPENTIN 100 MG/1
100 CAPSULE ORAL
Qty: 100 CAPSULE | Refills: 1 | Status: SHIPPED | OUTPATIENT
Start: 2023-01-27

## 2023-01-27 RX ORDER — ATORVASTATIN CALCIUM 20 MG/1
20 TABLET, FILM COATED ORAL DAILY
Qty: 90 TABLET | Refills: 1 | Status: SHIPPED | OUTPATIENT
Start: 2023-01-27

## 2023-01-27 RX ORDER — INSULIN ASPART 100 [IU]/ML
12 INJECTION, SOLUTION INTRAVENOUS; SUBCUTANEOUS
Qty: 15 ML | Refills: 4 | Status: SHIPPED | OUTPATIENT
Start: 2023-01-27

## 2023-01-27 NOTE — PATIENT INSTRUCTIONS
- Lantus 40u daily in morning  - Increase novolog to 12u daily with meals with additional scale  Use the same scale for snack coverage   - -150= 1u, 150-200= 2u, 200-250= 3u, 250-300= 4u, >300 5u

## 2023-02-13 DIAGNOSIS — E11.10 DKA (DIABETIC KETOACIDOSIS) (HCC): ICD-10-CM

## 2023-02-13 DIAGNOSIS — E10.65 TYPE 1 DIABETES MELLITUS WITH HYPERGLYCEMIA (HCC): ICD-10-CM

## 2023-02-13 RX ORDER — INSULIN GLARGINE 100 [IU]/ML
40 INJECTION, SOLUTION SUBCUTANEOUS
Qty: 15 ML | Refills: 4 | Status: SHIPPED | OUTPATIENT
Start: 2023-02-13

## 2023-02-13 RX ORDER — PEN NEEDLE, DIABETIC 32GX 5/32"
NEEDLE, DISPOSABLE MISCELLANEOUS
Qty: 400 EACH | Refills: 3 | Status: SHIPPED | OUTPATIENT
Start: 2023-02-13

## 2023-02-22 DIAGNOSIS — E10.65 TYPE 1 DIABETES MELLITUS WITH HYPERGLYCEMIA (HCC): Primary | ICD-10-CM

## 2023-02-22 RX ORDER — BLOOD SUGAR DIAGNOSTIC
STRIP MISCELLANEOUS
Qty: 300 STRIP | Refills: 3 | Status: SHIPPED | OUTPATIENT
Start: 2023-02-22

## 2023-03-08 ENCOUNTER — OFFICE VISIT (OUTPATIENT)
Dept: ENDOCRINOLOGY | Facility: HOSPITAL | Age: 43
End: 2023-03-08

## 2023-03-08 VITALS
SYSTOLIC BLOOD PRESSURE: 130 MMHG | BODY MASS INDEX: 22.42 KG/M2 | WEIGHT: 156.6 LBS | HEART RATE: 97 BPM | HEIGHT: 70 IN | DIASTOLIC BLOOD PRESSURE: 90 MMHG

## 2023-03-08 DIAGNOSIS — E13.9 LADA (LATENT AUTOIMMUNE DIABETES IN ADULTS), MANAGED AS TYPE 1 (HCC): ICD-10-CM

## 2023-03-08 DIAGNOSIS — E03.9 ACQUIRED HYPOTHYROIDISM: ICD-10-CM

## 2023-03-08 DIAGNOSIS — E10.65 TYPE 1 DIABETES MELLITUS WITH HYPERGLYCEMIA (HCC): Primary | ICD-10-CM

## 2023-03-08 LAB
ALBUMIN/CREAT UR: 38 MG/G CREAT (ref 0–29)
CREAT UR-MCNC: 361.3 MG/DL
EST. AVERAGE GLUCOSE BLD GHB EST-MCNC: 275 MG/DL
HBA1C MFR BLD: 11.2 % (ref 4.8–5.6)
MICROALBUMIN UR-MCNC: 135.9 UG/ML
TSH SERPL DL<=0.005 MIU/L-ACNC: 9.2 UIU/ML (ref 0.45–4.5)

## 2023-03-08 RX ORDER — LEVOTHYROXINE SODIUM 0.1 MG/1
100 TABLET ORAL DAILY
Qty: 100 TABLET | Refills: 1 | Status: SHIPPED | OUTPATIENT
Start: 2023-03-08

## 2023-03-08 RX ORDER — INSULIN ASPART 100 [IU]/ML
16 INJECTION, SOLUTION INTRAVENOUS; SUBCUTANEOUS
Qty: 15 ML | Refills: 4 | Status: SHIPPED | OUTPATIENT
Start: 2023-03-08

## 2023-03-08 RX ORDER — BLOOD SUGAR DIAGNOSTIC
STRIP MISCELLANEOUS
Qty: 200 EACH | Refills: 1 | Status: SHIPPED | OUTPATIENT
Start: 2023-03-08

## 2023-03-08 RX ORDER — INSULIN GLARGINE 100 [IU]/ML
45 INJECTION, SOLUTION SUBCUTANEOUS
Qty: 15 ML | Refills: 4 | Status: SHIPPED | OUTPATIENT
Start: 2023-03-08

## 2023-03-08 NOTE — PROGRESS NOTES
Established Patient Progress Note       Chief Complaint   Patient presents with   • Diabetes Mellitus   • Hypothyroidism        History of Present Illness:     Dee Villagomez is a 36 y o  male with a history of Previously Dx T1DM (At age 39 while incarcerated) with moderate neuropathy, unknown retinopathy, poorly controlled hypothyroidism, prior history of heroine abuse, current smoker who was seen initially on 10/22 after a recent hospitalization for DKA in [facility] on 09/22 after patient stopped taking all his insulin/thyroid medication  History of poorly controlled diabetes for years with HbA1C in 15-19% range reportedly in shelter d/t dietary non compliance and medication non compliance  Initial visit 10/22- elevated BG in 300-400 range despite adherence d/t poor dietary choices  Also screened for other causes for insulin resistance- leptin normal, did not appear cushinoid and therefore not screened  Adjust his basal/bolus regime  HbA1C 12 6% then  Patient also poor thyroid control with TSH upto 24 and Free t4 0 4 d/t not taking levothyroxine- resumed this with repeat labs ordered  Last visit:- 01/23, Phill Harding continuous to miss several of his appointment and rescheduling  Patient reported poor compliance to lantus since he forgets to take it in morning and therefore had high fasting BG in 400-500 range  Discussed importance of compliance- changed regime to morning to improve compliance and also further increase bolus insulin d/t PPH with poor dietary choices  Patient was also prescribed CGM to obtain more BG data  Also recommended to f/u with CDE for diabetic diet discussion to improve glycemic control  Labs ordered last visit, HbA1C, urine microalbumin, TSH- and Free T4  Patient has again missed 3 of his past visit since last f/u  Discussed that he needs to keep his visits to be seen by provider and follow through with his diabetic care       Recent labs show TSH 9 20, Free T4 normal, Abnormal urine microalbumin- 36, HbA1C 11 2%  Rach Zepeda reports issues with follow up d/t transportation limitation  He does endorse compliance with lantus now after switching to AM schedule  Does report having some symptomatic lows after doing this but when checked BG was in 200 range  Would immediately urge to correcting this  Discussed not to do this  Does stay compliant with novolog with meals but takes it right before he eats  Reports feeling slightly better now but still has days of feeling tired and otherwise ok  Denies any polyuria, polydipsia  Does report having neuropathy and feels this is getting worse  Blood Sugar/Glucometer/Pump/CGM review:   [facility] Staff   Device used     Indication   Type 1 Diabetes    More than 72 hours of data was reviewed  Report to be scanned to chart  Date Range: Feb 20-March 5 2023    Analysis of data:   Average Glucose: 308  GMI 10 7%  GV 31 3%  Time in Target Range:- 14%  Time Above Range: 11%/74%   Time Below Range: 1%  Active GCM wear 59% only    Interpretation of data:   BG elevated throughout the day with huge variability  Poor GCM wear time  Current regime:- Lantus 40u at night, Novolog 12u daily with ISS 1:50 for goal 150  Only using ISF for correction between meals  Injects in abdomen  Medications tried/failed in past:- None  Hypoglycemic episodes:- symptomatic lows without true hypoglycemia reported  Diet- fast food pizza, microwave meals  Eats cookies/pretzel at night after dinner  Activity- Sedentary life for the most part, currently at home and looking for work  Weight- Stable     last eye exam- Saw eye doctor last year 2021 when incarcerated, reminded needs annual visit- overdue  last foot exam- 10/22  History of hypertension- None  History of hyperlipidemia- Yes, on lipitor 20mg daily   Last Urine microalbumin- 03/23, abnormal at 36  Last lipid- 11/22,   Started on lipitor 20mg 01/23    Hypothyroidism- Diagnosed few years ago when incarcerated  Labile control in the past d/t ?lower than weight based dose or non compliance  TSH 09/22 elevated at 24 with Free T4 low, Compliant with 75mcg levothyroxine for 6 weeks, repeat TSH 11/22 improved to 13 8 with normal FreeT4 1 04- no change to dose made  Most recent TSH 03/23 was 9 20 with levothyroxine 75mcg daily  Energy, has good days and has bad days  Denies any constipation, hair loss, dry skin issues  Social Hx:- Smokes, does not use alcohol  No drug use currently  Has a 13yr old daughter   Family Hx:- Daughter and sister has diabetes  Patient Active Problem List   Diagnosis   • Type 1 diabetes mellitus with hyperglycemia (HCC)   • Heroin use   • Hyponatremia   • Transaminitis   • Nicotine dependence   • Hand laceration   • Hypothyroidism   • Head injury   • Methamphetamine abuse (HCC)   • Hypokalemia   • Medically noncompliant   • Metabolic acidosis   • BENJI (acute kidney injury) ([facility] [address] )      Past Medical History:   Diagnosis Date   • Diabetes mellitus ([facility] [address] )    • Disease of thyroid gland       History reviewed  No pertinent surgical history  Family History   Problem Relation Age of Onset   • Hypertension Mother    • Stroke Father    • Diabetes Sister    • Diabetes Daughter      Social History     Tobacco Use   • Smoking status: Every Day     Packs/day: 1 00     Years: 20 00     Pack years: 20 00     Types: Cigarettes   • Smokeless tobacco: Never   Substance Use Topics   • Alcohol use: Not Currently     Comment: ocassionally     No Known Allergies    Current Outpatient Medications:   •  acetone, urine, test (Ketostix) strip, Use 1 strip as needed for high blood sugar, Disp: 25 each, Rfl: 0  •  Alcohol Swabs 70 % PADS, May substitute brand based on insurance coverage  Check glucose ACHS  , Disp: 200 each, Rfl: 0  •  atorvastatin (LIPITOR) 20 mg tablet, Take 1 tablet (20 mg total) by mouth daily, Disp: 90 tablet, Rfl: 1  •  Blood Glucose Monitoring Suppl (OneTouch Verio Reflect) w/Device KIT, May substitute brand based on insurance coverage  Check glucose ACHS  , Disp: 1 kit, Rfl: 0  •  Continuous Blood Gluc  (FreeStyle Jese 2 [place]) ELPIDIO, Use 1 each in the morning, Disp: 1 each, Rfl: 0  •  Continuous Blood Gluc Sensor (FreeStyle Jese 2 Sensor) MISC, Use 1 each every 14 (fourteen) days, Disp: 9 each, Rfl: 1  •  gabapentin (Neurontin) 100 mg capsule, Take 1 capsule (100 mg total) by mouth daily at bedtime, Disp: 100 capsule, Rfl: 1  •  glucose blood (OneTouch Verio) test strip, Use as instructed, Disp: 200 each, Rfl: 1  •  insulin aspart (NovoLOG FlexPen) 100 UNIT/ML injection pen, Inject 16 Units under the skin 3 (three) times a day with meals, Disp: 15 mL, Rfl: 4  •  Insulin Glargine Solostar (Basaglar KwikPen) 100 UNIT/ML SOPN, Inject 0 45 mL (45 Units total) under the skin daily at bedtime, Disp: 15 mL, Rfl: 4  •  Insulin Pen Needle (BD Pen Needle Mariela 2nd Gen) 32G X 4 MM MISC, For use with insulin pen  Pharmacy may dispense brand covered by insurance , Disp: 100 each, Rfl: 0  •  levothyroxine (Euthyrox) 100 mcg tablet, Take 1 tablet (100 mcg total) by mouth daily, Disp: 100 tablet, Rfl: 1  •  OneTouch Delica Lancets 69S MISC, May substitute brand based on insurance coverage  Check glucose ACHS  , Disp: 200 each, Rfl: 0  •  Insulin Pen Needle (BD Pen Needle Mariela 2nd Gen) 32G X 4 MM MISC, Use 4 pen needles daily (Patient not taking: Reported on 3/8/2023), Disp: 400 each, Rfl: 3    Review of Systems   Constitutional: Negative for diaphoresis, fatigue and unexpected weight change  HENT: Negative for trouble swallowing and voice change  Eyes: Negative for photophobia and visual disturbance  Respiratory: Negative for cough, chest tightness and shortness of breath  Cardiovascular: Negative for chest pain and palpitations  Gastrointestinal: Negative for constipation and diarrhea  Endocrine: Negative for cold intolerance, heat intolerance, polydipsia, polyphagia and polyuria     Musculoskeletal: Negative for arthralgias and myalgias  Skin: Negative  Neurological: Positive for numbness  Negative for syncope, weakness and light-headedness  Physical Exam:  Body mass index is 22 47 kg/m²  /90   Pulse 97   Ht 5' 10" (1 778 m)   Wt 71 kg (156 lb 9 6 oz)   BMI 22 47 kg/m²    Wt Readings from Last 3 Encounters:   03/08/23 71 kg (156 lb 9 6 oz)   01/27/23 69 9 kg (154 lb)   10/17/22 73 9 kg (163 lb)       Physical Exam  Constitutional:       Appearance: Normal appearance  He is normal weight  Cardiovascular:      Rate and Rhythm: Regular rhythm  Tachycardia present  Pulses:           Dorsalis pedis pulses are 1+ on the right side and 1+ on the left side  Heart sounds: Normal heart sounds  Pulmonary:      Effort: Pulmonary effort is normal    Abdominal:      General: Abdomen is flat  Bowel sounds are normal       Palpations: Abdomen is soft  Comments: No lipohypetrophy or lipoatrophy palpated   Musculoskeletal:      Cervical back: Normal range of motion and neck supple  Feet:      Right foot:      Skin integrity: No ulcer, skin breakdown, erythema, warmth, callus or dry skin  Left foot:      Skin integrity: No ulcer, skin breakdown, erythema, warmth, callus or dry skin  Skin:     General: Skin is warm and dry  Capillary Refill: Capillary refill takes less than 2 seconds  Neurological:      General: No focal deficit present  Mental Status: He is alert and oriented to person, place, and time     Psychiatric:         Mood and Affect: Mood normal          Behavior: Behavior normal        Labs:     Labs since last visit:-    Latest Reference Range & Units 05/14/20 14:20 09/07/22 20:04 03/07/23 13:48   Hemoglobin A1C 4 8 - 5 6 % >14 0 (H) 12 6 (H) 11 2 (H)   (H): Data is abnormally high     Latest Reference Range & Units 03/07/23 13:48   EXT Creatinine Urine Not Estab  mg/dL 361 3   MICROALBUMIN/CREATININE RATIO 0 - 29 mg/g creat 38 (H)   MICROALBUM ,U,RANDOM Not Estab  ug/mL 135 9   (H): Data is abnormally high     Latest Reference Range & Units 05/14/20 14:20 09/07/22 20:04 11/10/22 08:58 03/07/23 13:48   TSH, POC 0 450 - 4 500 uIU/mL   13 800 (H) 9 200 (H)   TSH 3RD GENERATON 0 450 - 4 500 uIU/mL 18 700 (H) 24 528 (H)     Free T4 0 76 - 1 46 ng/dL  0 49 (L)     (H): Data is abnormally high  (L): Data is abnormally low   Latest Reference Range & Units 11/10/22 08:58   T4,Free (Direct) 0 82 - 1 77 ng/dL 1 02        Latest Reference Range & Units Most Recent   Sodium 135 - 147 mmol/L 136  9/12/22 06:23   Potassium 3 5 - 5 3 mmol/L 3 8  9/12/22 06:23   Chloride 96 - 108 mmol/L 101  9/12/22 06:23   CO2 21 - 32 mmol/L 27  9/12/22 06:23   Anion Gap 4 - 13 mmol/L 8  9/12/22 06:23   BUN 5 - 25 mg/dL 9  9/12/22 06:23   Creatinine 0 60 - 1 30 mg/dL 0 80  9/12/22 06:23   Glucose, Random 70 - 99 mg/dL 431 (H)  11/10/22 08:58   Calcium 8 3 - 10 1 mg/dL 8 5  9/12/22 06:23   CORRECTED CALCIUM 8 3 - 10 1 mg/dL 10 0  9/12/22 06:23   AST 5 - 45 U/L 66 (H)  9/12/22 06:23   ALT 12 - 78 U/L 46  9/12/22 06:23   Alkaline Phosphatase 46 - 116 U/L 137 (H)  9/12/22 06:23   Total Protein 6 4 - 8 4 g/dL 6 1 (L)  9/12/22 06:23   Albumin 3 5 - 5 0 g/dL 2 1 (L)  9/12/22 06:23   TOTAL BILIRUBIN 0 20 - 1 00 mg/dL 0 20  9/12/22 06:23   eGFR ml/min/1 73sq m 109  9/12/22 06:23   Phosphorus 2 7 - 4 5 mg/dL 3 0  9/10/22 05:49   Magnesium 1 6 - 2 6 mg/dL 2 6  9/10/22 05:49   Lipase 73 - 393 u/L 116  9/7/22 20:04   (H): Data is abnormally high  (L): Data is abnormally low     Latest Reference Range & Units 11/10/22 09:10   Cholesterol 100 - 199 mg/dL 223 (H)   Triglycerides 0 - 149 mg/dL 148   HDL >39 mg/dL 70   LDL Calculated 0 - 99 mg/dL 127 (H)   VLDL Cholesterol Alejandro 5 - 40 mg/dL 26   (H): Data is abnormally high   Latest Reference Range & Units 11/10/22 08:58   LEPTIN, SERUM ng/mL 1 4   25-Hydroxy, Vitamin D 30 0 - 100 0 ng/mL 30 1       Impression & Plan:    Problem List Items Addressed This Visit Endocrine    Type 1 diabetes mellitus with hyperglycemia (HCC) - Primary    Relevant Medications    glucose blood (OneTouch Verio) test strip    Insulin Glargine Solostar (Basaglar KwikPen) 100 UNIT/ML SOPN    insulin aspart (NovoLOG FlexPen) 100 UNIT/ML injection pen    Continuous Blood Gluc Sensor (FreeStyle Jese 2 Sensor) MISC    Other Relevant Orders    Hemoglobin A1C    Lipid panel    Microalbumin / creatinine urine ratio    Hypothyroidism    Relevant Medications    levothyroxine (Euthyrox) 100 mcg tablet    Other Relevant Orders    T4, free    TSH, 3rd generation   Other Visit Diagnoses     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), managed as type 1 (HCC)        Relevant Medications    Insulin Glargine Solostar (Basaglar KwikPen) 100 UNIT/ML SOPN    insulin aspart (NovoLOG FlexPen) 100 UNIT/ML injection pen    Continuous Blood Gluc Sensor (FreeStyle Jese 2 Sensor) MISC          Orders Placed This Encounter   Procedures   • T4, free     Standing Status:   Future     Number of Occurrences:   1     Standing Expiration Date:   3/8/2024   • TSH, 3rd generation     This is a patient instruction: This test is non-fasting  Please drink two glasses of water morning of bloodwork  Standing Status:   Future     Number of Occurrences:   1     Standing Expiration Date:   3/8/2024   • Hemoglobin A1C     Standing Status:   Future     Number of Occurrences:   1     Standing Expiration Date:   3/8/2024   • Lipid panel     This is a patient instruction: This test requires patient fasting for 10-12 hours or longer  Drinking of black coffee or black tea is acceptable  Standing Status:   Future     Number of Occurrences:   1     Standing Expiration Date:   3/8/2024   • Microalbumin / creatinine urine ratio     Standing Status:   Future     Number of Occurrences:   1     Standing Expiration Date:   3/8/2024     There are no Patient Instructions on file for this visit       Patient is a 42yM with history of NAPOLEON managed as T1DM diagnosed in his late 28's with 2 episodes of DKA thus far with severe hyperglycemia, complications of diabetic neuropathy without known nephropathy/retinopathy or macrovascular complications, hypothyroidism poorly controlled as well d/t compliance issues presents today for his diabetes/thyroid management  Several missed visit, discussed that he needs to follow up with us to help control his diabetes better which is still poorly controlled d/t poor lifestyle choices and ?compliance issues  Dx:- low C-peptide but not suppressed with when  with positive SENIA along with his age of DM dx makes NAPOLEON managed as T1DM more likely than T1DM in itself  1) NAPOLEON managed as T1DM:-   - Patient is more compliant with his basal/bolus currently  Discussed still having persistant hyperglycemia d/t very poor dietary choices which are very high in carbs  - reviewed to NOT correct himself for symptomatic lows unless BG <70, to allow body to desensatize given chronically elevated BG  He will try not to  - Increase lantus to 45u and Novolog 16u with meals  Discussed to use ISF 1:50 for goal 150 with meals as well  - Unable to count carbs since he doesn't try too  - HbA1C slightly better now from 12 6% to 11 2%, which is still very high and goal should be <7%  - Ideal candidate for CDE visit but poor follow up- ordered twice and still didn't follow up    Screening-  - Reminded routine ophthalmology visit needed  - Uptodate on Lipid- repeat 11/23  - Uptodate on urine microalbumin- mildly abnormal, repeat in 3 months ordered  Discussed this complication and importance to improve glycemic control    - Started on Gabapentin 100mg for neuropathy, improved but still neuropathy bad  Discussed needs to work on improving diabetes to help avoid this from getting worse   C/w current dose of gabapentin     2) Hyperlipidemia:-  on labs done in 11/22, start on 20mg lipitor now, repeat lipid in 3 months     3)  Hypothyroidism:- Clinically reports tiredness which could be multifactorial d/t hyperglycemia as well  TSH improved from 24 to 13 8 to 9 2 with normal Free T4 with compliance of levothyroxine 75mcg  Given that TSH still elevated though and currently on lower than weight based dosing, will increase levothyroxine to 100mcg daily for now and repeat TSH in 8 weeks    RTC in 6 weeks    Discussed importance of routine follow up, treatment compliance and medication management       Alexandra Bailey MD

## 2023-04-07 DIAGNOSIS — E13.9 LADA (LATENT AUTOIMMUNE DIABETES IN ADULTS), MANAGED AS TYPE 1 (HCC): ICD-10-CM

## 2023-04-07 DIAGNOSIS — E11.10 DKA (DIABETIC KETOACIDOSIS) (HCC): ICD-10-CM

## 2023-04-07 RX ORDER — PEN NEEDLE, DIABETIC 32GX 5/32"
NEEDLE, DISPOSABLE MISCELLANEOUS
Qty: 100 EACH | Refills: 0 | Status: SHIPPED | OUTPATIENT
Start: 2023-04-07

## 2023-04-24 ENCOUNTER — OFFICE VISIT (OUTPATIENT)
Dept: ENDOCRINOLOGY | Facility: HOSPITAL | Age: 43
End: 2023-04-24

## 2023-04-24 VITALS
SYSTOLIC BLOOD PRESSURE: 110 MMHG | WEIGHT: 154 LBS | DIASTOLIC BLOOD PRESSURE: 70 MMHG | BODY MASS INDEX: 22.05 KG/M2 | HEIGHT: 70 IN

## 2023-04-24 DIAGNOSIS — E10.65 TYPE 1 DIABETES MELLITUS WITH HYPERGLYCEMIA (HCC): Primary | ICD-10-CM

## 2023-04-24 DIAGNOSIS — E13.9 LADA (LATENT AUTOIMMUNE DIABETES IN ADULTS), MANAGED AS TYPE 1 (HCC): ICD-10-CM

## 2023-04-24 RX ORDER — INSULIN ASPART 100 [IU]/ML
14 INJECTION, SOLUTION INTRAVENOUS; SUBCUTANEOUS
Qty: 15 ML | Refills: 4 | Status: SHIPPED | OUTPATIENT
Start: 2023-04-24

## 2023-04-24 RX ORDER — INSULIN DEGLUDEC INJECTION 100 U/ML
55 INJECTION, SOLUTION SUBCUTANEOUS DAILY
Qty: 15 ML | Refills: 1 | Status: SHIPPED | OUTPATIENT
Start: 2023-04-24 | End: 2023-05-01

## 2023-04-24 RX ORDER — GABAPENTIN 100 MG/1
100 CAPSULE ORAL 3 TIMES DAILY
Qty: 300 CAPSULE | Refills: 1 | Status: SHIPPED | OUTPATIENT
Start: 2023-04-24 | End: 2023-04-24 | Stop reason: SDUPTHER

## 2023-04-24 RX ORDER — GABAPENTIN 100 MG/1
100 CAPSULE ORAL 3 TIMES DAILY
Qty: 300 CAPSULE | Refills: 1 | Status: SHIPPED | OUTPATIENT
Start: 2023-04-24

## 2023-04-24 RX ORDER — INSULIN ASPART 100 [IU]/ML
22 INJECTION, SOLUTION INTRAVENOUS; SUBCUTANEOUS
Qty: 15 ML | Refills: 4 | Status: SHIPPED | OUTPATIENT
Start: 2023-04-24 | End: 2023-04-24

## 2023-04-24 NOTE — PROGRESS NOTES
Established Patient Progress Note       Chief Complaint   Patient presents with   • Diabetes Type 1        History of Present Illness:     Candie Dodge is a 36 y o  male with a history of Previously Dx T1DM (At age 39 while incarcerated) with moderate neuropathy, unknown retinopathy, Mild microalbuminuria w o CKD, poorly controlled hypothyroidism, prior history of heroine abuse, current smoker who was seen initially on 10/22 after a recent hospitalization for DKA in [facility] on 09/22 after patient stopped taking all his insulin/thyroid medication  History of poorly controlled diabetes for years with HbA1C in 15-19% range reportedly in jail d/t dietary non compliance and medication non compliance  Since initial visit, we have been titrating up his insulin doses while enforcing compliance with diet  He is now compliant with insulin and thyroid regime but still sometimes faces issues with diet  Last visit:- 03/23- Increased both his basal/bolus again  Amilcar Newton reports he has been trying to eat salad's at times but lately has been under a lot of stress with daughter also t1 acting up- stealing and grandfather current care taker not wanting to keep her any longer  Amilcar Newton lives in half way house as well therefore unable to keep daughter with her  All of this is causing issues/stress and so forth  Reports did NOT increase his humalog as recommended since notices gets low BG at times down to 50's  Feels shaky and sweaty when BG in 100's as well  Blood Sugar/Glucometer/Pump/CGM review:   Candie Dodge   Device used     Indication   Type 1 Diabetes    More than 72 hours of data was reviewed  Report to be scanned to chart  Date Range: April 11-24 2023  CGM wear only 56%    Analysis of data:   Average Glucose: 287  GMI 10 2%  GV 38 1%  Time in Target Range:- 20% (14%)  Time Above Range: 14% 64% (11%/74%)   Time Below Range: 2% (1%)    Interpretation of data:   BG elevated throughout the day with huge variability  Poor GCM wear time  BG raises overnight and does improve with meals  Few episodes of low BG with meals  Current regime:- Lantus 45u at night, Novolog 12u daily with ISS 1:50 for goal 150 (barely uses the scale)  Injects in abdomen  Medications tried/failed in past:- None  Hypoglycemic episodes:- symptomatic lows with few hypoglycemia reported  Diet- fast food pizza, microwave meals  Eats cookies/pretzel at night after dinner  Does eat salad once in a while- does know how to carb count- used to do this with daughter before  Activity- Sedentary life for the most part, currently at home and looking for work  Hard to find work d/t background  Weight- Stable     last eye exam- Saw eye doctor last year 2021 when incarcerated, reminded needs annual visit- overdue  last foot exam- 10/22  History of hypertension- None  History of hyperlipidemia- Yes, on lipitor 20mg daily   Last Urine microalbumin- 03/23, abnormal at 36  Last lipid- 11/22,   Started on lipitor 20mg 01/23    Hypothyroidism- Diagnosed few years ago when incarcerated  Labile control in the past d/t ?lower than weight based dose or non compliance  Last TSH 03/23 while on levothyroxine 75mcg- compliant with it  Therefore increased dose to 100mcg since  Reports weight is stable, does feel tired all the time- baseline, no issues with constipation, brittle nails,skin     Social Hx:- Smokes, does not use alcohol  No drug use currently  Has a 13yr old daughter   Family Hx:- Daughter and sister has diabetes   Daughter has T1DM and on pump    Patient Active Problem List   Diagnosis   • Type 1 diabetes mellitus with hyperglycemia ([facility] )   • Heroin use   • Hyponatremia   • Transaminitis   • Nicotine dependence   • Hand laceration   • Hypothyroidism   • Head injury   • [facility] )   • Hypokalemia   • Medically noncompliant   • Metabolic acidosis   • BENJI (acute kidney injury) [facility])      Past Medical History:   Diagnosis Date   • Diabetes mellitus [facility])    • Disease of thyroid gland       History reviewed  No pertinent surgical history  Family History   Problem Relation Age of Onset   • Hypertension Mother    • Stroke Father    • Diabetes Sister    • Diabetes Daughter      Social History     Tobacco Use   • Smoking status: Every Day     Packs/day: 1 00     Years: 20 00     Pack years: 20 00     Types: Cigarettes   • Smokeless tobacco: Never   Substance Use Topics   • Alcohol use: Not Currently     Comment: ocassionally     No Known Allergies    Current Outpatient Medications:   •  acetone, urine, test (Ketostix) strip, Use 1 strip as needed for high blood sugar, Disp: 25 each, Rfl: 0  •  Alcohol Swabs 70 % PADS, May substitute brand based on insurance coverage  Check glucose ACHS  , Disp: 200 each, Rfl: 0  •  atorvastatin (LIPITOR) 20 mg tablet, Take 1 tablet (20 mg total) by mouth daily, Disp: 90 tablet, Rfl: 1  •  Blood Glucose Monitoring Suppl (OneTouch Verio Reflect) w/Device KIT, May substitute brand based on insurance coverage  Check glucose ACHS  , Disp: 1 kit, Rfl: 0  •  Continuous Blood Gluc  (FreeStyle Jese 2 [place]) [facility], Use 1 each in the morning, Disp: 1 each, Rfl: 0  •  Continuous Blood Gluc Sensor (FreeStyle Jese 2 Sensor) MISC, Use 1 each every 14 (fourteen) days, Disp: 9 each, Rfl: 1  •  gabapentin (Neurontin) 100 mg capsule, Take 1 capsule (100 mg total) by mouth 3 (three) times a day, Disp: 300 capsule, Rfl: 1  •  glucose blood (OneTouch Verio) test strip, Use as instructed, Disp: 200 each, Rfl: 1  •  insulin aspart (NovoLOG FlexPen) 100 UNIT/ML injection pen, Inject 14 Units under the skin 3 (three) times a day with meals, Disp: 15 mL, Rfl: 4  •  insulin degludec Elenora Comas FlexTouch) 100 units/mL injection pen, Inject 55 Units under the skin daily, Disp: 15 mL, Rfl: 1  •  Insulin Pen Needle (BD Pen Needle Mariela 2nd Gen) 32G X 4 MM MISC, Use four a day, Disp: 100 each, Rfl: 0  •  levothyroxine (Euthyrox) 100 mcg tablet, Take 1 "tablet (100 mcg total) by mouth daily, Disp: 100 tablet, Rfl: 1  •  OneTouch Delica Lancets 41K MISC, May substitute brand based on insurance coverage  Check glucose ACHS  , Disp: 200 each, Rfl: 0  •  Insulin Pen Needle (BD Pen Needle Mariela 2nd Gen) 32G X 4 MM MISC, Use 4 pen needles daily (Patient not taking: Reported on 3/8/2023), Disp: 400 each, Rfl: 3    Review of Systems   Constitutional: Negative for diaphoresis, fatigue and unexpected weight change  HENT: Negative for trouble swallowing and voice change  Eyes: Negative for photophobia and visual disturbance  Respiratory: Negative for cough, chest tightness and shortness of breath  Cardiovascular: Negative for chest pain and palpitations  Gastrointestinal: Negative for constipation and diarrhea  Endocrine: Negative for cold intolerance, heat intolerance, polydipsia, polyphagia and polyuria  Musculoskeletal: Negative for arthralgias and myalgias  Skin: Negative  Neurological: Positive for numbness  Negative for syncope, weakness and light-headedness  Physical Exam:  Body mass index is 22 1 kg/m²  /70   Ht 5' 10\" (1 778 m)   Wt 69 9 kg (154 lb)   BMI 22 10 kg/m²    Wt Readings from Last 3 Encounters:   04/24/23 69 9 kg (154 lb)   03/08/23 71 kg (156 lb 9 6 oz)   01/27/23 69 9 kg (154 lb)       Physical Exam  Constitutional:       Appearance: Normal appearance  He is normal weight  Cardiovascular:      Rate and Rhythm: Normal rate and regular rhythm  Pulses:           Dorsalis pedis pulses are 1+ on the right side and 1+ on the left side  Heart sounds: Normal heart sounds  Pulmonary:      Effort: Pulmonary effort is normal    Abdominal:      General: Abdomen is flat  Bowel sounds are normal       Palpations: Abdomen is soft  Comments: No lipohypetrophy or lipoatrophy palpated   Musculoskeletal:      Cervical back: Normal range of motion and neck supple     Feet:      Right foot:      Skin integrity: No ulcer, " skin breakdown, erythema, warmth, callus or dry skin  Left foot:      Skin integrity: No ulcer, skin breakdown, erythema, warmth, callus or dry skin  Skin:     General: Skin is warm and dry  Capillary Refill: Capillary refill takes less than 2 seconds  Neurological:      General: No focal deficit present  Mental Status: He is alert and oriented to person, place, and time     Psychiatric:         Mood and Affect: Mood normal          Behavior: Behavior normal        Labs:     Labs since last visit:-    Latest Reference Range & Units 05/14/20 14:20 09/07/22 20:04 03/07/23 13:48   Hemoglobin A1C 4 8 - 5 6 % >14 0 (H) 12 6 (H) 11 2 (H)   (H): Data is abnormally high     Latest Reference Range & Units 03/07/23 13:48   EXT Creatinine Urine Not Estab  mg/dL 361 3   MICROALBUMIN/CREATININE RATIO 0 - 29 mg/g creat 38 (H)   MICROALBUM ,U,RANDOM Not Estab  ug/mL 135 9   (H): Data is abnormally high     Latest Reference Range & Units 05/14/20 14:20 09/07/22 20:04 11/10/22 08:58 03/07/23 13:48   TSH, POC 0 450 - 4 500 uIU/mL   13 800 (H) 9 200 (H)   TSH 3RD GENERATON 0 450 - 4 500 uIU/mL 18 700 (H) 24 528 (H)     Free T4 0 76 - 1 46 ng/dL  0 49 (L)     (H): Data is abnormally high  (L): Data is abnormally low   Latest Reference Range & Units 11/10/22 08:58   T4,Free (Direct) 0 82 - 1 77 ng/dL 1 02        Latest Reference Range & Units Most Recent   Sodium 135 - 147 mmol/L 136  9/12/22 06:23   Potassium 3 5 - 5 3 mmol/L 3 8  9/12/22 06:23   Chloride 96 - 108 mmol/L 101  9/12/22 06:23   CO2 21 - 32 mmol/L 27  9/12/22 06:23   Anion Gap 4 - 13 mmol/L 8  9/12/22 06:23   BUN 5 - 25 mg/dL 9  9/12/22 06:23   Creatinine 0 60 - 1 30 mg/dL 0 80  9/12/22 06:23   Glucose, Random 70 - 99 mg/dL 431 (H)  11/10/22 08:58   Calcium 8 3 - 10 1 mg/dL 8 5  9/12/22 06:23   CORRECTED CALCIUM 8 3 - 10 1 mg/dL 10 0  9/12/22 06:23   AST 5 - 45 U/L 66 (H)  9/12/22 06:23   ALT 12 - 78 U/L 46  9/12/22 06:23   Alkaline Phosphatase 46 - 116 U/L 137 (H)  9/12/22 06:23   Total Protein 6 4 - 8 4 g/dL 6 1 (L)  9/12/22 06:23   Albumin 3 5 - 5 0 g/dL 2 1 (L)  9/12/22 06:23   TOTAL BILIRUBIN 0 20 - 1 00 mg/dL 0 20  9/12/22 06:23   eGFR ml/min/1 73sq m 109  9/12/22 06:23   Phosphorus 2 7 - 4 5 mg/dL 3 0  9/10/22 05:49   Magnesium 1 6 - 2 6 mg/dL 2 6  9/10/22 05:49   Lipase 73 - 393 u/L 116  9/7/22 20:04   (H): Data is abnormally high  (L): Data is abnormally low     Latest Reference Range & Units 11/10/22 09:10   Cholesterol 100 - 199 mg/dL 223 (H)   Triglycerides 0 - 149 mg/dL 148   HDL >39 mg/dL 70   LDL Calculated 0 - 99 mg/dL 127 (H)   VLDL Cholesterol Alejandro 5 - 40 mg/dL 26   (H): Data is abnormally high   Latest Reference Range & Units 11/10/22 08:58   LEPTIN, SERUM ng/mL 1 4   25-Hydroxy, Vitamin D 30 0 - 100 0 ng/mL 30 1       Impression & Plan:    Problem List Items Addressed This Visit        Endocrine    Type 1 diabetes mellitus with hyperglycemia (HCC) - Primary    Relevant Medications    insulin degludec Noralee Sprung FlexTouch) 100 units/mL injection pen    insulin aspart (NovoLOG FlexPen) 100 UNIT/ML injection pen    gabapentin (Neurontin) 100 mg capsule    Other Relevant Orders    Hemoglobin A1C    Microalbumin / creatinine urine ratio    Lipid panel    T4, free    TSH, 3rd generation   Other Visit Diagnoses     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), managed as type 1 (HCC)        Relevant Medications    insulin degludec Noralee Sprung FlexTouch) 100 units/mL injection pen    insulin aspart (NovoLOG FlexPen) 100 UNIT/ML injection pen    gabapentin (Neurontin) 100 mg capsule          Orders Placed This Encounter   Procedures   • Hemoglobin A1C     Standing Status:   Future     Number of Occurrences:   1     Standing Expiration Date:   4/24/2024   • Microalbumin / creatinine urine ratio     Standing Status:   Future     Number of Occurrences:   1     Standing Expiration Date:   4/24/2024   • Lipid panel     This is a patient instruction:  This test requires patient fasting for 10-12 hours or longer  Drinking of black coffee or black tea is acceptable  Standing Status:   Future     Number of Occurrences:   1     Standing Expiration Date:   4/24/2024   • T4, free     Standing Status:   Future     Number of Occurrences:   1     Standing Expiration Date:   4/24/2024   • TSH, 3rd generation     This is a patient instruction: This test is non-fasting  Please drink two glasses of water morning of bloodwork  Standing Status:   Future     Number of Occurrences:   1     Standing Expiration Date:   4/24/2024     There are no Patient Instructions on file for this visit  Patient is a 42yM with history of NAPOLEON managed as T1DM diagnosed in his late 28's with 2 episodes of DKA thus far with severe hyperglycemia- last episode 09/22 d/t compliance issues, complications of diabetic neuropathy, microalbuminuria w/o CKD no known retinopathy- not uptodate on screening, No macrovascular complications, hypothyroidism poorly controlled as well d/t compliance issues presents today for his diabetes/thyroid management  Low C-peptide but not suppressed with when  with positive SENIA along with his age of DM dx makes NAPOLEON managed as T1DM more likely than T1DM in itself  1) NAPOLEON managed as T1DM:-   - Patient is more compliant with his basal/bolus currently  Still having High BG d/t diet, and also possible stress  Does report feeling symptomatic with BG in 100's which I discussed if NOT low BG but does have few BG in 50's as well- post prandial    - Issues with low BG and hyperglycemia d/t high BG variability with meals and also overnight d/t snacking  No carb restricted diet     - Re-educated on carb counting and recommended calorie doris luanne to help restrict carb in means     For now will adjust insulin as below  - Switch from lantus to tresiba since ultra long acting- 55u daily    - Slightly increase novolog to 14u with each meal and work on consistent carbs with meal  - Start carb counting and bring log to next visit  - Ideal candidate for CDE visit but poor follow up- ordered twice and still didn't follow up- d/t lack of transportation  Several No shows with my clinic    Screening-  - Reminded routine ophthalmology visit needed  - Uptodate on Lipid- repeat for next visit  - Uptodate on urine microalbumin- mildly abnormal, repeat next visit   - c/w Gabapentin 100mg TID for neuropathy     2) Hyperlipidemia:-  on labs done in 11/22, start on 20mg lipitor now, repeat next visit    3)  Hypothyroidism:- Clinically reports tiredness which could be multifactorial d/t hyperglycemia as well  Last TSH 03/23 still elevated increased dose to levothyroxine to 100mcg daily  Repeat TSH in 1 month to follow and adjust dose to aim for euthyroid    RTC in 6 weeks  I have spent a total time of 40 minutes on 04/24/23 in caring for this patient including Diagnostic results, Prognosis, Risks and benefits of tx options, Instructions for management and Patient and family education  Discussed importance of routine follow up, treatment compliance and medication management       Ange Haywood MD

## 2023-05-01 DIAGNOSIS — E10.65 TYPE 1 DIABETES MELLITUS WITH HYPERGLYCEMIA (HCC): Primary | ICD-10-CM

## 2023-05-01 RX ORDER — INSULIN GLARGINE 300 U/ML
55 INJECTION, SOLUTION SUBCUTANEOUS DAILY
Qty: 6 ML | Refills: 1 | Status: SHIPPED | OUTPATIENT
Start: 2023-05-01

## 2023-05-02 ENCOUNTER — DOCUMENTATION (OUTPATIENT)
Dept: ENDOCRINOLOGY | Facility: HOSPITAL | Age: 43
End: 2023-05-02

## 2023-06-13 NOTE — PROGRESS NOTES
Established Patient Progress Note       Chief Complaint   Patient presents with   • Diabetes Type 1   • Hypothyroidism        History of Present Illness:     Ivone Hernández is a 36 y o  male with a history of Previously Dx T1DM (At age 39 while incarcerated) with moderate neuropathy, unknown retinopathy, poorly controlled hypothyroidism, prior history of heroine abuse, current smoker who was seen initially on 10/22 after a recent hospitalization for DKA in [facility] on 09/22 after patient stopped taking all his insulin/thyroid medication  History of poorly controlled diabetes for years with HbA1C in 15-19% range reportedly in [facility] d/t dietary non compliance and medication non compliance  Last visit:- 10/22, Patient was on Lantus 25u and Novolog 5u with meals and reported adherence to treatment despite which BG elevated in 300-400 range with HbA1C 12 6%  Therefore we increase both his basal/bolus regime and recommended CDE visit to discuss diet changes and also recommended close f/u  Also screened for current insulin production and repeat Ab workup for completion purpose given late Dx of T1DM  Also screened for ?lipodystrophy given severe insulin resistance and insulin requirements  With regards to his hypothyroidism, was not taking levothyroxine 75mcg for months with TSH 24 and free T4 0 4 on 09/22, reported compliance with Medication for the last month therefore recommended repeat labs prior to Tx change  Unfortunately patient has missed 2 of his past appointment since d/t scheduling issues with his work  Also did not follow up with CDE visit cancelled x3  Did reach out to me 11/22 and had persistant hyperglycemia therefore increased his Basal/bolus regime again  Labs since last visit showed 11/22- Leptin normal, C peptide 0 9 with , SENIA 19  TSH 13 8 with Free T4 1 02 (TSH prior 24 09/22)   Lipid-       Blood Sugar/Glucometer/Pump/CGM review:   Checks BG 2x daily   Fasting 588, 436  2hrs after bk 247, 177  2hrs after lunch 398, 325, 446  Bedtime 353, 366, 165     Current regime:- Lantus 40u, Novolog 10u daily     Injects in abdomen  Compliant with Lantus only 50% of the time, says he forget to take his lantus at night since is too tired by the end of the day to take medication  Does report compliance with Novolog shot 90% of the time    Medications tried/failed in past:- None  Hypoglycemic episodes: - couple of times reported having issues with feeling shaky and sweating- checked BG says possible in 50's  Says possible didn't eat right after he took this novolog shot  Diet- fast food pizza, microwave meals  Eats cookies/pretzel at night after dinner  Activity- Sedentary life for the most part, currently at home and looking for work  Weight- Stable     last eye exam- Saw eye doctor last year 2021 when incarcerated, reminded needs annual visit- overdue  last foot exam- 10/22  History of hypertension- None  History of hyperlipidemia- Reportedly yes, not currently on statin  Tells me he was on statin when incarcerated   Last Urine microalbumin- None on file   Last lipid- 11/22,   Not on statin    Hypothyroidism- Diagnosed few years ago when incarcerated  Labile control in the past d/t ?lower than weight based dose or non compliance  TSH 09/22 elevated at 24 with Free T4 low, Compliant with 75mcg levothyroxine for 6 weeks, repeat TSH 11/22 improved to 13 8 with normal FreeT4 1 04- no change to dose made  Patient continous to feel tired througout the day, Denies any constipation, hair loss, dry skin issues  Social Hx:- Smokes, does not use alcohol  No drug use currently  Has a 13yr old daughter   Family Hx:- Daughter and sister has diabetes       Patient Active Problem List   Diagnosis   • Type 1 diabetes mellitus with hyperglycemia (HCC)   • Heroin use   • Hyponatremia   • Transaminitis   • Nicotine dependence   • Hand laceration   • Hypothyroidism   • Head injury   • Methamphetamine abuse [facility])   • Hypokalemia   • Medically noncompliant   • Metabolic acidosis   • BENJI (acute kidney injury) ([facility] [address] )      Past Medical History:   Diagnosis Date   • Diabetes mellitus ([facility] [address] )    • Disease of thyroid gland       History reviewed  No pertinent surgical history  Family History   Problem Relation Age of Onset   • Hypertension Mother    • Diabetes Sister    • Diabetes Daughter      Social History     Tobacco Use   • Smoking status: Every Day     Packs/day: 1 00     Years: 20 00     Pack years: 20 00     Types: Cigarettes   • Smokeless tobacco: Never   Substance Use Topics   • Alcohol use: Not Currently     Comment: ocassionally     No Known Allergies    Current Outpatient Medications:   •  acetone, urine, test (Ketostix) strip, Use 1 strip as needed for high blood sugar, Disp: 25 each, Rfl: 0  •  Alcohol Swabs 70 % PADS, May substitute brand based on insurance coverage  Check glucose ACHS  , Disp: 200 each, Rfl: 0  •  atorvastatin (LIPITOR) 20 mg tablet, Take 1 tablet (20 mg total) by mouth daily, Disp: 90 tablet, Rfl: 1  •  Blood Glucose Monitoring Suppl (OneTouch Verio Reflect) w/Device KIT, May substitute brand based on insurance coverage  Check glucose ACHS  , Disp: 1 kit, Rfl: 0  •  gabapentin (Neurontin) 100 mg capsule, Take 1 capsule (100 mg total) by mouth daily at bedtime, Disp: 100 capsule, Rfl: 1  •  glucose blood (OneTouch Verio) test strip, May substitute brand based on insurance coverage  Check glucose ACHS  , Disp: 200 each, Rfl: 0  •  insulin aspart (NovoLOG FlexPen) 100 UNIT/ML injection pen, Inject 12 Units under the skin 3 (three) times a day with meals, Disp: 15 mL, Rfl: 4  •  Insulin Glargine Solostar (Basaglar KwikPen) 100 UNIT/ML SOPN, Inject 0 4 mL (40 Units total) under the skin daily at bedtime, Disp: 15 mL, Rfl: 4  •  Insulin Pen Needle (BD Pen Needle Mariela 2nd Gen) 32G X 4 MM MISC, Use 4 pen needles daily, Disp: 400 each, Rfl: 3  •  Insulin Pen Needle (BD Pen Needle Mariela 2nd Gen) 32G X 4 MM MISC, For use with insulin pen  Pharmacy may dispense brand covered by insurance , Disp: 100 each, Rfl: 0  •  levothyroxine 75 mcg tablet, Take 1 tablet (75 mcg total) by mouth daily, Disp: 90 tablet, Rfl: 0  •  OneTouch Delica Lancets 19V MISC, May substitute brand based on insurance coverage  Check glucose ACHS  , Disp: 200 each, Rfl: 0  •  pantoprazole (PROTONIX) 40 mg tablet, Take 1 tablet (40 mg total) by mouth daily in the early morning (Patient not taking: Reported on 1/27/2023), Disp: 90 tablet, Rfl: 0    Review of Systems   Constitutional: Negative for diaphoresis, fatigue and unexpected weight change  HENT: Negative for trouble swallowing and voice change  Eyes: Negative for photophobia and visual disturbance  Respiratory: Negative for cough, chest tightness and shortness of breath  Cardiovascular: Negative for chest pain and palpitations  Gastrointestinal: Negative for constipation and diarrhea  Endocrine: Negative for cold intolerance, heat intolerance, polydipsia, polyphagia and polyuria  Musculoskeletal: Negative for arthralgias and myalgias  Skin: Negative  Neurological: Positive for numbness  Negative for syncope, weakness and light-headedness  Physical Exam:  Body mass index is 22 1 kg/m²  /70   Pulse (!) 106   Ht 5' 10" (1 778 m)   Wt 69 9 kg (154 lb)   BMI 22 10 kg/m²    Wt Readings from Last 3 Encounters:   01/27/23 69 9 kg (154 lb)   10/17/22 73 9 kg (163 lb)   10/10/22 75 8 kg (167 lb)       Physical Exam  Constitutional:       Appearance: Normal appearance  He is normal weight  Cardiovascular:      Rate and Rhythm: Regular rhythm  Tachycardia present  Pulses:           Dorsalis pedis pulses are 1+ on the right side and 1+ on the left side  Heart sounds: Normal heart sounds  Pulmonary:      Effort: Pulmonary effort is normal    Abdominal:      General: Abdomen is flat  Bowel sounds are normal       Palpations: Abdomen is soft        Comments: No lipohypetrophy or lipoatrophy palpated   Musculoskeletal:      Cervical back: Normal range of motion and neck supple  Feet:      Right foot:      Skin integrity: No ulcer, skin breakdown, erythema, warmth, callus or dry skin  Left foot:      Skin integrity: No ulcer, skin breakdown, erythema, warmth, callus or dry skin  Skin:     General: Skin is warm and dry  Capillary Refill: Capillary refill takes less than 2 seconds  Neurological:      General: No focal deficit present  Mental Status: He is alert and oriented to person, place, and time     Psychiatric:         Mood and Affect: Mood normal          Behavior: Behavior normal        Labs:      Latest Reference Range & Units Most Recent   Sodium 135 - 147 mmol/L 136  9/12/22 06:23   Potassium 3 5 - 5 3 mmol/L 3 8  9/12/22 06:23   Chloride 96 - 108 mmol/L 101  9/12/22 06:23   CO2 21 - 32 mmol/L 27  9/12/22 06:23   Anion Gap 4 - 13 mmol/L 8  9/12/22 06:23   BUN 5 - 25 mg/dL 9  9/12/22 06:23   Creatinine 0 60 - 1 30 mg/dL 0 80  9/12/22 06:23   Glucose, Random 70 - 99 mg/dL 431 (H)  11/10/22 08:58   Calcium 8 3 - 10 1 mg/dL 8 5  9/12/22 06:23   CORRECTED CALCIUM 8 3 - 10 1 mg/dL 10 0  9/12/22 06:23   AST 5 - 45 U/L 66 (H)  9/12/22 06:23   ALT 12 - 78 U/L 46  9/12/22 06:23   Alkaline Phosphatase 46 - 116 U/L 137 (H)  9/12/22 06:23   Total Protein 6 4 - 8 4 g/dL 6 1 (L)  9/12/22 06:23   Albumin 3 5 - 5 0 g/dL 2 1 (L)  9/12/22 06:23   TOTAL BILIRUBIN 0 20 - 1 00 mg/dL 0 20  9/12/22 06:23   eGFR ml/min/1 73sq m 109  9/12/22 06:23   Phosphorus 2 7 - 4 5 mg/dL 3 0  9/10/22 05:49   Magnesium 1 6 - 2 6 mg/dL 2 6  9/10/22 05:49   Lipase 73 - 393 u/L 116  9/7/22 20:04   (H): Data is abnormally high  (L): Data is abnormally low     Latest Reference Range & Units 11/10/22 09:10   Cholesterol 100 - 199 mg/dL 223 (H)   Triglycerides 0 - 149 mg/dL 148   HDL >39 mg/dL 70   LDL Calculated 0 - 99 mg/dL 127 (H)   VLDL Cholesterol Alejandro 5 - 40 mg/dL 26   (H): Data is abnormally high   Latest Reference Range & Units 11/10/22 08:58   LEPTIN, SERUM ng/mL 1 4   25-Hydroxy, Vitamin D 30 0 - 100 0 ng/mL 30 1      Latest Reference Range & Units 05/14/20 14:20 09/07/22 20:04   Hemoglobin A1C Normal 3 8-5 6%; PreDiabetic 5 7-6 4%;  Diabetic >=6 5%; Glycemic control for adults with diabetes <7 0% % >14 0 (H) 12 6 (H)   (H): Data is abnormally high     Latest Reference Range & Units 04/19/20 09:05 05/07/20 21:28 05/14/20 14:20 09/07/22 20:04 11/10/22 08:58   TSH, POC 0 450 - 4 500 uIU/mL     13 800 (H)   TSH 3RD GENERATON 0 450 - 4 500 uIU/mL 17 906 (H) 12 024 (H) 18 700 (H) 24 528 (H)    Free T4 0 76 - 1 46 ng/dL  0 72 (L)  0 49 (L)    (H): Data is abnormally high  (L): Data is abnormally low     Latest Reference Range & Units 11/10/22 08:58   T4,Free (Direct) 0 82 - 1 77 ng/dL 1 02   Impression & Plan:    Problem List Items Addressed This Visit        Endocrine    Type 1 diabetes mellitus with hyperglycemia (HCC) - Primary    Relevant Medications    gabapentin (Neurontin) 100 mg capsule    atorvastatin (LIPITOR) 20 mg tablet    insulin aspart (NovoLOG FlexPen) 100 UNIT/ML injection pen    Hypothyroidism       Other    Medically noncompliant   Other Visit Diagnoses     Mixed hyperlipidemia        Relevant Medications    atorvastatin (LIPITOR) 20 mg tablet    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 (HCC)        Relevant Medications    insulin aspart (NovoLOG FlexPen) 100 UNIT/ML injection pen          Orders Placed This Encounter   Procedures   • TSH, 3rd generation with Free T4 reflex     Standing Status:   Future     Number of Occurrences:   1     Standing Expiration Date:   1/27/2024   • T4, free Lab Collect     Standing Status:   Future     Number of Occurrences:   1     Standing Expiration Date:   1/27/2024   • Microalbumin / creatinine urine ratio Lab Collect     Standing Status:   Future     Number of Occurrences:   1     Standing Expiration Date: 1/27/2024   • HEMOGLOBIN A1C W/ EAG ESTIMATION Lab Collect     Standing Status:   Future     Number of Occurrences:   1     Standing Expiration Date:   1/27/2024   • Ambulatory referral to Diabetic Education     Standing Status:   Future     Standing Expiration Date:   1/27/2024     Referral Priority:   Routine     Referral Type:   Consult - AMB     Referral Reason:   Specialty Services Required     Requested Specialty:   Diabetes Services     Number of Visits Requested:   1     Expiration Date:   1/27/2024       Patient Instructions   - Lantus 40u daily in morning  - Increase novolog to 12u daily with meals with additional scale  Use the same scale for snack coverage   - -150= 1u, 150-200= 2u, 200-250= 3u, 250-300= 4u, >300 5u  Patient is a 36yM with history of DM diagnosed in his late 29's with 2 episodes of DKA thus far with severe hyperglycemia, complications of diabetic neuropathy without known nephropathy/retinopathy or macrovascular complications, hypothyroidism poorly controlled as well d/t compliance issues presents today for his diabetes/thyroid management  Given patients low C-peptide but not suppressed with when  with positive SENIA along with his age of DM dx makes [place] managed as T1DM more likely than T1DM in itself  Would have expected C peptide <0 1 if had T1DM by now  C peptie 0 9 with + and SENIA 19  Neg IA-2 and ZincT8  1) [place] managed as T1DM:- Reviewed patients BG logs and continous to have hyperglycemia-significant throughout the day  - Reviewed compliance and patient only takes his Lantus 50% of the time, also affecting fasting hyperglycemia- midnight snack which are high in carbs and patient does not cover for these with insulin  - Also reviewed diet is very high in carbohydrate and not diabetic friendly   Unfortunately patient not willing to work much on this d/t affordability and also does not want to cook  - Unable to count carbs  - Patient high risk for poorly controlled Diabetes d/t compliance and also lack of education regarding diabetes  Reviewed complications of micro and macrovascular complications  He is already having neuropathy now  Patient does not want to lose his foot! Plan  - Given poor compliance with Lantus dose rather than need for dose adjustment, recommend no change to lantus dose  C/w 40u lantus, terry from PM dosing to AM dosing to improve compliance and not forgetting it   - Does have [address] d/t poor dietary choices, 20% increase in novolog dose to 12u daily with meals and use additional scale with ISF 1:50 for goal 100  - Suggest novolog scale to be used with each meal and also check BG before bedtime snack and use the scale for that as well   - CDE visit- reordered and enforced compliance   - Will send CGM [employer] for more BG data   - Repeat HbA1C now, last A1C 09/22 at 12 6%   - Reminded routine ophthalmology visit needed  - Uptodate on Lipid- repeat 11/23  - Will order Urine microalbumin  - Start low dose gabapentin for neuropathy- reviewed side effects of sedative effect- recommend only taking it at night 100mg daily      2) Hyperlipidemia:-  on labs done in 11/22, start on 20mg lipitor now, repeat lipid in 6 months     3)  Hypothyroidism:- Clinically reports tiredness which could be multifactorial d/t hyperglycemia as well  TSH improved from 24 to 13 8 with normal Free T4 with compliance of levothyroxine 75mcg  Although on lower than weight based requirement (~112mcg) given the positive trend, will recheck TSH and Free T4 now  If still remains high will then increased to weight based dosing  If trend positive or improved continue with same dose  Repeat TSH in 6 weeks after     RTC in 4 weeks    Discussed importance of routine follow up, treatment compliance and medication management     I have spent 45 minutes with Patient  today in which greater than 50% of this time was spent in counseling/coordination of care regarding Prognosis, Risks and benefits of tx options, Patient and family education, Importance of tx compliance and Impressions  Discussed with the patient and all questioned fully answered  He will call me if any problems arise      Counseled patient on diagnostic results, prognosis, risk and benefit of treatment options, instruction for management, importance of treatment compliance, Risk  factor reduction and impressions      Rosemary Howard MD 13-Jun-2023 07:00

## 2023-06-23 DIAGNOSIS — E11.10 DKA (DIABETIC KETOACIDOSIS) (HCC): ICD-10-CM

## 2023-06-23 DIAGNOSIS — E13.9 LADA (LATENT AUTOIMMUNE DIABETES IN ADULTS), MANAGED AS TYPE 1 (HCC): ICD-10-CM

## 2023-06-23 RX ORDER — PEN NEEDLE, DIABETIC 32GX 5/32"
NEEDLE, DISPOSABLE MISCELLANEOUS
Qty: 100 EACH | Refills: 3 | Status: SHIPPED | OUTPATIENT
Start: 2023-06-23

## 2023-08-11 DIAGNOSIS — E13.9 LADA (LATENT AUTOIMMUNE DIABETES IN ADULTS), MANAGED AS TYPE 1 (HCC): ICD-10-CM

## 2023-08-11 RX ORDER — ATORVASTATIN CALCIUM 20 MG/1
20 TABLET, FILM COATED ORAL DAILY
Qty: 90 TABLET | Refills: 1 | OUTPATIENT
Start: 2023-08-11

## 2023-08-15 DIAGNOSIS — E10.65 TYPE 1 DIABETES MELLITUS WITH HYPERGLYCEMIA (HCC): ICD-10-CM

## 2023-08-15 RX ORDER — INSULIN GLARGINE 300 U/ML
55 INJECTION, SOLUTION SUBCUTANEOUS DAILY
Qty: 6 ML | Refills: 0 | Status: SHIPPED | OUTPATIENT
Start: 2023-08-15

## 2023-09-26 DIAGNOSIS — E11.10 DKA (DIABETIC KETOACIDOSIS) (HCC): ICD-10-CM

## 2023-09-26 RX ORDER — PEN NEEDLE, DIABETIC 32GX 5/32"
NEEDLE, DISPOSABLE MISCELLANEOUS
Qty: 100 EACH | Refills: 3 | Status: SHIPPED | OUTPATIENT
Start: 2023-09-26

## 2023-12-15 ENCOUNTER — TELEPHONE (OUTPATIENT)
Dept: ENDOCRINOLOGY | Facility: HOSPITAL | Age: 43
End: 2023-12-15

## 2023-12-15 NOTE — TELEPHONE ENCOUNTER
Patient called and said he needed a refill on all his prescriptions, however he was sent a last chance warning letter for all of his frequent No Shows. I scheduled him for Monday and if he does not keep this appointment, then he will be formally discharged from the practice. His medications are not to be refilled until after he shows for his appointment on Monday. Patient is aware he must keep this appointment. Thank you.

## 2024-01-17 ENCOUNTER — OFFICE VISIT (OUTPATIENT)
Dept: ENDOCRINOLOGY | Facility: HOSPITAL | Age: 44
End: 2024-01-17
Payer: COMMERCIAL

## 2024-01-17 VITALS
BODY MASS INDEX: 20.07 KG/M2 | HEIGHT: 70 IN | SYSTOLIC BLOOD PRESSURE: 122 MMHG | DIASTOLIC BLOOD PRESSURE: 70 MMHG | WEIGHT: 140.2 LBS

## 2024-01-17 DIAGNOSIS — R73.09 HEMOGLOBIN A1C GREATER THAN 9%, INDICATING POOR DIABETIC CONTROL: ICD-10-CM

## 2024-01-17 DIAGNOSIS — E13.9 LADA (LATENT AUTOIMMUNE DIABETES IN ADULTS), MANAGED AS TYPE 1 (HCC): ICD-10-CM

## 2024-01-17 DIAGNOSIS — E10.65 TYPE 1 DIABETES MELLITUS WITH HYPERGLYCEMIA (HCC): Primary | ICD-10-CM

## 2024-01-17 DIAGNOSIS — E10.65 TYPE 1 DIABETES MELLITUS WITH HYPERGLYCEMIA (HCC): ICD-10-CM

## 2024-01-17 DIAGNOSIS — E11.10 DKA (DIABETIC KETOACIDOSIS) (HCC): ICD-10-CM

## 2024-01-17 DIAGNOSIS — E06.3 HYPOTHYROIDISM DUE TO HASHIMOTO'S THYROIDITIS: ICD-10-CM

## 2024-01-17 DIAGNOSIS — E03.9 ACQUIRED HYPOTHYROIDISM: ICD-10-CM

## 2024-01-17 DIAGNOSIS — E03.8 HYPOTHYROIDISM DUE TO HASHIMOTO'S THYROIDITIS: ICD-10-CM

## 2024-01-17 PROCEDURE — 99214 OFFICE O/P EST MOD 30 MIN: CPT | Performed by: STUDENT IN AN ORGANIZED HEALTH CARE EDUCATION/TRAINING PROGRAM

## 2024-01-17 RX ORDER — LANCETS 33 GAUGE
EACH MISCELLANEOUS
Qty: 200 EACH | Refills: 0 | Status: SHIPPED | OUTPATIENT
Start: 2024-01-17

## 2024-01-17 RX ORDER — ATORVASTATIN CALCIUM 20 MG/1
20 TABLET, FILM COATED ORAL DAILY
Qty: 90 TABLET | Refills: 1 | Status: SHIPPED | OUTPATIENT
Start: 2024-01-17

## 2024-01-17 RX ORDER — URINE ACETONE TEST STRIPS
1 STRIP MISCELLANEOUS AS NEEDED
Qty: 25 EACH | Refills: 0 | Status: SHIPPED | OUTPATIENT
Start: 2024-01-17

## 2024-01-17 RX ORDER — INSULIN ASPART 100 [IU]/ML
14 INJECTION, SOLUTION INTRAVENOUS; SUBCUTANEOUS
Qty: 45 ML | Refills: 1 | Status: SHIPPED | OUTPATIENT
Start: 2024-01-17 | End: 2024-01-17 | Stop reason: SDUPTHER

## 2024-01-17 RX ORDER — BLOOD SUGAR DIAGNOSTIC
STRIP MISCELLANEOUS
Qty: 200 EACH | Refills: 1 | Status: SHIPPED | OUTPATIENT
Start: 2024-01-17

## 2024-01-17 RX ORDER — INSULIN ASPART 100 [IU]/ML
14 INJECTION, SOLUTION INTRAVENOUS; SUBCUTANEOUS
Qty: 45 ML | Refills: 1 | Status: SHIPPED | OUTPATIENT
Start: 2024-01-17

## 2024-01-17 RX ORDER — BLOOD SUGAR DIAGNOSTIC
STRIP MISCELLANEOUS
Qty: 200 EACH | Refills: 1 | Status: SHIPPED | OUTPATIENT
Start: 2024-01-17 | End: 2024-01-17

## 2024-01-17 RX ORDER — LEVOTHYROXINE SODIUM 0.1 MG/1
100 TABLET ORAL DAILY
Qty: 100 TABLET | Refills: 1 | Status: SHIPPED | OUTPATIENT
Start: 2024-01-17

## 2024-01-17 RX ORDER — PEN NEEDLE, DIABETIC 32GX 5/32"
NEEDLE, DISPOSABLE MISCELLANEOUS
Qty: 400 EACH | Refills: 3 | Status: SHIPPED | OUTPATIENT
Start: 2024-01-17

## 2024-01-17 RX ORDER — INSULIN GLARGINE 300 U/ML
55 INJECTION, SOLUTION SUBCUTANEOUS DAILY
Qty: 12 ML | Refills: 1 | Status: SHIPPED | OUTPATIENT
Start: 2024-01-17

## 2024-01-17 NOTE — PROGRESS NOTES
Established Patient Progress Note       Chief Complaint   Patient presents with    Diabetes Type 1    Hypothyroidism      History of Present Illness:     Cash Han is a 43 y.o. male with a history of Previously Dx T1DM (At age 36 while incarcerated) with moderate neuropathy, unknown retinopathy, Mild microalbuminuria w.o CKD, poorly controlled hypothyroidism, prior history of heroine abuse, current smoker who was seen initially on 10/22 after a recent hospitalization for DKA in [facility] on 09/22 after patient stopped taking all his insulin/thyroid medication. History of poorly controlled diabetes for years with HbA1C in 15-19% range reportedly in senior care d/t dietary non compliance and medication non compliance. Since initial visit, we have been titrating up his insulin doses while enforcing compliance with diet.     Last visit:- 04/23, no recent labs either. Patient has had several no shows and issues getting rides/making it to visit. Also unable to find job and this is causing depression and sometimes non compliance. Out of tuojeo for past 3 days, has been rationing novolog for few weeks and out of levothyroxine for over a month. Reports is having BV but denies n/V    Blood Sugar/Glucometer/Pump/CGM review: No BG data to review  Current regime:- hasn't been taking his Tuojeo since out of it and only taking 10u novolog with meals.   Medications tried/failed in past:- None  Hypoglycemic episodes:- unsure  Diet- poor diet with high carb intake, pizza, frozen meals   Activity- Sedentary life for the most part, currently at home and looking for work. Hard to find work d/t background  Weight- lost 14lbs since last year    last eye exam- Saw eye doctor last year 2021 when incarcerated, reminded needs annual visit- overdue  last foot exam- 10/22  History of hypertension- None  History of hyperlipidemia- Yes, on lipitor 20mg daily   Last Urine microalbumin- 03/23, abnormal at 36  Last lipid- 11/22, . Started on  lipitor 20mg 01/23  No recent labs- OVERDUE for all LABS. Got them done today reportedly but we dont have results     Hypothyroidism- Diagnosed few years ago when incarcerated. Labile control in the past d/t ?lower than weight based dose or non compliance. Last TSH 03/23 while on levothyroxine 75mcg- compliant with it. Therefore increased dose to 100mcg since. No recent labs since.     Social Hx:- Smokes, does not use alcohol. No drug use currently. Has a 13yr old daughter   Family Hx:- Daughter and sister has diabetes. Daughter has T1DM and on pump    Patient Active Problem List   Diagnosis    Type 1 diabetes mellitus with hyperglycemia (HCC)    Heroin use    Hyponatremia    Transaminitis    Nicotine dependence    Hand laceration    Hypothyroidism    Head injury    Methamphetamine abuse (HCC)    Hypokalemia    Medically noncompliant    Metabolic acidosis    BENJI (acute kidney injury) (HCC)      Past Medical History:   Diagnosis Date    Diabetes mellitus (HCC)     Disease of thyroid gland       No past surgical history on file.   Family History   Problem Relation Age of Onset    Hypertension Mother     Stroke Father     Diabetes Sister     Diabetes Daughter      Social History     Tobacco Use    Smoking status: Every Day     Current packs/day: 1.00     Average packs/day: 1 pack/day for 20.0 years (20.0 ttl pk-yrs)     Types: Cigarettes    Smokeless tobacco: Never   Substance Use Topics    Alcohol use: Not Currently     Comment: ocassionally     No Known Allergies    Current Outpatient Medications:     acetone, urine, test (Ketostix) strip, Use 1 strip as needed for high blood sugar, Disp: 25 each, Rfl: 0    Alcohol Swabs 70 % PADS, May substitute brand based on insurance coverage. Check glucose ACHS., Disp: 200 each, Rfl: 0    atorvastatin (LIPITOR) 20 mg tablet, Take 1 tablet (20 mg total) by mouth daily, Disp: 90 tablet, Rfl: 1    Blood Glucose Monitoring Suppl (OneTouch Verio Reflect) w/Device KIT, May substitute  brand based on insurance coverage. Check glucose ACHS., Disp: 1 kit, Rfl: 0    Continuous Blood Gluc  (FreeStyle Jese 2 [place]) ELPIDIO, Use 1 each in the morning, Disp: 1 each, Rfl: 0    Continuous Blood Gluc Sensor (FreeStyle Jese 2 Sensor) MISC, Use 1 each every 14 (fourteen) days, Disp: 9 each, Rfl: 2    gabapentin (Neurontin) 100 mg capsule, Take 1 capsule (100 mg total) by mouth 3 (three) times a day, Disp: 300 capsule, Rfl: 1    glucose blood (OneTouch Verio) test strip, Use as instructed, Disp: 200 each, Rfl: 1    insulin aspart (NovoLOG FlexPen) 100 UNIT/ML injection pen, Inject 14 Units under the skin 3 (three) times a day with meals, Disp: 15 mL, Rfl: 4    insulin glargine (Toujeo Max SoloStar) 300 units/mL CONCENTRATED U-300 injection pen (2-unit dial), Inject 55 Units under the skin daily, Disp: 6 mL, Rfl: 0    Insulin Pen Needle (BD Pen Needle Mariela 2nd Gen) 32G X 4 MM MISC, Use 4 pen needles daily (Patient not taking: Reported on 3/8/2023), Disp: 400 each, Rfl: 3    Insulin Pen Needle (BD Pen Needle Mariela 2nd Gen) 32G X 4 MM MISC, Use four a day, Disp: 100 each, Rfl: 3    levothyroxine (Euthyrox) 100 mcg tablet, Take 1 tablet (100 mcg total) by mouth daily, Disp: 100 tablet, Rfl: 1    OneTouch Delica Lancets 33G MISC, May substitute brand based on insurance coverage. Check glucose ACHS., Disp: 200 each, Rfl: 0    Review of Systems   Constitutional:  Negative for diaphoresis, fatigue and unexpected weight change.   HENT:  Negative for trouble swallowing and voice change.    Eyes:  Negative for photophobia and visual disturbance.   Respiratory:  Negative for cough, chest tightness and shortness of breath.    Cardiovascular:  Negative for chest pain and palpitations.   Gastrointestinal:  Negative for constipation and diarrhea.   Endocrine: Negative for cold intolerance, heat intolerance, polydipsia, polyphagia and polyuria.   Musculoskeletal:  Negative for arthralgias and myalgias.   Skin: Negative.     Neurological:  Positive for numbness and headaches. Negative for syncope, weakness and light-headedness.       Physical Exam:  There is no height or weight on file to calculate BMI.  There were no vitals taken for this visit.   Wt Readings from Last 3 Encounters:   04/24/23 69.9 kg (154 lb)   03/08/23 71 kg (156 lb 9.6 oz)   01/27/23 69.9 kg (154 lb)       Physical Exam  Constitutional:       Appearance: Normal appearance. He is normal weight.   Cardiovascular:      Rate and Rhythm: Normal rate and regular rhythm.      Pulses:           Dorsalis pedis pulses are 1+ on the right side and 1+ on the left side.      Heart sounds: Normal heart sounds.   Pulmonary:      Effort: Pulmonary effort is normal.   Abdominal:      General: Abdomen is flat. Bowel sounds are normal.      Palpations: Abdomen is soft.      Comments: No lipohypetrophy or lipoatrophy palpated   Musculoskeletal:      Cervical back: Normal range of motion and neck supple.   Feet:      Right foot:      Skin integrity: No ulcer, skin breakdown, erythema, warmth, callus or dry skin.      Left foot:      Skin integrity: No ulcer, skin breakdown, erythema, warmth, callus or dry skin.   Skin:     General: Skin is warm and dry.      Capillary Refill: Capillary refill takes less than 2 seconds.   Neurological:      General: No focal deficit present.      Mental Status: He is alert and oriented to person, place, and time.   Psychiatric:         Mood and Affect: Mood normal.         Behavior: Behavior normal.       Labs:     Labs since last visit:-    Latest Reference Range & Units 05/14/20 14:20 09/07/22 20:04 03/07/23 13:48   Hemoglobin A1C 4.8 - 5.6 % >14.0 (H) 12.6 (H) 11.2 (H)   (H): Data is abnormally high     Latest Reference Range & Units 03/07/23 13:48   EXT Creatinine Urine Not Estab. mg/dL 361.3   MICROALBUMIN/CREATININE RATIO 0 - 29 mg/g creat 38 (H)   MICROALBUM.,U,RANDOM Not Estab. ug/mL 135.9   (H): Data is abnormally high     Latest Reference  Range & Units 05/14/20 14:20 09/07/22 20:04 11/10/22 08:58 03/07/23 13:48   TSH, POC 0.450 - 4.500 uIU/mL   13.800 (H) 9.200 (H)   TSH 3RD GENERATON 0.450 - 4.500 uIU/mL 18.700 (H) 24.528 (H)     Free T4 0.76 - 1.46 ng/dL  0.49 (L)     (H): Data is abnormally high  (L): Data is abnormally low   Latest Reference Range & Units 11/10/22 08:58   T4,Free (Direct) 0.82 - 1.77 ng/dL 1.02        Latest Reference Range & Units Most Recent   Sodium 135 - 147 mmol/L 136  9/12/22 06:23   Potassium 3.5 - 5.3 mmol/L 3.8  9/12/22 06:23   Chloride 96 - 108 mmol/L 101  9/12/22 06:23   CO2 21 - 32 mmol/L 27  9/12/22 06:23   Anion Gap 4 - 13 mmol/L 8  9/12/22 06:23   BUN 5 - 25 mg/dL 9  9/12/22 06:23   Creatinine 0.60 - 1.30 mg/dL 0.80  9/12/22 06:23   Glucose, Random 70 - 99 mg/dL 431 (H)  11/10/22 08:58   Calcium 8.3 - 10.1 mg/dL 8.5  9/12/22 06:23   CORRECTED CALCIUM 8.3 - 10.1 mg/dL 10.0  9/12/22 06:23   AST 5 - 45 U/L 66 (H)  9/12/22 06:23   ALT 12 - 78 U/L 46  9/12/22 06:23   Alkaline Phosphatase 46 - 116 U/L 137 (H)  9/12/22 06:23   Total Protein 6.4 - 8.4 g/dL 6.1 (L)  9/12/22 06:23   Albumin 3.5 - 5.0 g/dL 2.1 (L)  9/12/22 06:23   TOTAL BILIRUBIN 0.20 - 1.00 mg/dL 0.20  9/12/22 06:23   eGFR ml/min/1.73sq m 109  9/12/22 06:23   Phosphorus 2.7 - 4.5 mg/dL 3.0  9/10/22 05:49   Magnesium 1.6 - 2.6 mg/dL 2.6  9/10/22 05:49   Lipase 73 - 393 u/L 116  9/7/22 20:04   (H): Data is abnormally high  (L): Data is abnormally low     Latest Reference Range & Units 11/10/22 09:10   Cholesterol 100 - 199 mg/dL 223 (H)   Triglycerides 0 - 149 mg/dL 148   HDL >39 mg/dL 70   LDL Calculated 0 - 99 mg/dL 127 (H)   VLDL Cholesterol Alejandro 5 - 40 mg/dL 26   (H): Data is abnormally high   Latest Reference Range & Units 11/10/22 08:58   LEPTIN, SERUM ng/mL 1.4   25-Hydroxy, Vitamin D 30.0 - 100.0 ng/mL 30.1       Impression & Plan:    Problem List Items Addressed This Visit          Endocrine    Hypothyroidism    Type 1 diabetes mellitus with  hyperglycemia (HCC) - Primary     No orders of the defined types were placed in this encounter.    There are no Patient Instructions on file for this visit.     Patient is a 42yM with history of NAPOLEON managed as T1DM diagnosed in his late 30's with 2 episodes of DKA thus far with severe hyperglycemia- last episode 09/22 d/t compliance issues, complications of diabetic neuropathy, microalbuminuria w/o CKD no known retinopathy- not uptodate on screening, No macrovascular complications, hypothyroidism poorly controlled as well d/t compliance issues presents today for his diabetes/thyroid management. Low C-peptide but not suppressed with when  with positive SENIA along with his age of DM dx makes NAPOLEON managed as T1DM more likely than T1DM in itself.     1) NAPOLEON managed as T1DM:-   We do not have any A1C data today review today indicating any idea about his overall control. Got labs done today, no BG logs to review today. Patient has been out of basal insulin for several months and also only taking less novolog which is concerning for high risk of DKA and hyperglycemia. Currently not symptomatic for DKA. Because of limited data unable to adjust his regime, discussed continue with current regime but will give supply only for 3 months for insulin to make sure he follows up with labs and routine follow ups for his diabetes. If having difficulty maintaining care, can follow up with PCP closer to keep his diabetic control. However to help him improve needs to maintain appointments and also BG logs/ lab work.     - C/w tresiba 55u daily.   - c/w novolog to 14u with each meal and work on consistent carbs with meal  - bring log to next visit  - Ideal candidate for CDE visit but poor follow up- ordered twice and still didn't follow up- d/t lack of transportation. Several No shows with my clinic    Screening-  - Reminded routine ophthalmology visit needed  - NOT Uptodate on Lipid  - NOT Uptodate on urine microalbumin- mildly  abnormal  - c/w Gabapentin 100mg TID for neuropathy     2) Hyperlipidemia:-  on labs done in 11/22, start on 20mg lipitor now, repeat NOW    3)  Hypothyroidism:- Clinically reports tiredness which could be multifactorial d/t hyperglycemia as well. Last TSH 03/23 still elevated increased dose to levothyroxine to 100mcg daily. Patient NOT taking this either, Repeat TSH NOW to follow and resume medications.    RTC in 3 months     High risk for complications from poor diabetic control and also hypothyroid control d/t patient compliance issues    Liz Spears MD

## 2024-01-18 DIAGNOSIS — E10.65 TYPE 1 DIABETES MELLITUS WITH HYPERGLYCEMIA (HCC): Primary | ICD-10-CM

## 2024-01-18 DIAGNOSIS — E13.9 LADA (LATENT AUTOIMMUNE DIABETES IN ADULTS), MANAGED AS TYPE 1 (HCC): ICD-10-CM

## 2024-01-18 LAB
ALBUMIN/CREAT UR: 110 MG/G CREAT (ref 0–29)
CHOLEST SERPL-MCNC: 299 MG/DL (ref 100–199)
CHOLEST/HDLC SERPL: 2.6 RATIO (ref 0–5)
CREAT UR-MCNC: 31.6 MG/DL
EST. AVERAGE GLUCOSE BLD GHB EST-MCNC: >398 MG/DL
HBA1C MFR BLD: >15.5 % (ref 4.8–5.6)
HDLC SERPL-MCNC: 114 MG/DL
LDLC SERPL CALC-MCNC: 155 MG/DL (ref 0–99)
MICROALBUMIN UR-MCNC: 34.9 UG/ML
SL AMB VLDL CHOLESTEROL CALC: 30 MG/DL (ref 5–40)
T4 FREE SERPL-MCNC: 0.47 NG/DL (ref 0.82–1.77)
TRIGL SERPL-MCNC: 175 MG/DL (ref 0–149)
TSH SERPL DL<=0.005 MIU/L-ACNC: 83.6 UIU/ML (ref 0.45–4.5)

## 2024-02-21 PROBLEM — S61.419A HAND LACERATION: Status: RESOLVED | Noted: 2020-04-19 | Resolved: 2024-02-21

## 2024-05-14 ENCOUNTER — OFFICE VISIT (OUTPATIENT)
Dept: URGENT CARE | Facility: CLINIC | Age: 44
End: 2024-05-14
Payer: COMMERCIAL

## 2024-05-14 VITALS
HEART RATE: 72 BPM | DIASTOLIC BLOOD PRESSURE: 88 MMHG | WEIGHT: 150 LBS | TEMPERATURE: 98.8 F | OXYGEN SATURATION: 100 % | BODY MASS INDEX: 21 KG/M2 | SYSTOLIC BLOOD PRESSURE: 130 MMHG | HEIGHT: 71 IN | RESPIRATION RATE: 16 BRPM

## 2024-05-14 DIAGNOSIS — S30.860A TICK BITE OF BACK, INITIAL ENCOUNTER: Primary | ICD-10-CM
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
## 2024-05-14 PROCEDURE — 99213 OFFICE O/P EST LOW 20 MIN: CPT | Performed by: PHYSICIAN ASSISTANT

## 2024-05-14 RX ORDER — DOXYCYCLINE 100 MG/1
200 TABLET ORAL ONCE
Qty: 2 TABLET | Refills: 0 | Status: SHIPPED | OUTPATIENT
Start: 2024-05-14 | End: 2024-05-14

## 2024-05-14 NOTE — PROGRESS NOTES
[facility]        NAME: Cash Han is a 43 y.o. male  : 1980    MRN: [id number]  DATE: May 14, 2024  TIME: 11:52 AM    Assessment and Plan   Tick bite of back, initial encounter [S30.860A, W57.XXXA]  1. Tick bite of back, initial encounter  doxycycline (ADOXA) 100 MG tablet            Patient Instructions       Follow up with PCP in 3-5 days.  Proceed to  ER if symptoms worsen.    If tests have been performed at [facility], our office will contact you with results if changes need to be made to the care plan discussed with you at the visit.  [facility]    Chief Complaint     Chief Complaint   Patient presents with    Tick Bite     Pt reports left sided tick bite that occurred two days ago. Pt states he removed the tick and is unsure if the head is still intact. Area is red. C/o general fatigue with onset yesterday.          History of Present Illness       Patient presents with a 2 tick bites that he pulled off 2 days ago. Unsure of the ticks. Bitten over the left upper and lower back. Yesterday felt a little achy and tired but no symptoms today.  Denies fevers, headaches, joint swelling, visual disturbances, or neck pain/stiffness.     Tick Bite  Associated symptoms include fatigue, myalgias and a rash. Pertinent negatives include no arthralgias, fever, headaches or weakness.       Review of Systems   Review of Systems   Constitutional:  Positive for fatigue. Negative for fever.   Eyes:  Negative for visual disturbance.   Musculoskeletal:  Positive for myalgias. Negative for arthralgias.   Skin:  Positive for rash.   Neurological:  Negative for weakness and headaches.         Current Medications       Current Outpatient Medications:     acetone, urine, test (Ketostix) strip, Use 1 strip as needed for high blood sugar, Disp: 25 each, Rfl: 0    Alcohol Swabs 70 % PADS, May substitute brand based on insurance coverage. Check glucose ACHS., Disp: 200 each,  Rfl: 0    atorvastatin (LIPITOR) 20 mg tablet, Take 1 tablet (20 mg total) by mouth daily, Disp: 90 tablet, Rfl: 1    Continuous Blood Gluc Sensor (FreeStyle Jese 2 Sensor) MISC, Use 1 each every 14 (fourteen) days, Disp: 9 each, Rfl: 2    doxycycline (ADOXA) 100 MG tablet, Take 2 tablets (200 mg total) by mouth once for 1 dose, Disp: 2 tablet, Rfl: 0    glucose blood (OneTouch Verio) test strip, Check blood sugars twice a day, Disp: 200 each, Rfl: 1    insulin aspart (NovoLOG FlexPen) 100 UNIT/ML injection pen, Inject 14 Units under the skin 3 (three) times a day with meals, Disp: 45 mL, Rfl: 1    insulin glargine (Toujeo Max SoloStar) 300 units/mL CONCENTRATED U-300 injection pen (2-unit dial), Inject 55 Units under the skin daily, Disp: 12 mL, Rfl: 1    Insulin Pen Needle (BD Pen Needle Mariela 2nd Gen) 32G X 4 MM MISC, Use four a day, Disp: 100 each, Rfl: 3    Insulin Pen Needle (BD Pen Needle Mariela 2nd Gen) 32G X 4 MM MISC, Use 4 pen needles daily, Disp: 400 each, Rfl: 3    levothyroxine (Euthyrox) 100 mcg tablet, Take 1 tablet (100 mcg total) by mouth daily, Disp: 100 tablet, Rfl: 1    OneTouch Delica Lancets 33G MISC, May substitute brand based on insurance coverage. Check glucose ACHS., Disp: 200 each, Rfl: 0    Blood Glucose Monitoring Suppl (OneTouch Verio Reflect) w/Device KIT, May substitute brand based on insurance coverage. Check glucose ACHS. (Patient not taking: Reported on 1/17/2024), Disp: 1 kit, Rfl: 0    Continuous Blood Gluc  (FreeStyle Jese 2 [place]) ELPIDIO, Use 1 each in the morning, Disp: 1 each, Rfl: 0    gabapentin (Neurontin) 100 mg capsule, Take 1 capsule (100 mg total) by mouth 3 (three) times a day (Patient not taking: Reported on 1/17/2024), Disp: 300 capsule, Rfl: 1    Current Allergies     Allergies as of 05/14/2024    (No Known Allergies)            The following portions of the patient's history were reviewed and updated as appropriate: allergies, current medications, past  "family history, past medical history, past social history, past surgical history and problem list.     Past Medical History:   Diagnosis Date    Diabetes mellitus (HCC)     Disease of thyroid gland        No past surgical history on file.    Family History   Problem Relation Age of Onset    Hypertension Mother     Stroke Father     Diabetes Sister     Diabetes Daughter          Medications have been verified.        Objective   /88 (BP Location: Right arm, Patient Position: Sitting)   Pulse 72   Temp 98.8 °F (37.1 °C)   Resp 16   Ht 5' 11\" (1.803 m)   Wt 68 kg (150 lb)   SpO2 100%   BMI 20.92 kg/m²   No LMP for male patient.       Physical Exam     Physical Exam  Constitutional:       Appearance: Normal appearance.   Musculoskeletal:        Back:    Neurological:      Mental Status: He is alert.   Psychiatric:         Mood and Affect: Mood normal.         Behavior: Behavior normal.                   "

## 2024-05-16 DIAGNOSIS — E10.65 TYPE 1 DIABETES MELLITUS WITH HYPERGLYCEMIA (HCC): Primary | ICD-10-CM

## 2024-05-22 RX ORDER — INSULIN GLARGINE 300 U/ML
INJECTION, SOLUTION SUBCUTANEOUS
Qty: 6 ML | Refills: 0 | Status: SHIPPED | OUTPATIENT
Start: 2024-05-22

## 2024-07-11 DIAGNOSIS — E13.9 LADA (LATENT AUTOIMMUNE DIABETES IN ADULTS), MANAGED AS TYPE 1 (HCC): ICD-10-CM

## 2024-07-11 RX ORDER — ATORVASTATIN CALCIUM 20 MG/1
20 TABLET, FILM COATED ORAL DAILY
Qty: 90 TABLET | Refills: 1 | Status: SHIPPED | OUTPATIENT
Start: 2024-07-11

## 2024-08-15 ENCOUNTER — TELEPHONE (OUTPATIENT)
Age: 44
End: 2024-08-15

## 2024-08-15 DIAGNOSIS — E13.9 LADA (LATENT AUTOIMMUNE DIABETES IN ADULTS), MANAGED AS TYPE 1 (HCC): ICD-10-CM

## 2024-08-15 NOTE — TELEPHONE ENCOUNTER
The pt called asking for a refill on his Freestyle Jese 2 sensors to be sent to the [address] in [place].

## 2024-08-16 NOTE — TELEPHONE ENCOUNTER
Lab orders faxed.   
The pt called wanting a refill and I see he has NS and cx for previous appts so I scheduled him for next week and he stated he goes to [facility] in [place] to have his labs done. If new slips could be entered and faxed to them. Phone there is [phone] and fax is [phone].  
<<-----Click here for Discharge Medication Review

## 2024-08-16 NOTE — TELEPHONE ENCOUNTER
7 months ago Liz Spears MD [facility] [place] Office Visit

## 2024-09-10 ENCOUNTER — OFFICE VISIT (OUTPATIENT)
Dept: URGENT CARE | Facility: CLINIC | Age: 44
End: 2024-09-10
Payer: COMMERCIAL

## 2024-09-10 VITALS
SYSTOLIC BLOOD PRESSURE: 136 MMHG | BODY MASS INDEX: 21 KG/M2 | TEMPERATURE: 97.8 F | WEIGHT: 150 LBS | RESPIRATION RATE: 16 BRPM | DIASTOLIC BLOOD PRESSURE: 98 MMHG | HEIGHT: 71 IN

## 2024-09-10 DIAGNOSIS — L03.116 CELLULITIS OF LEFT LOWER EXTREMITY: ICD-10-CM

## 2024-09-10 DIAGNOSIS — L02.91 ABSCESS: Primary | ICD-10-CM

## 2024-09-10 PROCEDURE — 99213 OFFICE O/P EST LOW 20 MIN: CPT | Performed by: PHYSICIAN ASSISTANT

## 2024-09-10 NOTE — PROGRESS NOTES
[facility]        NAME: Cash Han is a 44 y.o. male  : 1980    MRN: [id number]  DATE: September 10, 2024  TIME: 7:50 PM    Assessment and Plan   Abscess [L02.91]  1. Abscess  Transfer to other facility      2. Cellulitis of left lower extremity  Transfer to other facility        Area appears concerning for necrosis of the skin and possibly into underlying areas.  Patient is diabetic and the area has been rapidly worsening over the past 2 days.  Believe he would benefit from IV antibiotics and having the area debrided.  Sent to ER for further evaluation    Patient Instructions       Follow up with PCP in 3-5 days.  Proceed to  ER if symptoms worsen.    If tests have been performed at [facility], our office will contact you with results if changes need to be made to the care plan discussed with you at the visit.  You can review your full results on [facility].    Chief Complaint     Chief Complaint   Patient presents with    Leg Pain     Pt presents with left leg possible abscess with onset one week ago. Area is raised with blackening center and surrounding redness. Managing with otc medicated cream without relief.          History of Present Illness       Patient presents with left lower leg about a week ago developing an area of redness that within the last day or 2 has become black in the center and the black area has been spreading and the redness and swelling has gotten significantly worse.  Denies fevers, muscle or bodyaches.    Leg Pain         Review of Systems   Review of Systems   Constitutional:  Negative for fever.   Musculoskeletal:  Negative for arthralgias and myalgias.   Skin:  Positive for rash and wound.         Current Medications       Current Outpatient Medications:     acetone, urine, test (Ketostix) strip, Use 1 strip as needed for high blood sugar, Disp: 25 each, Rfl: 0    Alcohol Swabs 70 % PADS, May substitute brand based on insurance coverage. Check glucose  ACHS., Disp: 200 each, Rfl: 0    atorvastatin (LIPITOR) 20 mg tablet, Take 1 tablet by mouth once daily, Disp: 90 tablet, Rfl: 1    Continuous Blood Gluc  (FreeStyle Jese 2 [place]) ELPIDIO, Use 1 each in the morning, Disp: 1 each, Rfl: 0    glucose blood (OneTouch Verio) test strip, Check blood sugars twice a day, Disp: 200 each, Rfl: 1    insulin aspart (NovoLOG FlexPen) 100 UNIT/ML injection pen, Inject 14 Units under the skin 3 (three) times a day with meals, Disp: 45 mL, Rfl: 1    Insulin Pen Needle (BD Pen Needle Mariela 2nd Gen) 32G X 4 MM MISC, Use 4 pen needles daily, Disp: 400 each, Rfl: 3    levothyroxine (Euthyrox) 100 mcg tablet, Take 1 tablet (100 mcg total) by mouth daily, Disp: 100 tablet, Rfl: 1    OneTouch Delica Lancets 33G MISC, May substitute brand based on insurance coverage. Check glucose ACHS., Disp: 200 each, Rfl: 0    Toujeo SoloStar 300 units/mL CONCENTRATED U-300 injection pen (1-unit dial), INJECT 55 UNITS UNDER THE SKIN DAILY, Disp: 6 mL, Rfl: 0    Blood Glucose Monitoring Suppl (OneTouch Verio Reflect) w/Device KIT, May substitute brand based on insurance coverage. Check glucose ACHS. (Patient not taking: Reported on 1/17/2024), Disp: 1 kit, Rfl: 0    Continuous Glucose Sensor (FreeStyle Jese 2 Sensor) MISC, Use 1 each every 14 (fourteen) days, Disp: 9 each, Rfl: 1    gabapentin (Neurontin) 100 mg capsule, Take 1 capsule (100 mg total) by mouth 3 (three) times a day (Patient not taking: Reported on 1/17/2024), Disp: 300 capsule, Rfl: 1    insulin glargine (Toujeo Max SoloStar) 300 units/mL CONCENTRATED U-300 injection pen (2-unit dial), Inject 55 Units under the skin daily, Disp: 12 mL, Rfl: 1    Insulin Pen Needle (BD Pen Needle Mariela 2nd Gen) 32G X 4 MM MISC, Use four a day, Disp: 100 each, Rfl: 3    Current Allergies     Allergies as of 09/10/2024    (No Known Allergies)            The following portions of the patient's history were reviewed and updated as appropriate: allergies,  "current medications, past family history, past medical history, past social history, past surgical history and problem list.     Past Medical History:   Diagnosis Date    Diabetes mellitus (HCC)     Disease of thyroid gland        No past surgical history on file.    Family History   Problem Relation Age of Onset    Hypertension Mother     Stroke Father     Diabetes Sister     Diabetes Daughter          Medications have been verified.        Objective   /98 (BP Location: Right arm, Patient Position: Sitting)   Temp 97.8 °F (36.6 °C)   Resp 16   Ht 5' 11\" (1.803 m)   Wt 68 kg (150 lb)   BMI 20.92 kg/m²   No LMP for male patient.       Physical Exam     Physical Exam  Constitutional:       Appearance: Normal appearance.   Skin:            Comments: Approximately 3 x 2 cm area of black tissue with underlying fluctuance and severe tenderness to palpation.  Surrounding swelling and erythema approximately 1 diameter also noted   Neurological:      Mental Status: He is alert.   Psychiatric:         Mood and Affect: Mood normal.         Behavior: Behavior normal.                   "

## 2024-09-18 DIAGNOSIS — E10.65 TYPE 1 DIABETES MELLITUS WITH HYPERGLYCEMIA (HCC): ICD-10-CM

## 2024-09-18 NOTE — TELEPHONE ENCOUNTER
Reason for call:   [x] Refill   [] Prior Auth  [] Other:     Office:   [] PCP/Provider -   [x] Specialty/Provider - TABITHA wang    Medication:   Toujeo     Pharmacy: [facility]    Does the patient have enough for 3 days?   [] Yes   [x] No - Send as HP to POD

## 2024-09-23 RX ORDER — INSULIN GLARGINE 300 U/ML
55 INJECTION, SOLUTION SUBCUTANEOUS DAILY
Qty: 6 ML | Refills: 1 | OUTPATIENT
Start: 2024-09-23

## 2024-09-26 DIAGNOSIS — E10.65 TYPE 1 DIABETES MELLITUS WITH HYPERGLYCEMIA (HCC): ICD-10-CM

## 2024-09-26 RX ORDER — INSULIN GLARGINE 300 U/ML
55 INJECTION, SOLUTION SUBCUTANEOUS DAILY
Qty: 15 ML | Refills: 1 | Status: SHIPPED | OUTPATIENT
Start: 2024-09-26 | End: 2024-09-28 | Stop reason: SDUPTHER

## 2024-09-27 NOTE — TELEPHONE ENCOUNTER
Patient called in to check on his Rx being sent to pharmacy because he has not heard from anyone yet.      I did confirm that his medication was sent to his [facility] pharmacy yesterday.      Patient verbalized his understanding.

## 2024-09-28 ENCOUNTER — NURSE TRIAGE (OUTPATIENT)
Dept: OTHER | Facility: OTHER | Age: 44
End: 2024-09-28

## 2024-09-28 DIAGNOSIS — E10.65 TYPE 1 DIABETES MELLITUS WITH HYPERGLYCEMIA (HCC): ICD-10-CM

## 2024-09-28 RX ORDER — INSULIN GLARGINE 300 U/ML
55 INJECTION, SOLUTION SUBCUTANEOUS DAILY
Qty: 15 ML | Refills: 0 | Status: SHIPPED | OUTPATIENT
Start: 2024-09-28

## 2024-09-28 NOTE — TELEPHONE ENCOUNTER
"Reason for Disposition  • [1] Prescription prescribed recently is not at pharmacy AND [2] triager has access to patient's EMR AND [3] prescription is recorded in the EMR    Answer Assessment - Initial Assessment Questions  1. DRUG NAME: \"What medicine do you need to have refilled?\"      insulin glargine (Toujeo Max SoloStar) 300 units/mL CONCENTRATED U-300 injection pen (2-unit dial)    2. REFILLS REMAINING: \"How many refills are remaining?\" (Note: The label on the medicine or pill bottle will show how many refills are remaining. If there are no refills remaining, then a renewal may be needed.)      0    3. EXPIRATION DATE: \"What is the expiration date?\" (Note: The label states when the prescription will , and thus can no longer be refilled.)      N/A    4. PRESCRIBING HCP: \"Who prescribed it?\" Reason: If prescribed by specialist, call should be referred to that group.      Liz Spears MD    5. SYMPTOMS: \"Do you have any symptoms?\"      None    Protocols used: Medication Refill and Renewal Call-Adult-    "

## 2024-09-28 NOTE — TELEPHONE ENCOUNTER
"Regarding: Insulin  ----- Message from Salina CASTREJON sent at 9/28/2024 10:55 AM EDT -----  \"I was suppose get insulin sent to the pharmacy but when I got there they said they never received it\"    "

## 2024-09-30 DIAGNOSIS — E10.65 TYPE 1 DIABETES MELLITUS WITH HYPERGLYCEMIA (HCC): ICD-10-CM

## 2024-09-30 RX ORDER — INSULIN GLARGINE 300 U/ML
INJECTION, SOLUTION SUBCUTANEOUS
Qty: 15 ML | Refills: 0 | Status: SHIPPED | OUTPATIENT
Start: 2024-09-30 | End: 2024-09-30

## 2024-09-30 RX ORDER — INSULIN GLARGINE 300 U/ML
56 INJECTION, SOLUTION SUBCUTANEOUS DAILY
Qty: 15 ML | Refills: 0 | Status: SHIPPED | OUTPATIENT
Start: 2024-09-30 | End: 2024-10-09 | Stop reason: SDUPTHER

## 2024-09-30 NOTE — TELEPHONE ENCOUNTER
Patient called in stating that the prescription that was sent was for the wrong medication.     Toujeo MAX Solostar was sent but it should be just the plain TOUJEO SOLOSTAR.

## 2024-10-09 DIAGNOSIS — E10.65 TYPE 1 DIABETES MELLITUS WITH HYPERGLYCEMIA (HCC): ICD-10-CM

## 2024-10-09 RX ORDER — INSULIN GLARGINE 300 U/ML
56 INJECTION, SOLUTION SUBCUTANEOUS DAILY
Qty: 15 ML | Refills: 0 | Status: SHIPPED | OUTPATIENT
Start: 2024-10-09

## 2024-10-11 ENCOUNTER — TELEPHONE (OUTPATIENT)
Dept: ENDOCRINOLOGY | Facility: HOSPITAL | Age: 44
End: 2024-10-11

## 2024-10-24 DIAGNOSIS — E03.9 ACQUIRED HYPOTHYROIDISM: ICD-10-CM

## 2024-10-24 RX ORDER — LEVOTHYROXINE SODIUM 100 UG/1
100 TABLET ORAL DAILY
Qty: 100 TABLET | Refills: 1 | Status: SHIPPED | OUTPATIENT
Start: 2024-10-24

## 2024-10-24 NOTE — TELEPHONE ENCOUNTER
The pt called asking for a refill of his Levothyroxine as he only has 2 pills left. He would like qty 100 with refill sent to the [address] [facility]

## 2024-10-24 NOTE — TELEPHONE ENCOUNTER
9 months ago Liz Spears MD [facility] [place] Office Visit

## 2024-12-23 DIAGNOSIS — E10.65 TYPE 1 DIABETES MELLITUS WITH HYPERGLYCEMIA (HCC): ICD-10-CM

## 2024-12-24 RX ORDER — INSULIN GLARGINE 300 U/ML
INJECTION, SOLUTION SUBCUTANEOUS
Qty: 12 ML | Refills: 0 | Status: SHIPPED | OUTPATIENT
Start: 2024-12-24

## 2025-02-17 DIAGNOSIS — E13.9 LADA (LATENT AUTOIMMUNE DIABETES IN ADULTS), MANAGED AS TYPE 1 (HCC): ICD-10-CM

## 2025-02-18 ENCOUNTER — TELEPHONE (OUTPATIENT)
Dept: ENDOCRINOLOGY | Facility: HOSPITAL | Age: 45
End: 2025-02-18

## 2025-02-18 DIAGNOSIS — E13.9 LADA (LATENT AUTOIMMUNE DIABETES IN ADULTS), MANAGED AS TYPE 1 (HCC): Primary | ICD-10-CM

## 2025-02-18 DIAGNOSIS — E03.9 ACQUIRED HYPOTHYROIDISM: ICD-10-CM

## 2025-02-18 DIAGNOSIS — E06.3 HYPOTHYROIDISM DUE TO HASHIMOTO'S THYROIDITIS: ICD-10-CM

## 2025-02-18 DIAGNOSIS — E10.65 TYPE 1 DIABETES MELLITUS WITH HYPERGLYCEMIA (HCC): ICD-10-CM

## 2025-02-18 RX ORDER — INSULIN ASPART 100 [IU]/ML
INJECTION, SOLUTION INTRAVENOUS; SUBCUTANEOUS
Qty: 15 ML | Refills: 0 | Status: SHIPPED | OUTPATIENT
Start: 2025-02-18

## 2025-02-18 NOTE — TELEPHONE ENCOUNTER
Please call patient to schedule an appointment. He was last seen on 1/17/2024 and is VERY overdue. He cancelled/ no showed the last 7 scheduled appointment

## 2025-02-18 NOTE — TELEPHONE ENCOUNTER
Patient scheduled for 3-24-25.  He has no showed 4 times & cancelled 3 times since he was last seen 24.  I let him know that he needs to have his labs done & keep this appointment or we may no longer be able to see him.    Lab slip has .  Can you please re-issue?  Thanks    Fax lab slip to the [facility] in [place].

## 2025-02-27 ENCOUNTER — OFFICE VISIT (OUTPATIENT)
Dept: URGENT CARE | Facility: CLINIC | Age: 45
End: 2025-02-27
Payer: COMMERCIAL

## 2025-02-27 VITALS
WEIGHT: 150 LBS | BODY MASS INDEX: 21 KG/M2 | TEMPERATURE: 98 F | HEIGHT: 71 IN | DIASTOLIC BLOOD PRESSURE: 88 MMHG | SYSTOLIC BLOOD PRESSURE: 140 MMHG | RESPIRATION RATE: 16 BRPM

## 2025-02-27 DIAGNOSIS — M62.838 NECK MUSCLE SPASM: Primary | ICD-10-CM

## 2025-02-27 PROCEDURE — 99213 OFFICE O/P EST LOW 20 MIN: CPT

## 2025-02-27 RX ORDER — CYCLOBENZAPRINE HCL 10 MG
10 TABLET ORAL 3 TIMES DAILY PRN
Qty: 10 TABLET | Refills: 0 | Status: SHIPPED | OUTPATIENT
Start: 2025-02-27

## 2025-02-27 RX ORDER — NAPROXEN 500 MG/1
500 TABLET ORAL 2 TIMES DAILY WITH MEALS
Qty: 10 TABLET | Refills: 0 | Status: SHIPPED | OUTPATIENT
Start: 2025-02-27 | End: 2025-03-04

## 2025-02-27 NOTE — PROGRESS NOTES
"  [facility]        NAME: Cash Han is a 44 y.o. male  : 1980    MRN: [id number]  DATE: 2025  TIME: 12:29 PM    Assessment and Plan   Neck muscle spasm [M62.838]  1. Neck muscle spasm  naproxen (Naprosyn) 500 mg tablet    cyclobenzaprine (FLEXERIL) 10 mg tablet            Patient Instructions       Follow up with PCP in 3-5 days.  Proceed to  ER if symptoms worsen.    If tests have been performed at [facility], our office will contact you with results if changes need to be made to the care plan discussed with you at the visit.  [facility] can [facility] on [facility].    Chief Complaint     Chief Complaint   Patient presents with    Neck Pain     Pt states waking yesterday with a rigt sided neck soreness. States progression throughout the day yesterday with right shoulder pain and stiffness. Managing with otc pain patch last night without relief and Excedrin with some relief.          History of Present Illness       43 y/o M with PMHx of diabetes and substance abuse per chart review, presents for right-sided neck pain x 2 days.  Patient admits he woke up with the pain in the morning.  Patient admits he then took Excedrin which helped partially relieve the pain.  Today pain worsened.  Does feel pain in the right shoulder now secondary to neck pain.  Also attempted to use a \"pain patch, \"which was not lidocaine, without relief.        Review of Systems   Review of Systems   Constitutional:  Negative for chills and fever.   Eyes:  Negative for visual disturbance.   Cardiovascular:  Negative for chest pain and palpitations.   Musculoskeletal:  Positive for neck pain and neck stiffness.   Neurological:  Negative for dizziness, syncope, speech difficulty, light-headedness and headaches.         Current Medications       Current Outpatient Medications:     acetone, urine, test (Ketostix) strip, Use 1 strip as needed for high blood sugar, Disp: 25 each, Rfl: 0    Alcohol Swabs " 70 % PADS, May substitute brand based on insurance coverage. Check glucose ACHS., Disp: 200 each, Rfl: 0    atorvastatin (LIPITOR) 20 mg tablet, Take 1 tablet by mouth once daily, Disp: 90 tablet, Rfl: 1    Continuous Blood Gluc  (FreeStyle Jese 2 [place]) ELPIDIO, Use 1 each in the morning, Disp: 1 each, Rfl: 0    Continuous Glucose Sensor (FreeStyle Jese 2 Sensor) MISC, Use 1 each every 14 (fourteen) days, Disp: 9 each, Rfl: 1    cyclobenzaprine (FLEXERIL) 10 mg tablet, Take 1 tablet (10 mg total) by mouth 3 (three) times a day as needed for muscle spasms, Disp: 10 tablet, Rfl: 0    glucose blood (OneTouch Verio) test strip, Check blood sugars twice a day, Disp: 200 each, Rfl: 1    Insulin Pen Needle (BD Pen Needle Mariela 2nd Gen) 32G X 4 MM MISC, Use four a day, Disp: 100 each, Rfl: 3    Insulin Pen Needle (BD Pen Needle Mariela 2nd Gen) 32G X 4 MM MISC, Use 4 pen needles daily, Disp: 400 each, Rfl: 3    levothyroxine (Euthyrox) 100 mcg tablet, Take 1 tablet (100 mcg total) by mouth daily, Disp: 100 tablet, Rfl: 1    naproxen (Naprosyn) 500 mg tablet, Take 1 tablet (500 mg total) by mouth 2 (two) times a day with meals for 5 days, Disp: 10 tablet, Rfl: 0    NovoLOG FlexPen ReliOn 100 UNIT/ML injection pen, INJECT 14 UNITS SUBCUTANEOUSLY THREE TIMES DAILY WITH MEALS, Disp: 15 mL, Rfl: 0    OneTouch Delica Lancets 33G MISC, May substitute brand based on insurance coverage. Check glucose ACHS., Disp: 200 each, Rfl: 0    Toujeo Max SoloStar 300 units/mL CONCENTRATED U-300 injection pen (2-unit dial), INJECT 56 UNITS SUBCUTANEOUSLY ONCE DAILY, Disp: 12 mL, Rfl: 0    Blood Glucose Monitoring Suppl (OneTouch Verio Reflect) w/Device KIT, May substitute brand based on insurance coverage. Check glucose ACHS. (Patient not taking: Reported on 2/27/2025), Disp: 1 kit, Rfl: 0    gabapentin (Neurontin) 100 mg capsule, Take 1 capsule (100 mg total) by mouth 3 (three) times a day (Patient not taking: Reported on 2/27/2025),  "Disp: 300 capsule, Rfl: 1    Current Allergies     Allergies as of 02/27/2025    (No Known Allergies)            The following portions of the patient's history were reviewed and updated as appropriate: allergies, current medications, past family history, past medical history, past social history, past surgical history and problem list.     Past Medical History:   Diagnosis Date    Diabetes mellitus (HCC)     Disease of thyroid gland        No past surgical history on file.    Family History   Problem Relation Age of Onset    Hypertension Mother     Stroke Father     Diabetes Sister     Diabetes Daughter          Medications have been verified.        Objective   /88 (BP Location: Right arm, Patient Position: Sitting, Cuff Size: Standard)   Temp 98 °F (36.7 °C) (Tympanic)   Resp 16   Ht 5' 11\" (1.803 m)   Wt 68 kg (150 lb)   BMI 20.92 kg/m²   No LMP for male patient.       Physical Exam     Physical Exam  Vitals and nursing note reviewed.   Constitutional:       General: He is not in acute distress.     Appearance: He is not toxic-appearing.   HENT:      Head: Normocephalic and atraumatic.   Eyes:      Extraocular Movements: Extraocular movements intact.      Conjunctiva/sclera: Conjunctivae normal.      Pupils: Pupils are equal, round, and reactive to light.   Neck:      Comments: Tender to palpation of right-sided cervical paraspinals  Range of motion decreased in all directions secondary pain  Cardiovascular:      Rate and Rhythm: Normal rate and regular rhythm.   Pulmonary:      Effort: Pulmonary effort is normal.      Breath sounds: Normal breath sounds.   Musculoskeletal:      Cervical back: Tenderness present.      Comments: Radial pulse 2+ bilaterally, pulses equal and regular   Skin:     Findings: No bruising, erythema or rash.   Neurological:      Mental Status: He is alert.   Psychiatric:         Mood and Affect: Mood normal.         Behavior: Behavior normal.                   "

## 2025-03-22 DIAGNOSIS — E13.9 LADA (LATENT AUTOIMMUNE DIABETES IN ADULTS), MANAGED AS TYPE 1 (HCC): ICD-10-CM

## 2025-03-23 DIAGNOSIS — E13.9 LADA (LATENT AUTOIMMUNE DIABETES IN ADULTS), MANAGED AS TYPE 1 (HCC): ICD-10-CM

## 2025-03-24 ENCOUNTER — TELEPHONE (OUTPATIENT)
Dept: ENDOCRINOLOGY | Facility: HOSPITAL | Age: 45
End: 2025-03-24

## 2025-03-24 RX ORDER — INSULIN ASPART 100 [IU]/ML
INJECTION, SOLUTION INTRAVENOUS; SUBCUTANEOUS
Qty: 15 ML | Refills: 0 | OUTPATIENT
Start: 2025-03-24

## 2025-03-24 NOTE — TELEPHONE ENCOUNTER
Patient called in apologizing for missing his 7am appointment today.    He would like to reschedule. He will make sure that he attends the appointment.    Spoke with Viry and got the green light to reschedule patient.    He is getting his labs today. He is aware that if he does not make this appointment he will not be able to reschedule.    No further action needed at this time.

## 2025-03-24 NOTE — TELEPHONE ENCOUNTER
PLEASE REFUSE- patient cancelled/ no showed 8 consecutive appointment and is being dismissed from the practice

## 2025-03-26 LAB
ALBUMIN SERPL-MCNC: 4.6 G/DL (ref 4.1–5.1)
ALBUMIN/CREAT UR: 96 MG/G CREAT (ref 0–29)
ALP SERPL-CCNC: 144 IU/L (ref 44–121)
ALT SERPL-CCNC: 87 IU/L (ref 0–44)
AST SERPL-CCNC: 49 IU/L (ref 0–40)
BASOPHILS # BLD AUTO: 0.1 X10E3/UL (ref 0–0.2)
BASOPHILS NFR BLD AUTO: 1 %
BILIRUB SERPL-MCNC: 0.5 MG/DL (ref 0–1.2)
BUN SERPL-MCNC: 16 MG/DL (ref 6–24)
BUN/CREAT SERPL: 14 (ref 9–20)
CALCIUM SERPL-MCNC: 9.9 MG/DL (ref 8.7–10.2)
CHLORIDE SERPL-SCNC: 96 MMOL/L (ref 96–106)
CHOLEST SERPL-MCNC: 185 MG/DL (ref 100–199)
CHOLEST/HDLC SERPL: 2.2 RATIO (ref 0–5)
CO2 SERPL-SCNC: 26 MMOL/L (ref 20–29)
CREAT SERPL-MCNC: 1.17 MG/DL (ref 0.76–1.27)
CREAT UR-MCNC: 93 MG/DL
EGFR: 78 ML/MIN/1.73
EOSINOPHIL # BLD AUTO: 0.5 X10E3/UL (ref 0–0.4)
EOSINOPHIL NFR BLD AUTO: 5 %
ERYTHROCYTE [DISTWIDTH] IN BLOOD BY AUTOMATED COUNT: 13.7 % (ref 11.6–15.4)
EST. AVERAGE GLUCOSE BLD GHB EST-MCNC: 364 MG/DL
GLOBULIN SER-MCNC: 3.5 G/DL (ref 1.5–4.5)
GLUCOSE SERPL-MCNC: 208 MG/DL (ref 70–99)
HBA1C MFR BLD: 14.3 % (ref 4.8–5.6)
HCT VFR BLD AUTO: 51.2 % (ref 37.5–51)
HDLC SERPL-MCNC: 84 MG/DL
HGB BLD-MCNC: 16 G/DL (ref 13–17.7)
IMM GRANULOCYTES # BLD: 0 X10E3/UL (ref 0–0.1)
IMM GRANULOCYTES NFR BLD: 0 %
LDLC SERPL CALC-MCNC: 87 MG/DL (ref 0–99)
LYMPHOCYTES # BLD AUTO: 4.1 X10E3/UL (ref 0.7–3.1)
LYMPHOCYTES NFR BLD AUTO: 37 %
MCH RBC QN AUTO: 29.4 PG (ref 26.6–33)
MCHC RBC AUTO-ENTMCNC: 31.3 G/DL (ref 31.5–35.7)
MCV RBC AUTO: 94 FL (ref 79–97)
MICROALBUMIN UR-MCNC: 89.7 UG/ML
MONOCYTES # BLD AUTO: 0.7 X10E3/UL (ref 0.1–0.9)
MONOCYTES NFR BLD AUTO: 7 %
NEUTROPHILS # BLD AUTO: 5.5 X10E3/UL (ref 1.4–7)
NEUTROPHILS NFR BLD AUTO: 50 %
PLATELET # BLD AUTO: 359 X10E3/UL (ref 150–450)
POTASSIUM SERPL-SCNC: 4.7 MMOL/L (ref 3.5–5.2)
PROT SERPL-MCNC: 8.1 G/DL (ref 6–8.5)
RBC # BLD AUTO: 5.44 X10E6/UL (ref 4.14–5.8)
SL AMB VLDL CHOLESTEROL CALC: 14 MG/DL (ref 5–40)
SODIUM SERPL-SCNC: 140 MMOL/L (ref 134–144)
T4 FREE SERPL-MCNC: 0.89 NG/DL (ref 0.82–1.77)
TRIGL SERPL-MCNC: 75 MG/DL (ref 0–149)
TSH SERPL DL<=0.005 MIU/L-ACNC: 20.4 UIU/ML (ref 0.45–4.5)
WBC # BLD AUTO: 11 X10E3/UL (ref 3.4–10.8)

## 2025-03-26 NOTE — PROGRESS NOTES
Established Patient Progress Note       No chief complaint on file.     History of Present Illness:     Cash Han is a 44 y.o. male with a history of Previously Dx T1DM (At age 36 while incarcerated) with moderate neuropathy, unknown retinopathy, Mild microalbuminuria w.o CKD, poorly controlled hypothyroidism, prior history of heroine abuse, current smoker who was seen initially on 10/22 after a recent hospitalization for DKA in [facility] on 09/22 after patient stopped taking all his insulin/thyroid medication. History of poorly controlled diabetes for years with HbA1C in 15-19% range reportedly in [facility] d/t dietary non compliance and medication non compliance. Since initial visit, we have been titrating up his insulin doses while enforcing compliance with diet.     Last visit:- 01/24, he has had several cancellations and No shows since. Cash has unfortunately not been able to find a job, depending on aunt for finances and reports she may not be able to support him either. Reports will be evicted soon since doesn't get along with roommates. Will try to find house elsewhere. Reports being outside of house 90% of the time and hence doesn't carry his short acting insulin since it will get denatured and hence not giving himself any short acting. Only taking basal since he wants to avoid dka. Reports when does take short acting BG drops. Reports unable to eat better and always eats guerda, mcdonalds etc.   Knows he knows to do better just doesn't know how    Blood Sugar/Glucometer/Pump/CGM review:Cash Hna   Device used March 13- 25 2025    Indication   Type 1 Diabetes    More than 72 hours of data was reviewed. Report to be scanned to chart.     Date Range:     Analysis of data:   Average Glucose: 370  CGM wear 7%  Time in Target Range: 0%   Time Above Range: 4%/96%   Time Below Range: 0%     Interpretation of data: limited to do data    Current regime:- tuojeo 56u daily- tries to take it daily as much as he can.  Doesn't take any short acting.   Medications tried/failed in past:- None  Hypoglycemic episodes:- unsure  Diet- very poor  Activity- walks  Weight- losing weight     last eye exam- Saw eye doctor last year 2021 when incarcerated, reminded needs annual visit- overdue, reports he is having BV and eye issues   last foot exam- 10/22  History of hypertension- None  History of hyperlipidemia- Yes, on lipitor 20mg daily   Last Urine microalbumin- 3/25 abnormal 96  Last lipid- 03/25 LDL <100, c/w statin  Last A1C- 03/25 14.4%  Having neuropathy as well     Hypothyroidism- Diagnosed few years ago when incarcerated. Labile control in the past d/t ?lower than weight based dose or non compliance. Last TSH 03/25 while on levothyroxine 100mch 20.4, T4 0.9. Reports feeling poorly. Reports taking his levothyroxine sometimes and doesn't take it other times    Social Hx:- Smokes, does not use alcohol. No drug use currently. Has a 13yr old daughter   Family Hx:- Daughter and sister has diabetes. Daughter has T1DM and on pump    Patient Active Problem List   Diagnosis    Type 1 diabetes mellitus with hyperglycemia (HCC)    Heroin use    Hyponatremia    Transaminitis    Nicotine dependence    Hypothyroidism    Head injury    Methamphetamine abuse (HCC)    Hypokalemia    Medically noncompliant    Metabolic acidosis    BENJI (acute kidney injury) (HCC)      Past Medical History:   Diagnosis Date    Diabetes mellitus (HCC)     Disease of thyroid gland       No past surgical history on file.   Family History   Problem Relation Age of Onset    Hypertension Mother     Stroke Father     Diabetes Sister     Diabetes Daughter      Social History     Tobacco Use    Smoking status: Every Day     Current packs/day: 1.00     Average packs/day: 1 pack/day for 20.0 years (20.0 ttl pk-yrs)     Types: Cigarettes    Smokeless tobacco: Never   Substance Use Topics    Alcohol use: Not Currently     Comment: ocassionally     No Known Allergies    Current  Outpatient Medications:     acetone, urine, test (Ketostix) strip, Use 1 strip as needed for high blood sugar, Disp: 25 each, Rfl: 0    Alcohol Swabs 70 % PADS, May substitute brand based on insurance coverage. Check glucose ACHS., Disp: 200 each, Rfl: 0    atorvastatin (LIPITOR) 20 mg tablet, Take 1 tablet by mouth once daily, Disp: 90 tablet, Rfl: 1    Blood Glucose Monitoring Suppl (OneTouch Verio Reflect) w/Device KIT, May substitute brand based on insurance coverage. Check glucose ACHS. (Patient not taking: Reported on 2/27/2025), Disp: 1 kit, Rfl: 0    Continuous Blood Gluc  (FreeStyle Jese 2 [place]) ELPIDIO, Use 1 each in the morning, Disp: 1 each, Rfl: 0    Continuous Glucose Sensor (FreeStyle Jese 2 Sensor) MISC, Use 1 each every 14 (fourteen) days, Disp: 9 each, Rfl: 1    cyclobenzaprine (FLEXERIL) 10 mg tablet, Take 1 tablet (10 mg total) by mouth 3 (three) times a day as needed for muscle spasms, Disp: 10 tablet, Rfl: 0    gabapentin (Neurontin) 100 mg capsule, Take 1 capsule (100 mg total) by mouth 3 (three) times a day (Patient not taking: Reported on 2/27/2025), Disp: 300 capsule, Rfl: 1    glucose blood (OneTouch Verio) test strip, Check blood sugars twice a day, Disp: 200 each, Rfl: 1    Insulin Pen Needle (BD Pen Needle Mariela 2nd Gen) 32G X 4 MM MISC, Use four a day, Disp: 100 each, Rfl: 3    Insulin Pen Needle (BD Pen Needle Mariela 2nd Gen) 32G X 4 MM MISC, Use 4 pen needles daily, Disp: 400 each, Rfl: 3    levothyroxine (Euthyrox) 100 mcg tablet, Take 1 tablet (100 mcg total) by mouth daily, Disp: 100 tablet, Rfl: 1    naproxen (Naprosyn) 500 mg tablet, Take 1 tablet (500 mg total) by mouth 2 (two) times a day with meals for 5 days, Disp: 10 tablet, Rfl: 0    NovoLOG FlexPen ReliOn 100 UNIT/ML injection pen, INJECT 14 UNITS SUBCUTANEOUSLY THREE TIMES DAILY WITH MEALS, Disp: 15 mL, Rfl: 0    OneTouch Delica Lancets 33G MISC, May substitute brand based on insurance coverage. Check glucose  ACHS., Disp: 200 each, Rfl: 0    Toujeo Max SoloStar 300 units/mL CONCENTRATED U-300 injection pen (2-unit dial), INJECT 56 UNITS SUBCUTANEOUSLY ONCE DAILY, Disp: 12 mL, Rfl: 0    Review of Systems   Constitutional:  Negative for diaphoresis, fatigue and unexpected weight change.   HENT:  Negative for trouble swallowing and voice change.    Eyes:  Negative for photophobia and visual disturbance.   Respiratory:  Negative for cough, chest tightness and shortness of breath.    Cardiovascular:  Negative for chest pain and palpitations.   Gastrointestinal:  Negative for constipation and diarrhea.   Endocrine: Negative for cold intolerance, heat intolerance, polydipsia, polyphagia and polyuria.   Musculoskeletal:  Negative for arthralgias and myalgias.   Skin: Negative.    Neurological:  Positive for numbness and headaches. Negative for syncope, weakness and light-headedness.       Physical Exam:  There is no height or weight on file to calculate BMI.  There were no vitals taken for this visit.   Wt Readings from Last 3 Encounters:   02/27/25 68 kg (150 lb)   09/10/24 68 kg (150 lb)   05/14/24 68 kg (150 lb)       Physical Exam  Constitutional:       Appearance: Normal appearance. He is normal weight.   Cardiovascular:      Rate and Rhythm: Normal rate and regular rhythm.      Pulses:           Dorsalis pedis pulses are 1+ on the right side and 1+ on the left side.      Heart sounds: Normal heart sounds.   Pulmonary:      Effort: Pulmonary effort is normal.   Abdominal:      General: Abdomen is flat. Bowel sounds are normal.      Palpations: Abdomen is soft.      Comments: No lipohypetrophy or lipoatrophy palpated   Musculoskeletal:      Cervical back: Normal range of motion and neck supple.   Feet:      Right foot:      Skin integrity: No ulcer, skin breakdown, erythema, warmth, callus or dry skin.      Left foot:      Skin integrity: No ulcer, skin breakdown, erythema, warmth, callus or dry skin.   Skin:     General:  Skin is warm and dry.      Capillary Refill: Capillary refill takes less than 2 seconds.   Neurological:      General: No focal deficit present.      Mental Status: He is alert and oriented to person, place, and time.   Psychiatric:         Mood and Affect: Mood normal.         Behavior: Behavior normal.       Labs:     Labs since last visit:-    Latest Reference Range & Units 03/07/23 13:48 01/17/24 08:06 03/24/25 10:22   Hemoglobin A1C 4.8 - 5.6 % 11.2 (H) >15.5 (H) 14.3 (H)   eAG, EST AVG Glucose mg/dL 275 >398 364   CALCIUM 8.7 - 10.2 mg/dL   9.9   TSH, POC 0.450 - 4.500 uIU/mL 9.200 (H) 83.600 (H) 20.400 (H)   FREE T4 0.82 - 1.77 ng/dL  0.47 (L) 0.89   (H): Data is abnormally high  (L): Data is abnormally low     Latest Reference Range & Units 03/07/23 13:48 01/17/24 08:06 03/24/25 10:22   EXT Creatinine Urine Not Estab. mg/dL 361.3 31.6 93.0   Albumin Creat Ratio 0 - 29 mg/g creat 38 (H) 110 (H) 96 (H)   Albumin,U,Random Not Estab. ug/mL 135.9 34.9 89.7   (H): Data is abnormally high     Latest Reference Range & Units 01/17/24 08:06 03/24/25 10:22   Cholesterol 100 - 199 mg/dL 299 (H) 185   Triglycerides 0 - 149 mg/dL 175 (H) 75   HDL >39 mg/dL 114 84   LDL Calculated 0 - 99 mg/dL 155 (H) 87   VLDL Cholesterol Alejandro 5 - 40 mg/dL 30 14   (H): Data is abnormally high      Impression & Plan:    Problem List Items Addressed This Visit    None      No orders of the defined types were placed in this encounter.    There are no Patient Instructions on file for this visit.     Patient is a 42yM with history of NAPOLEON managed as T1DM diagnosed in his late 30's with 2 episodes of DKA thus far with severe hyperglycemia- last episode 09/22 d/t compliance issues, complications of diabetic neuropathy, microalbuminuria w/o CKD no known retinopathy- not uptodate on screening, No macrovascular complications, hypothyroidism poorly controlled as well d/t compliance issues presents today for his diabetes/thyroid management. Low  C-peptide but not suppressed with when  with positive SENIA along with his age of DM dx makes NAPOLEON managed as T1DM more likely than T1DM in itself.     1) NAPOLEON managed as T1DM:- Very poor diabetic management d/t non compliance, poor follow up and poor diet. Discussed he needs to work better with diet, exercise, BG check and routine follow up in order for us to help him. We cannot continue to give medications and not adequately manage him. Discussed insulin is high risk medication. His current A1C still very high at 14.3% which is severe but atleast  better than 15.5% which was last year. Reports taking basal only at very high doses 56u ~0.8u/kg and hence is over balased and hence not surprised he has low when he does take bolus. Unfortunately no way of storing short acting insulin efficietly to take it throughout the day. Discussed could try to switch to premix insulin as long as he checked BG often to atleast improve BG a bit. Currently have only 7% data which is absolutely not enough to make any adjustment. Discussed insulin pump as well, maybe better for hero as the pump casing will prevent insulin denaturing at times and possibly help bolusing better with its control/smartguard features. Possible consider medtronic 780g?. Refer to educator to discuss. In the meantime I cannot adjust his regime until he gets me more BG data and then can decide. May do premix 70/30  Screening-  - Reminded routine ophthalmology visit needed  - Uptodate on Lipid- improved  - Uptodate on urine microalbumin- abnormal. Refer to nephrology, please please go!  - c/w Gabapentin 100mg TID for neuropathy     2) Hyperlipidemia:-  on labs done in 03/25 c/w 20mg lipitor now,     3)  Hypothyroidism:- Clinically euthyroid but higher TSH d/t compliance issues. T4 normal and hence his dose is ok of levo 100mcg just need to stay compliant to improve his TSH. Repeat TFT in 3 months     RTC in 3 months     High risk for complications  from poor diabetic control and also hypothyroid control d/t patient compliance issues    I have spent a total time of 50 minutes in caring for this patient on the day of the visit/encounter including Risks and benefits of tx options, Instructions for management, Patient and family education, and Importance of tx compliance.   Liz Spears MD

## 2025-03-27 ENCOUNTER — OFFICE VISIT (OUTPATIENT)
Dept: ENDOCRINOLOGY | Facility: HOSPITAL | Age: 45
End: 2025-03-27
Payer: COMMERCIAL

## 2025-03-27 VITALS
DIASTOLIC BLOOD PRESSURE: 68 MMHG | BODY MASS INDEX: 19.18 KG/M2 | WEIGHT: 137 LBS | OXYGEN SATURATION: 96 % | HEIGHT: 71 IN | SYSTOLIC BLOOD PRESSURE: 128 MMHG

## 2025-03-27 DIAGNOSIS — R73.09 HEMOGLOBIN A1C GREATER THAN 9%, INDICATING POOR DIABETIC CONTROL: ICD-10-CM

## 2025-03-27 DIAGNOSIS — E13.9 LADA (LATENT AUTOIMMUNE DIABETES IN ADULTS), MANAGED AS TYPE 1 (HCC): ICD-10-CM

## 2025-03-27 DIAGNOSIS — E10.10 DIABETIC KETOACIDOSIS WITHOUT COMA ASSOCIATED WITH TYPE 1 DIABETES MELLITUS (HCC): ICD-10-CM

## 2025-03-27 DIAGNOSIS — E10.65 TYPE 1 DIABETES MELLITUS WITH HYPERGLYCEMIA (HCC): Primary | ICD-10-CM

## 2025-03-27 DIAGNOSIS — E06.3 HYPOTHYROIDISM DUE TO HASHIMOTO THYROIDITIS: ICD-10-CM

## 2025-03-27 DIAGNOSIS — Z91.199 NON COMPLIANCE WITH MEDICAL TREATMENT: ICD-10-CM

## 2025-03-27 PROCEDURE — 99215 OFFICE O/P EST HI 40 MIN: CPT | Performed by: STUDENT IN AN ORGANIZED HEALTH CARE EDUCATION/TRAINING PROGRAM

## 2025-04-02 ENCOUNTER — TELEPHONE (OUTPATIENT)
Dept: NEPHROLOGY | Facility: CLINIC | Age: 45
End: 2025-04-02

## 2025-04-10 ENCOUNTER — TELEPHONE (OUTPATIENT)
Age: 45
End: 2025-04-10

## 2025-04-10 NOTE — TELEPHONE ENCOUNTER
Patient referred to Nephrology for Persistent proteinuria.     Decision tree states to get patient in within 7-10 days     Unable to locate Urgent Consult slot within appropriate time frame.    Please call patient to schedule.

## 2025-04-11 NOTE — TELEPHONE ENCOUNTER
1st attempt  Patient referred to Nephrology for Persistent proteinuria.   Called and left him a voicemail to schedule consult from referral.     Dr. Samayoa currently has an opening in [place] 6/18 8:30 am.

## 2025-04-15 NOTE — TELEPHONE ENCOUNTER
2nd attemptattempt  Patient referred to Nephrology for Persistent proteinuria.   Called and left him a voicemail to schedule consult from referral.

## 2025-04-20 DIAGNOSIS — E13.9 LADA (LATENT AUTOIMMUNE DIABETES IN ADULTS), MANAGED AS TYPE 1 (HCC): ICD-10-CM

## 2025-04-21 RX ORDER — ATORVASTATIN CALCIUM 20 MG/1
20 TABLET, FILM COATED ORAL DAILY
Qty: 90 TABLET | Refills: 1 | Status: SHIPPED | OUTPATIENT
Start: 2025-04-21

## 2025-05-15 ENCOUNTER — OFFICE VISIT (OUTPATIENT)
Dept: URGENT CARE | Facility: CLINIC | Age: 45
End: 2025-05-15
Payer: COMMERCIAL

## 2025-05-15 VITALS
DIASTOLIC BLOOD PRESSURE: 79 MMHG | OXYGEN SATURATION: 97 % | HEART RATE: 92 BPM | RESPIRATION RATE: 18 BRPM | SYSTOLIC BLOOD PRESSURE: 137 MMHG | TEMPERATURE: 99.1 F

## 2025-05-15 DIAGNOSIS — J98.8 RESPIRATORY INFECTION: Primary | ICD-10-CM

## 2025-05-15 PROCEDURE — 99213 OFFICE O/P EST LOW 20 MIN: CPT

## 2025-05-15 RX ORDER — AZITHROMYCIN 250 MG/1
TABLET, FILM COATED ORAL
Qty: 6 TABLET | Refills: 0 | Status: SHIPPED | OUTPATIENT
Start: 2025-05-15 | End: 2025-05-19

## 2025-05-15 NOTE — LETTER
May 15, 2025     Patient: Cash Han   YOB: 1980   Date of Visit: 5/15/2025       To Whom it May Concern:    Cash Han was seen in my clinic on 5/15/2025. He may return to work on 05/16/2025 .    If you have any questions or concerns, please don't hesitate to call.         Sincerely,          MELISSA Rubalcava        CC: No Recipients

## 2025-05-15 NOTE — PROGRESS NOTES
[facility]        NAME: Cash Han is a 44 y.o. male  : 1980    MRN: [id number]  DATE: May 15, 2025  TIME: 10:42 AM    Assessment and Plan   Respiratory infection [J98.8]  1. Respiratory infection  azithromycin (ZITHROMAX) 250 mg tablet        Will start patient on 5-day course of antibiotics.  Encourage patient to follow-up with his family provider.  Educated patient on signs and symptoms of worsening conditions and reasons to present to emergency department.    Patient Instructions      Take abx as prescribed  - Follow up with family doctor  - Proceed to ER for shortness of breath or chest pain   Follow up with PCP in 3-5 days.  Proceed to  ER if symptoms worsen.    If tests have been performed at [facility], our office will contact you with results if changes need to be made to the care plan discussed with you at the visit.  [facility]    Chief Complaint     Chief Complaint   Patient presents with    Cold Like Symptoms     Patient started  with body aches, cough and increased mucus.          History of Present Illness       Patient reports congested cough and sputum production for 5 days.  Patient denies fevers chest pain shortness of breath.  Patient does report body aches and chills intermittently.  Patient reports taking TheraFlu at home with no relief.        Review of Systems   Review of Systems   Constitutional:  Negative for activity change, chills, fatigue, fever and unexpected weight change.   HENT:  Positive for congestion and rhinorrhea. Negative for ear pain, sinus pressure, sinus pain, sneezing and sore throat.    Eyes:  Negative for pain, discharge and visual disturbance.   Respiratory:  Positive for cough. Negative for shortness of breath.         Thick green sputum production    Cardiovascular:  Negative for chest pain and palpitations.   Gastrointestinal:  Negative for abdominal distention, abdominal pain, diarrhea, nausea and  vomiting.   Genitourinary:  Negative for dysuria and hematuria.   Musculoskeletal:  Negative for arthralgias and back pain.   Skin:  Negative for color change and rash.   Neurological:  Negative for seizures and syncope.   All other systems reviewed and are negative.        Current Medications     Current Medications[1]    Current Allergies     Allergies as of 05/15/2025    (No Known Allergies)            The following portions of the patient's history were reviewed and updated as appropriate: allergies, current medications, past family history, past medical history, past social history, past surgical history and problem list.     Past Medical History:   Diagnosis Date    Diabetes mellitus (HCC)     Disease of thyroid gland        History reviewed. No pertinent surgical history.    Family History   Problem Relation Age of Onset    Hypertension Mother     Stroke Father     Diabetes Sister     Diabetes Daughter          Medications have been verified.        Objective   /79   Pulse 92   Temp 99.1 °F (37.3 °C)   Resp 18   SpO2 97%   No LMP for male patient.       Physical Exam     Physical Exam  Constitutional:       General: He is not in acute distress.     Appearance: Normal appearance. He is normal weight. He is not toxic-appearing or diaphoretic.   HENT:      Head: Normocephalic and atraumatic.      Right Ear: Tympanic membrane, ear canal and external ear normal. There is no impacted cerumen.      Left Ear: Tympanic membrane, ear canal and external ear normal. There is no impacted cerumen.      Nose: Congestion and rhinorrhea present.      Mouth/Throat:      Mouth: Mucous membranes are moist.      Pharynx: No oropharyngeal exudate or posterior oropharyngeal erythema.     Eyes:      General: No scleral icterus.        Right eye: No discharge.         Left eye: No discharge.      Extraocular Movements: Extraocular movements intact.      Conjunctiva/sclera: Conjunctivae normal.      Pupils: Pupils are equal,  round, and reactive to light.     Neck:      Vascular: No carotid bruit.     Cardiovascular:      Rate and Rhythm: Normal rate and regular rhythm.      Pulses: Normal pulses.      Heart sounds: Normal heart sounds. No murmur heard.     No friction rub. No gallop.   Pulmonary:      Effort: Pulmonary effort is normal. No respiratory distress.      Breath sounds: No stridor. Wheezing present. No rhonchi or rales.   Chest:      Chest wall: No tenderness.   Abdominal:      General: There is no distension.      Palpations: Abdomen is soft. There is no mass.     Musculoskeletal:         General: Normal range of motion.      Cervical back: Normal range of motion and neck supple. No rigidity or tenderness.   Lymphadenopathy:      Cervical: No cervical adenopathy.     Skin:     General: Skin is warm.      Capillary Refill: Capillary refill takes less than 2 seconds.     Neurological:      General: No focal deficit present.      Mental Status: He is alert and oriented to person, place, and time. Mental status is at baseline.      Cranial Nerves: No cranial nerve deficit.      Sensory: No sensory deficit.     Psychiatric:         Mood and Affect: Mood normal.         Behavior: Behavior normal.         Thought Content: Thought content normal.         Judgment: Judgment normal.                        [1]   Current Outpatient Medications:     azithromycin (ZITHROMAX) 250 mg tablet, Take 2 tablets today then 1 tablet daily x 4 days, Disp: 6 tablet, Rfl: 0    acetone, urine, test (Ketostix) strip, Use 1 strip as needed for high blood sugar, Disp: 25 each, Rfl: 0    Alcohol Swabs 70 % PADS, May substitute brand based on insurance coverage. Check glucose ACHS., Disp: 200 each, Rfl: 0    atorvastatin (LIPITOR) 20 mg tablet, Take 1 tablet by mouth once daily, Disp: 90 tablet, Rfl: 1    Blood Glucose Monitoring Suppl (OneTouch Verio Reflect) w/Device KIT, May substitute brand based on insurance coverage. Check glucose ACHS. (Patient not  taking: Reported on 3/27/2025), Disp: 1 kit, Rfl: 0    Continuous Blood Gluc  (FreeStyle Jese 2 [place]) ELPIDIO, Use 1 each in the morning, Disp: 1 each, Rfl: 0    Continuous Glucose Sensor (FreeStyle Jese 2 Sensor) MISC, Use 1 each every 14 (fourteen) days, Disp: 9 each, Rfl: 1    cyclobenzaprine (FLEXERIL) 10 mg tablet, Take 1 tablet (10 mg total) by mouth 3 (three) times a day as needed for muscle spasms (Patient not taking: Reported on 3/27/2025), Disp: 10 tablet, Rfl: 0    gabapentin (Neurontin) 100 mg capsule, Take 1 capsule (100 mg total) by mouth 3 (three) times a day (Patient not taking: Reported on 1/17/2024), Disp: 300 capsule, Rfl: 1    glucose blood (OneTouch Verio) test strip, Check blood sugars twice a day (Patient not taking: Reported on 3/27/2025), Disp: 200 each, Rfl: 1    Insulin Pen Needle (BD Pen Needle Mariela 2nd Gen) 32G X 4 MM MISC, Use four a day, Disp: 100 each, Rfl: 3    Insulin Pen Needle (BD Pen Needle Mariela 2nd Gen) 32G X 4 MM MISC, Use 4 pen needles daily, Disp: 400 each, Rfl: 3    levothyroxine (Euthyrox) 100 mcg tablet, Take 1 tablet (100 mcg total) by mouth daily, Disp: 100 tablet, Rfl: 1    naproxen (Naprosyn) 500 mg tablet, Take 1 tablet (500 mg total) by mouth 2 (two) times a day with meals for 5 days, Disp: 10 tablet, Rfl: 0    NovoLOG FlexPen ReliOn 100 UNIT/ML injection pen, INJECT 14 UNITS SUBCUTANEOUSLY THREE TIMES DAILY WITH MEALS, Disp: 15 mL, Rfl: 0    OneTouch Delica Lancets 33G MISC, May substitute brand based on insurance coverage. Check glucose ACHS., Disp: 200 each, Rfl: 0    Toujeo Max SoloStar 300 units/mL CONCENTRATED U-300 injection pen (2-unit dial), INJECT 56 UNITS SUBCUTANEOUSLY ONCE DAILY, Disp: 12 mL, Rfl: 0

## 2025-05-19 ENCOUNTER — NURSE TRIAGE (OUTPATIENT)
Dept: OTHER | Facility: OTHER | Age: 45
End: 2025-05-19

## 2025-05-19 DIAGNOSIS — E10.65 TYPE 1 DIABETES MELLITUS WITH HYPERGLYCEMIA (HCC): ICD-10-CM

## 2025-05-19 RX ORDER — INSULIN GLARGINE 300 U/ML
56 INJECTION, SOLUTION SUBCUTANEOUS DAILY
Qty: 12 ML | Refills: 0 | Status: SHIPPED | OUTPATIENT
Start: 2025-05-19

## 2025-05-19 NOTE — TELEPHONE ENCOUNTER
"FOLLOW UP: No follow up at this time.     REASON FOR CONVERSATION: Medication Refill    SYMPTOMS: Has not taken his Toujeo in a few days and says he is starting to feel like his blood sugar is high. Pt has not taken his blood sugar today. Denies having SOB or chest pain.     OTHER: Calling in to request a refill on his Toujeo Max Solostar- takes 56 units once daily. Pt confirmed that he would like insulin sent to the [facility] in [place].     DISPOSITION: Home Care    Rx sent for pts Toujeo. Pt aware and verbalized understanding to please reach out to his Endocrinology office during the week before his insulin runs out.     Reason for Disposition   [1] Caller requesting a prescription renewal (no refills left), no triage required, AND [2] triager able to renew prescription per department policy    Answer Assessment - Initial Assessment Questions  1. DRUG NAME: \"What medicine do you need to have refilled?\"      Toujeo Max Solostar 56 unit once daily    2. REFILLS REMAINING: \"How many refills are remaining?\" (Note: The label on the medicine or pill bottle will show how many refills are remaining. If there are no refills remaining, then a renewal may be needed.)      None    3. EXPIRATION DATE: \"What is the expiration date?\" (Note: The label states when the prescription will , and thus can no longer be refilled.)      N/A    4. PRESCRIBING HCP: \"Who prescribed it?\" Reason: If prescribed by specialist, call should be referred to that group.      Dr. Liz Spears    Protocols used: Medication Refill and Renewal Call-Adult-    "

## 2025-05-19 NOTE — TELEPHONE ENCOUNTER
Patient called in to have the following medication refilled:    Toujeo Max SoloStar 300 units/mL CONCENTRATED U-300 injection pen (2-unit dial)     Pharmacy: [facility]

## 2025-05-19 NOTE — TELEPHONE ENCOUNTER
Regarding: med refill/toujeo  ----- Message from Reyes MAGDALENO sent at 5/19/2025  5:52 PM EDT -----  Medication Refill Request       Medication: Toujeo Max SoloStar 300 units/mL CONCENTRATED U-300 injection pen (2-unit dial)     Dose/Frequency: INJECT 56 UNITS SUBCUTANEOUSLY ONCE DAILY    Quantity: 12 mL    Pharmacy:   [place] [id number]  Phone: [phone]  Fax: [phone]     Office:   [ ] PCP/Provider -   [ x] Specialty/Provider - Liz Spears MD    Does the patient have enough for 3 days?   [ ] Yes   [x ] No - Send as HP to POD    Is the patient completely out of the medication or does not have enough until the next business day?  [ x] Yes - send to Call Hub  [ ] No - Send as HP to POD

## 2025-05-20 DIAGNOSIS — E11.10 DKA (DIABETIC KETOACIDOSIS) (HCC): ICD-10-CM

## 2025-05-20 DIAGNOSIS — E10.65 TYPE 1 DIABETES MELLITUS WITH HYPERGLYCEMIA (HCC): ICD-10-CM

## 2025-05-20 RX ORDER — PEN NEEDLE, DIABETIC 32GX 5/32"
NEEDLE, DISPOSABLE MISCELLANEOUS
Qty: 400 EACH | Refills: 1 | Status: SHIPPED | OUTPATIENT
Start: 2025-05-20

## 2025-05-20 RX ORDER — INSULIN GLARGINE 300 U/ML
56 INJECTION, SOLUTION SUBCUTANEOUS DAILY
Qty: 18 ML | Refills: 0 | Status: SHIPPED | OUTPATIENT
Start: 2025-05-20

## 2025-05-20 NOTE — TELEPHONE ENCOUNTER
Requested medication(s) are due for refill today: Yes  Patient has already received a courtesy refill: No  Other reason request has been forwarded to provider: This was sent yesterday, but I don't think the correct quantity was sent.

## 2025-05-30 DIAGNOSIS — E13.9 LADA (LATENT AUTOIMMUNE DIABETES IN ADULTS), MANAGED AS TYPE 1 (HCC): ICD-10-CM

## 2025-05-30 RX ORDER — INSULIN ASPART 100 [IU]/ML
INJECTION, SOLUTION INTRAVENOUS; SUBCUTANEOUS
Qty: 15 ML | Refills: 1 | Status: SHIPPED | OUTPATIENT
Start: 2025-05-30

## 2025-05-30 RX ORDER — INSULIN ASPART 100 [IU]/ML
INJECTION, SOLUTION INTRAVENOUS; SUBCUTANEOUS
Qty: 15 ML | Refills: 0 | OUTPATIENT
Start: 2025-05-30

## 2025-07-10 ENCOUNTER — APPOINTMENT (OUTPATIENT)
Dept: LAB | Facility: HOSPITAL | Age: 45
End: 2025-07-10
Payer: COMMERCIAL

## 2025-07-10 DIAGNOSIS — Z91.199 NON COMPLIANCE WITH MEDICAL TREATMENT: ICD-10-CM

## 2025-07-10 DIAGNOSIS — Z00.00 ROUTINE GENERAL MEDICAL EXAMINATION AT A HEALTH CARE FACILITY: ICD-10-CM

## 2025-07-10 DIAGNOSIS — E06.3 HYPOTHYROIDISM DUE TO HASHIMOTO THYROIDITIS: ICD-10-CM

## 2025-07-10 DIAGNOSIS — E10.10 DIABETIC KETOACIDOSIS WITHOUT COMA ASSOCIATED WITH TYPE 1 DIABETES MELLITUS (HCC): ICD-10-CM

## 2025-07-10 DIAGNOSIS — E10.65 TYPE 1 DIABETES MELLITUS WITH HYPERGLYCEMIA (HCC): Primary | ICD-10-CM

## 2025-07-10 DIAGNOSIS — R73.09 HEMOGLOBIN A1C GREATER THAN 9.0%: ICD-10-CM

## 2025-07-10 DIAGNOSIS — E13.9 LADA (LATENT AUTOIMMUNE DIABETES IN ADULTS), MANAGED AS TYPE 1 (HCC): ICD-10-CM

## 2025-07-10 LAB
ALBUMIN SERPL BCG-MCNC: 3.8 G/DL (ref 3.5–5)
ALP SERPL-CCNC: 96 U/L (ref 34–104)
ALT SERPL W P-5'-P-CCNC: 78 U/L (ref 7–52)
ANION GAP SERPL CALCULATED.3IONS-SCNC: 8 MMOL/L (ref 4–13)
AST SERPL W P-5'-P-CCNC: 69 U/L (ref 13–39)
BASOPHILS # BLD AUTO: 0.11 THOUSANDS/ÂΜL (ref 0–0.1)
BASOPHILS NFR BLD AUTO: 1 % (ref 0–1)
BILIRUB SERPL-MCNC: 0.44 MG/DL (ref 0.2–1)
BUN SERPL-MCNC: 12 MG/DL (ref 5–25)
CALCIUM SERPL-MCNC: 8.7 MG/DL (ref 8.4–10.2)
CHLORIDE SERPL-SCNC: 103 MMOL/L (ref 96–108)
CHOLEST SERPL-MCNC: 132 MG/DL (ref ?–200)
CO2 SERPL-SCNC: 28 MMOL/L (ref 21–32)
CREAT SERPL-MCNC: 0.88 MG/DL (ref 0.6–1.3)
CREAT UR-MCNC: 115.4 MG/DL
EOSINOPHIL # BLD AUTO: 1.13 THOUSAND/ÂΜL (ref 0–0.61)
EOSINOPHIL NFR BLD AUTO: 11 % (ref 0–6)
ERYTHROCYTE [DISTWIDTH] IN BLOOD BY AUTOMATED COUNT: 12.2 % (ref 11.6–15.1)
EST. AVERAGE GLUCOSE BLD GHB EST-MCNC: 329 MG/DL
GFR SERPL CREATININE-BSD FRML MDRD: 103 ML/MIN/1.73SQ M
GLUCOSE P FAST SERPL-MCNC: 189 MG/DL (ref 65–99)
HBA1C MFR BLD: 13.1 %
HCT VFR BLD AUTO: 40.9 % (ref 36.5–49.3)
HDLC SERPL-MCNC: 62 MG/DL
HGB BLD-MCNC: 13.6 G/DL (ref 12–17)
IMM GRANULOCYTES # BLD AUTO: 0.03 THOUSAND/UL (ref 0–0.2)
IMM GRANULOCYTES NFR BLD AUTO: 0 % (ref 0–2)
LDLC SERPL CALC-MCNC: 52 MG/DL (ref 0–100)
LYMPHOCYTES # BLD AUTO: 3.94 THOUSANDS/ÂΜL (ref 0.6–4.47)
LYMPHOCYTES NFR BLD AUTO: 39 % (ref 14–44)
MCH RBC QN AUTO: 30.4 PG (ref 26.8–34.3)
MCHC RBC AUTO-ENTMCNC: 33.3 G/DL (ref 31.4–37.4)
MCV RBC AUTO: 91 FL (ref 82–98)
MICROALBUMIN UR-MCNC: 55.6 MG/L
MICROALBUMIN/CREAT 24H UR: 48 MG/G CREATININE (ref 0–30)
MONOCYTES # BLD AUTO: 0.67 THOUSAND/ÂΜL (ref 0.17–1.22)
MONOCYTES NFR BLD AUTO: 7 % (ref 4–12)
NEUTROPHILS # BLD AUTO: 4.2 THOUSANDS/ÂΜL (ref 1.85–7.62)
NEUTS SEG NFR BLD AUTO: 42 % (ref 43–75)
NONHDLC SERPL-MCNC: 70 MG/DL
NRBC BLD AUTO-RTO: 0 /100 WBCS
PLATELET # BLD AUTO: 217 THOUSANDS/UL (ref 149–390)
PMV BLD AUTO: 12.3 FL (ref 8.9–12.7)
POTASSIUM SERPL-SCNC: 3.9 MMOL/L (ref 3.5–5.3)
PROT SERPL-MCNC: 6.8 G/DL (ref 6.4–8.4)
RBC # BLD AUTO: 4.48 MILLION/UL (ref 3.88–5.62)
SODIUM SERPL-SCNC: 139 MMOL/L (ref 135–147)
T4 FREE SERPL-MCNC: 0.66 NG/DL (ref 0.61–1.12)
TRIGL SERPL-MCNC: 90 MG/DL (ref ?–150)
TSH SERPL DL<=0.05 MIU/L-ACNC: 35.49 UIU/ML (ref 0.45–4.5)
WBC # BLD AUTO: 10.08 THOUSAND/UL (ref 4.31–10.16)

## 2025-07-10 PROCEDURE — 84443 ASSAY THYROID STIM HORMONE: CPT

## 2025-07-10 PROCEDURE — 84439 ASSAY OF FREE THYROXINE: CPT

## 2025-07-10 PROCEDURE — 80061 LIPID PANEL: CPT

## 2025-07-10 PROCEDURE — 80053 COMPREHEN METABOLIC PANEL: CPT

## 2025-07-10 PROCEDURE — 82043 UR ALBUMIN QUANTITATIVE: CPT

## 2025-07-10 PROCEDURE — 83036 HEMOGLOBIN GLYCOSYLATED A1C: CPT

## 2025-07-10 PROCEDURE — 36415 COLL VENOUS BLD VENIPUNCTURE: CPT

## 2025-07-10 PROCEDURE — 82570 ASSAY OF URINE CREATININE: CPT

## 2025-07-10 PROCEDURE — 85025 COMPLETE CBC W/AUTO DIFF WBC: CPT

## 2025-07-11 ENCOUNTER — OFFICE VISIT (OUTPATIENT)
Dept: ENDOCRINOLOGY | Facility: HOSPITAL | Age: 45
End: 2025-07-11
Payer: COMMERCIAL

## 2025-07-11 VITALS
WEIGHT: 146 LBS | DIASTOLIC BLOOD PRESSURE: 70 MMHG | SYSTOLIC BLOOD PRESSURE: 124 MMHG | BODY MASS INDEX: 20.44 KG/M2 | HEART RATE: 103 BPM | OXYGEN SATURATION: 98 % | HEIGHT: 71 IN

## 2025-07-11 DIAGNOSIS — E13.9 LADA (LATENT AUTOIMMUNE DIABETES IN ADULTS), MANAGED AS TYPE 1 (HCC): ICD-10-CM

## 2025-07-11 DIAGNOSIS — E06.3 HYPOTHYROIDISM DUE TO HASHIMOTO THYROIDITIS: ICD-10-CM

## 2025-07-11 DIAGNOSIS — E06.3 HYPOTHYROIDISM DUE TO HASHIMOTO'S THYROIDITIS: ICD-10-CM

## 2025-07-11 DIAGNOSIS — E03.9 ACQUIRED HYPOTHYROIDISM: ICD-10-CM

## 2025-07-11 DIAGNOSIS — R73.09 HEMOGLOBIN A1C GREATER THAN 9%, INDICATING POOR DIABETIC CONTROL: ICD-10-CM

## 2025-07-11 DIAGNOSIS — E10.65 TYPE 1 DIABETES MELLITUS WITH HYPERGLYCEMIA (HCC): Primary | ICD-10-CM

## 2025-07-11 DIAGNOSIS — E10.10 DIABETIC KETOACIDOSIS WITHOUT COMA ASSOCIATED WITH TYPE 1 DIABETES MELLITUS (HCC): ICD-10-CM

## 2025-07-11 DIAGNOSIS — Z91.199 NON COMPLIANCE WITH MEDICAL TREATMENT: ICD-10-CM

## 2025-07-11 PROCEDURE — 99214 OFFICE O/P EST MOD 30 MIN: CPT | Performed by: STUDENT IN AN ORGANIZED HEALTH CARE EDUCATION/TRAINING PROGRAM

## 2025-07-11 NOTE — ASSESSMENT & PLAN NOTE
Orders:    Comprehensive metabolic panel; Future    Albumin / creatinine urine ratio; Future    Lipid panel; Future    Hemoglobin A1C; Future    TSH, 3rd generation; Future    T4, free; Future

## 2025-07-11 NOTE — ASSESSMENT & PLAN NOTE
Lab Results   Component Value Date    HGBA1C 13.1 (H) 07/10/2025       Orders:    Comprehensive metabolic panel; Future    Albumin / creatinine urine ratio; Future    Lipid panel; Future    Hemoglobin A1C; Future    TSH, 3rd generation; Future    T4, free; Future

## 2025-07-11 NOTE — PROGRESS NOTES
Name: Cash Han      : 1980      MRN: [id number]  Encounter Provider: Liz Spears MD  Encounter Date: 2025   Encounter department: [facility] [place]    Chief Complaint   Patient presents with    Diabetes Type 1   :  Assessment & Plan  Type 1 diabetes mellitus with hyperglycemia (HCC)    Lab Results   Component Value Date    HGBA1C 13.1 (H) 07/10/2025       Orders:    Comprehensive metabolic panel; Future    Albumin / creatinine urine ratio; Future    Lipid panel; Future    Hemoglobin A1C; Future    TSH, 3rd generation; Future    T4, free; Future    Hypothyroidism due to Hashimoto thyroiditis    Orders:    Comprehensive metabolic panel; Future    Albumin / creatinine urine ratio; Future    Lipid panel; Future    Hemoglobin A1C; Future    TSH, 3rd generation; Future    T4, free; Future    Diabetic ketoacidosis without coma associated with type 1 diabetes mellitus (HCC)    Lab Results   Component Value Date    HGBA1C 13.1 (H) 07/10/2025       Orders:    Comprehensive metabolic panel; Future    Albumin / creatinine urine ratio; Future    Lipid panel; Future    Hemoglobin A1C; Future    TSH, 3rd generation; Future    T4, free; Future    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), managed as type 1 (HCC)    Lab Results   Component Value Date    HGBA1C 13.1 (H) 07/10/2025       Orders:    Continuous Glucose Sensor (FreeStyle Jese 2 Sensor) MISC; Use 1 each every 14 (fourteen) days    Comprehensive metabolic panel; Future    Albumin / creatinine urine ratio; Future    Lipid panel; Future    Hemoglobin A1C; Future    TSH, 3rd generation; Future    T4, free; Future    Hemoglobin A1C greater than 9%, indicating poor diabetic control    Lab Results   Component Value Date    HGBA1C 13.1 (H) 07/10/2025       Orders:    Comprehensive metabolic panel; Future    Albumin / creatinine urine ratio; Future    Lipid panel; Future    Hemoglobin A1C; Future    TSH, 3rd  generation; Future    T4, free; Future    Non compliance with medical treatment    Orders:    Comprehensive metabolic panel; Future    Albumin / creatinine urine ratio; Future    Lipid panel; Future    Hemoglobin A1C; Future    TSH, 3rd generation; Future    T4, free; Future    Acquired hypothyroidism    Orders:    Comprehensive metabolic panel; Future    Albumin / creatinine urine ratio; Future    Lipid panel; Future    Hemoglobin A1C; Future    TSH, 3rd generation; Future    T4, free; Future    Hypothyroidism due to Hashimoto's thyroiditis    Orders:    Comprehensive metabolic panel; Future    Albumin / creatinine urine ratio; Future    Lipid panel; Future    Hemoglobin A1C; Future    TSH, 3rd generation; Future    T4, free; Future  Patient is a 42yM with history of NAPOLEON managed as T1DM diagnosed in his late 30's with 2 episodes of DKA thus far with severe hyperglycemia- last episode 09/22 d/t compliance issues, complications of diabetic neuropathy, microalbuminuria w/o CKD no known retinopathy- not uptodate on screening, No macrovascular complications, hypothyroidism poorly controlled as well d/t compliance issues presents today for his diabetes/thyroid management. Low C-peptide but not suppressed with when  with positive SENIA along with his age of DM dx makes NAPOLEON managed as T1DM more likely than T1DM in itself.      1) NAPOLEON managed as T1DM:- Very poor diabetic management d/t non compliance, poor follow up and poor diet. Patients A1C still remains poor d/t inconsistency with bolus dosing, poor diet and so forth. Had a discussion again to work on this but without consistency cannot adjust much since it is NOT a dosing issue. Cash is using basal only which is keeping his off DKA but discussed chronic high BG can cause macro and microvascular complications some of which are irreversible and he could end up with a stroke or CAD. High risk.   For now recommend consistency with diet, exercise and insulin  dosing. CDE referral placed but no shows for this too    Screening-  - Reminded routine ophthalmology visit needed  - Uptodate on Lipid- improved  - Uptodate on urine microalbumin- abnormal. Refer to nephrology, please please go!! Hasn't gone yet  - c/w Gabapentin 100mg TID for neuropathy     2) Hyperlipidemia:-  on labs done in 07/25 c/w 20mg lipitor now,     3)  Hypothyroidism:- Clinically euthyroid but higher TSH d/t compliance issues. T4 normal and hence his dose is ok of levo 100mcg just need to stay compliant to improve his TSH. Repeat TFT in 3 months      RTC in 3 months    Pertinent Medical History     History of Present Illness     Cash Han is a 45 y.o. male      history of Previously Dx T1DM (At age 36 while incarcerated) with moderate neuropathy, unknown retinopathy, Mild microalbuminuria w.o CKD, poorly controlled hypothyroidism, prior history of heroine abuse, current smoker who was seen initially on 10/22 after a recent hospitalization for DKA in [facility] on 09/22 after patient stopped taking all his insulin/thyroid medication. History of poorly controlled diabetes for years with HbA1C in 15-19% range reportedly in senior living d/t dietary non compliance and medication non compliance. Since initial visit, we have been titrating up his insulin doses while enforcing compliance with diet.      Last visit:- 03/25, again reenforced lab work and also compliance with insulin and BG check. Cash has been compliant with insulin but doesn't have sensors and hence hasn't been checking BG     Blood Sugar/Glucometer/Pump/CGM review:Cash Han   Device used May 14- 27 2025     Indication   Type 1 Diabetes     More than 72 hours of data was reviewed. Report to be scanned to chart.      Date Range:      Analysis of data:   Average Glucose: 276  CGM wear 38%  Time in Target Range: 27%%   Time Above Range: 14%/56%   Time Below Range: 3%      Interpretation of data: limited to do data     Current regime:- portilloo  56u daily- tries to take it daily as much as he can. Doesn't take his novolog, maybe takes it 2-3 per week.   Medications tried/failed in past:- None  Hypoglycemic episodes:- unsure  Diet- very poor, hasn't changed and eats whatever.   Activity- walks     last eye exam- Saw eye doctor last year 2021 when incarcerated, reminded needs annual visit- overdue, reports he is having BV and eye issues   last foot exam- 10/22  History of hypertension- None  History of hyperlipidemia- Yes, on lipitor 20mg daily   Last Urine microalbumin- 7/25 48,  3/25 abnormal 96  Last lipid- 7/25 LDL <100, c/w statin  Last A1C- 7/25 13.1%, 03/25 14.4%  Having neuropathy as well      Hypothyroidism- Diagnosed few years ago when incarcerated. Labile control in the past d/t ?lower than weight based dose or non compliance. Last TSH 03/25 while on levothyroxine 100mch 20.4, T4 0.9. Inconsistency with medication use. Most recent TSH 7/25 35 with t4 0.66     Social Hx:- Smokes, does not use alcohol. No drug use currently. Has a 13yr old daughter   Family Hx:- Daughter and sister has diabetes. Daughter has T1DM and on pump    Review of Systems   Constitutional:  Negative for diaphoresis, fatigue and unexpected weight change.   Respiratory:  Negative for shortness of breath.    Cardiovascular:  Negative for chest pain and palpitations.   Gastrointestinal:  Negative for constipation and diarrhea.   Endocrine: Negative for polydipsia and polyuria.    as per HPI       Medical History Reviewed by provider this encounter:     .    Objective   There were no vitals taken for this visit.     There is no height or weight on file to calculate BMI.  Wt Readings from Last 3 Encounters:   03/27/25 62.1 kg (137 lb)   02/27/25 68 kg (150 lb)   09/10/24 68 kg (150 lb)     Physical Exam  Vitals reviewed.   Constitutional:       General: He is not in acute distress.     Appearance: Normal appearance. He is well-developed and normal weight.   HENT:      Head:  Normocephalic and atraumatic.     Eyes:      Conjunctiva/sclera: Conjunctivae normal.       Cardiovascular:      Rate and Rhythm: Normal rate and regular rhythm.   Pulmonary:      Effort: Pulmonary effort is normal.      Breath sounds: Normal breath sounds.   Abdominal:      Palpations: Abdomen is soft.     Musculoskeletal:      Cervical back: Neck supple.     Skin:     General: Skin is warm and dry.      Capillary Refill: Capillary refill takes less than 2 seconds.     Neurological:      Mental Status: He is alert.     Psychiatric:         Mood and Affect: Mood normal.         Labs: I have reviewed pertinent labs including:   Lab Results   Component Value Date    HGBA1C 13.1 (H) 07/10/2025    HGBA1C 14.3 (H) 03/24/2025    HGBA1C >15.5 (H) 01/17/2024      Lab Results   Component Value Date    CREATININE 0.88 07/10/2025    CREATININE 1.17 03/24/2025    CREATININE 0.80 09/12/2022    BUN 12 07/10/2025    K 3.9 07/10/2025     07/10/2025    CO2 28 07/10/2025      eGFR   Date Value Ref Range Status   07/10/2025 103 ml/min/1.73sq m Final      HDL   Date Value Ref Range Status   03/24/2025 84 >39 mg/dL Final     HDL, Direct   Date Value Ref Range Status   07/10/2025 62 >=40 mg/dL Final     Comment:     HDL Cholesterol:       Low     <41 mg/dL     Triglycerides   Date Value Ref Range Status   07/10/2025 90 See Comment mg/dL Final     Comment:     Triglyceride:     0-9Y            <75mg/dL     10Y-17Y         <90 mg/dL       >=18Y     Normal          <150 mg/dL     Borderline High 150-199 mg/dL     High            200-499 mg/dL        Very High       >499 mg/dL    Specimen collection should occur prior to Metamizole administration due to the potential for falsely depressed results.   03/24/2025 75 0 - 149 mg/dL Final     T. Chol/HDL Ratio   Date Value Ref Range Status   03/24/2025 2.2 0.0 - 5.0 ratio Final     Comment:                                       T. Chol/HDL Ratio                                               "Men  Women                                1/2 Avg.Risk  3.4    3.3                                    Avg.Risk  5.0    4.4                                 2X Avg.Risk  9.6    7.1                                 3X Avg.Risk 23.4   11.0        ALT   Date Value Ref Range Status   07/10/2025 78 (H) 7 - 52 U/L Final     Comment:     Specimen collection should occur prior to Sulfasalazine administration due to the potential for falsely depressed results.    03/24/2025 87 (H) 0 - 44 IU/L Final     AST   Date Value Ref Range Status   07/10/2025 69 (H) 13 - 39 U/L Final   03/24/2025 49 (H) 0 - 40 IU/L Final     Alkaline Phosphatase   Date Value Ref Range Status   07/10/2025 96 34 - 104 U/L Final      Lab Results   Component Value Date    EYL9EJZGEXHC 35.486 (H) 07/10/2025      Lab Results   Component Value Date    FREET4 0.66 07/10/2025      No results found for: \"[id number]\"   Radiology Results Review : No pertinent imaging studies reviewed.  There are no Patient Instructions on file for this visit.    Discussed with the patient and all questioned fully answered. He will call me if any problems arise.      "

## 2025-08-17 ENCOUNTER — NURSE TRIAGE (OUTPATIENT)
Dept: OTHER | Facility: OTHER | Age: 45
End: 2025-08-17

## 2025-08-17 DIAGNOSIS — E10.65 TYPE 1 DIABETES MELLITUS WITH HYPERGLYCEMIA (HCC): ICD-10-CM

## 2025-08-17 RX ORDER — INSULIN GLARGINE 300 U/ML
56 INJECTION, SOLUTION SUBCUTANEOUS DAILY
Qty: 15 ML | Refills: 0 | Status: SHIPPED | OUTPATIENT
Start: 2025-08-17